# Patient Record
Sex: FEMALE | Race: WHITE | NOT HISPANIC OR LATINO | Employment: UNEMPLOYED | URBAN - NONMETROPOLITAN AREA
[De-identification: names, ages, dates, MRNs, and addresses within clinical notes are randomized per-mention and may not be internally consistent; named-entity substitution may affect disease eponyms.]

---

## 2019-10-17 ENCOUNTER — OFFICE VISIT (OUTPATIENT)
Dept: GASTROENTEROLOGY | Facility: CLINIC | Age: 29
End: 2019-10-17

## 2019-10-17 VITALS
HEIGHT: 64 IN | DIASTOLIC BLOOD PRESSURE: 82 MMHG | HEART RATE: 96 BPM | WEIGHT: 164 LBS | SYSTOLIC BLOOD PRESSURE: 132 MMHG | OXYGEN SATURATION: 99 % | BODY MASS INDEX: 28 KG/M2

## 2019-10-17 DIAGNOSIS — R79.89 ELEVATED LIVER FUNCTION TESTS: ICD-10-CM

## 2019-10-17 DIAGNOSIS — Z71.6 TOBACCO ABUSE COUNSELING: ICD-10-CM

## 2019-10-17 DIAGNOSIS — B18.2 CHRONIC HEPATITIS C WITHOUT HEPATIC COMA (HCC): Primary | ICD-10-CM

## 2019-10-17 DIAGNOSIS — E66.9 OBESITY, UNSPECIFIED OBESITY SEVERITY, UNSPECIFIED OBESITY TYPE: ICD-10-CM

## 2019-10-17 PROCEDURE — 99204 OFFICE O/P NEW MOD 45 MIN: CPT | Performed by: CLINICAL NURSE SPECIALIST

## 2019-10-17 RX ORDER — OMEPRAZOLE 20 MG/1
CAPSULE, DELAYED RELEASE ORAL DAILY
COMMUNITY
Start: 2019-10-15

## 2019-10-17 RX ORDER — GABAPENTIN 300 MG/1
CAPSULE ORAL 3 TIMES DAILY
COMMUNITY
Start: 2019-10-15

## 2019-10-17 RX ORDER — VENLAFAXINE HYDROCHLORIDE 75 MG/1
CAPSULE, EXTENDED RELEASE ORAL DAILY
COMMUNITY
Start: 2019-10-14

## 2019-10-17 NOTE — PROGRESS NOTES
Jaqui Kate  1990    10/17/2019  Chief Complaint   Patient presents with   • GI Problem     New patient ref by Vanesa Guevara NP for Hepatitis C     Subjective   HPI  Jaqui Kate is a 29 y.o. female who presents with a complaint of hepatitis C diagnosed 2 1/2 years ago. She says that since that time she was incarcerated and hadnt been treated. She went to a clinic in [place], [place] and told them she wanted to seek treatment for this.   She has a hx of IV drug use last time 3 years ago which is likely source. Chronicity is unknown. 2019 AST 40, ALT 82 T bili 0.4 ALKP 91. Platelet count 346 H/H 12.7/37.2.   HIV negative  Genotype 1A    Immune to hepatitis A and B   No ETOH  No current IV drug use  No sexual partner at current time  She has never been treated for hepatitis C    Past Medical History:   Diagnosis Date   • Anxiety    • Depressed    • GERD (gastroesophageal reflux disease)    • Hepatitis C      Past Surgical History:   Procedure Laterality Date   •  SECTION      x 2   • TONSILLECTOMY         Outpatient Medications Marked as Taking for the 10/17/19 encounter (Office Visit) with Pallavi Cee APRN   Medication Sig Dispense Refill   • gabapentin (NEURONTIN) 300 MG capsule 3 (Three) Times a Day.     • omeprazole (priLOSEC) 20 MG capsule Daily.     • venlafaxine XR (EFFEXOR-XR) 75 MG 24 hr capsule Daily.       No Known Allergies  Social History     Socioeconomic History   • Marital status: Single     Spouse name: Not on file   • Number of children: Not on file   • Years of education: Not on file   • Highest education level: Not on file   Tobacco Use   • Smoking status: Current Every Day Smoker     Packs/day: 0.25     Types: Cigarettes   • Smokeless tobacco: Never Used   Substance and Sexual Activity   • Alcohol use: No     Frequency: Never     Family History   Problem Relation Age of Onset   • Colon cancer Neg Hx    • Colon polyps Neg Hx      Health Maintenance   Topic Date Due   •  "ANNUAL PHYSICAL  09/12/1992   • TDAP/TD VACCINES (1 - Tdap) 09/12/2008   • INFLUENZA VACCINE  08/01/2019   • PAP SMEAR  10/15/2019     Review of Systems   Constitutional: Negative for activity change, appetite change, chills, diaphoresis, fatigue, fever and unexpected weight change.   HENT: Negative for ear pain, hearing loss, mouth sores, sore throat, trouble swallowing and voice change.    Eyes: Negative.    Respiratory: Negative for cough, choking, shortness of breath and wheezing.    Cardiovascular: Negative for chest pain and palpitations.   Gastrointestinal: Negative for abdominal pain, blood in stool, constipation, diarrhea, nausea and vomiting.   Endocrine: Negative for cold intolerance and heat intolerance.   Genitourinary: Negative for decreased urine volume, dysuria, frequency, hematuria and urgency.   Musculoskeletal: Negative for back pain, gait problem and myalgias.   Skin: Negative for color change, pallor and rash.   Allergic/Immunologic: Negative for food allergies and immunocompromised state.   Neurological: Negative for dizziness, tremors, seizures, syncope, weakness, light-headedness, numbness and headaches.   Hematological: Negative for adenopathy. Does not bruise/bleed easily.   Psychiatric/Behavioral: Negative for agitation and confusion. The patient is not nervous/anxious.    All other systems reviewed and are negative.    Objective   Vitals:    10/17/19 1008   BP: 132/82   Pulse: 96   SpO2: 99%   Weight: 74.4 kg (164 lb)   Height: 162.6 cm (64\")     Body mass index is 28.15 kg/m².  Physical Exam   Constitutional: She is oriented to person, place, and time. She appears well-developed and well-nourished.   HENT:   Head: Normocephalic and atraumatic.   Eyes: Pupils are equal, round, and reactive to light.   Neck: Normal range of motion. Neck supple. No tracheal deviation present.   Cardiovascular: Normal rate, regular rhythm and normal heart sounds. Exam reveals no gallop and no friction rub. "   No murmur heard.  Pulmonary/Chest: Effort normal and breath sounds normal. No respiratory distress. She has no wheezes. She has no rales. She exhibits no tenderness.   Abdominal: Soft. Bowel sounds are normal. She exhibits no distension. There is no hepatosplenomegaly. There is no tenderness. There is no rigidity, no rebound and no guarding.   Musculoskeletal: Normal range of motion. She exhibits no edema, tenderness or deformity.   Neurological: She is alert and oriented to person, place, and time. She has normal reflexes.   Skin: Skin is warm and dry. No rash noted. No pallor.   Psychiatric: She has a normal mood and affect. Her behavior is normal. Judgment and thought content normal.     Assessment/Plan   Jaqui was seen today for gi problem.    Diagnoses and all orders for this visit:    Chronic hepatitis C without hepatic coma (CMS/HCC)  -     US Liver; Future  -     Mitochondrial Antibodies, M2; Future  -     Ferritin  -     Anti-Smooth Muscle Antibody Titer  -     Alpha - 1 - Antitrypsin Phenotype  -     Ceruloplasmin  -     Hepatitis C RNA, Quantitative, PCR (graph)  -     Drug Screen (10), Serum  -     HCV NS5A Drug Resistance Assay  -     US Elastography Paranchyma; Future  -     Anti-microsomal Antibody  -     RICARDO  -     Ethanol  -     Hepatitis Panel, Acute    Elevated liver function tests    Tobacco abuse counseling    Obesity, unspecified obesity severity, unspecified obesity type    Other orders  -     Cancel: Hepatitis C Antibody  -     Cancel: Hepatitis B DNA, Quantitative, PCR    We have discussed the importance of follow up and keep appointments  Will determine treatment based on what her labs reveal and pending approval from her insurance carrier. She is aware that they determine this and I will decide what drug is best for the treatment of her type of hepatitis C. When/if approved she is to complete course as prescribed not missing a dose.   Encouraged no further IV drug use  She currently  has no sexual partner I have advised to safe sexual practices.   She verbalizes understanding and agrees.     EMR Dragon/transcription disclaimer: Much of this encounter note is electronic transcription/translation of spoken language to printed text. The electronic translation of spoken language may be erroneous, or at times, nonsensical words or phrases may be inadvertently transcribed. Although I have reviewed the note for such errors, some may still exist.  Body mass index is 28.15 kg/m².  Return in about 4 weeks (around 11/14/2019).    Patient's Body mass index is 28.15 kg/m². BMI is above normal parameters. Recommendations include: nutrition counseling.      All risks, benefits, alternatives, and indications of colonoscopy and/or Endoscopy procedure have been discussed with the patient. Risks to include perforation of the colon requiring possible surgery or colostomy, risk of bleeding from biopsies or removal of colon tissue, possibility of missing a colon polyp or cancer, or adverse drug reaction.  Benefits to include the diagnosis and management of disease of the colon and rectum. Alternatives to include barium enema, radiographic evaluation, lab testing or no intervention. Pt verbalizes understanding and agrees.     Pallavi Cee, APRN  10/17/2019  3:44 PM      Obesity, Adult  Obesity is the condition of having too much total body fat. Being overweight or obese means that your weight is greater than what is considered healthy for your body size. Obesity is determined by a measurement called BMI. BMI is an estimate of body fat and is calculated from height and weight. For adults, a BMI of 30 or higher is considered obese.  Obesity can eventually lead to other health concerns and major illnesses, including:  · Stroke.  · Coronary artery disease (CAD).  · Type 2 diabetes.  · Some types of cancer, including cancers of the colon, breast, uterus, and gallbladder.  · Osteoarthritis.  · High blood pressure  (hypertension).  · High cholesterol.  · Sleep apnea.  · Gallbladder stones.  · Infertility problems.  What are the causes?  The main cause of obesity is taking in (consuming) more calories than your body uses for energy. Other factors that contribute to this condition may include:  · Being born with genes that make you more likely to become obese.  · Having a medical condition that causes obesity. These conditions include:  ¨ Hypothyroidism.  ¨ Polycystic ovarian syndrome (PCOS).  ¨ Binge-eating disorder.  ¨ Cushing syndrome.  · Taking certain medicines, such as steroids, antidepressants, and seizure medicines.  · Not being physically active (sedentary lifestyle).  · Living where there are limited places to exercise safely or buy healthy foods.  · Not getting enough sleep.  What increases the risk?  The following factors may increase your risk of this condition:  · Having a family history of obesity.  · Being a woman of -American descent.  · Being a man of  descent.  What are the signs or symptoms?  Having excessive body fat is the main symptom of this condition.  How is this diagnosed?  This condition may be diagnosed based on:  · Your symptoms.  · Your medical history.  · A physical exam. Your health care provider may measure:  ¨ Your BMI. If you are an adult with a BMI between 25 and less than 30, you are considered overweight. If you are an adult with a BMI of 30 or higher, you are considered obese.  ¨ The distances around your hips and your waist (circumferences). These may be compared to each other to help diagnose your condition.  ¨ Your skinfold thickness. Your health care provider may gently pinch a fold of your skin and measure it.  How is this treated?  Treatment for this condition often includes changing your lifestyle. Treatment may include some or all of the following:  · Dietary changes. Work with your health care provider and a dietitian to set a weight-loss goal that is healthy and  reasonable for you. Dietary changes may include eating:  ¨ Smaller portions. A portion size is the amount of a particular food that is healthy for you to eat at one time. This varies from person to person.  ¨ Low-calorie or low-fat options.  ¨ More whole grains, fruits, and vegetables.  · Regular physical activity. This may include aerobic activity (cardio) and strength training.  · Medicine to help you lose weight. Your health care provider may prescribe medicine if you are unable to lose 1 pound a week after 6 weeks of eating more healthily and doing more physical activity.  · Surgery. Surgical options may include gastric banding and gastric bypass. Surgery may be done if:  ¨ Other treatments have not helped to improve your condition.  ¨ You have a BMI of 40 or higher.  ¨ You have life-threatening health problems related to obesity.  Follow these instructions at home:     Eating and drinking     · Follow recommendations from your health care provider about what you eat and drink. Your health care provider may advise you to:  ¨ Limit fast foods, sweets, and processed snack foods.  ¨ Choose low-fat options, such as low-fat milk instead of whole milk.  ¨ Eat 5 or more servings of fruits or vegetables every day.  ¨ Eat at home more often. This gives you more control over what you eat.  ¨ Choose healthy foods when you eat out.  ¨ Learn what a healthy portion size is.  ¨ Keep low-fat snacks on hand.  ¨ Avoid sugary drinks, such as soda, fruit juice, iced tea sweetened with sugar, and flavored milk.  ¨ Eat a healthy breakfast.  · Drink enough water to keep your urine clear or pale yellow.  · Do not go without eating for long periods of time (do not fast) or follow a fad diet. Fasting and fad diets can be unhealthy and even dangerous.  Physical Activity   · Exercise regularly, as told by your health care provider. Ask your health care provider what types of exercise are safe for you and how often you should  exercise.  · Warm up and stretch before being active.  · Cool down and stretch after being active.  · Rest between periods of activity.  Lifestyle   · Limit the time that you spend in front of your TV, computer, or video game system.  · Find ways to reward yourself that do not involve food.  · Limit alcohol intake to no more than 1 drink a day for nonpregnant women and 2 drinks a day for men. One drink equals 12 oz of beer, 5 oz of wine, or 1½ oz of hard liquor.  General instructions   · Keep a weight loss journal to keep track of the food you eat and how much you exercise you get.  · Take over-the-counter and prescription medicines only as told by your health care provider.  · Take vitamins and supplements only as told by your health care provider.  · Consider joining a support group. Your health care provider may be able to recommend a support group.  · Keep all follow-up visits as told by your health care provider. This is important.  Contact a health care provider if:  · You are unable to meet your weight loss goal after 6 weeks of dietary and lifestyle changes.  This information is not intended to replace advice given to you by your health care provider. Make sure you discuss any questions you have with your health care provider.  Document Released: 01/25/2006 Document Revised: 05/22/2017 Document Reviewed: 10/05/2016  [employer] Interactive Patient Education © 2017 [employer]      If you smoke or use tobacco, 4 minutes reading provided  Steps to Quit Smoking  Smoking tobacco can be harmful to your health and can affect almost every organ in your body. Smoking puts you, and those around you, at risk for developing many serious chronic diseases. Quitting smoking is difficult, but it is one of the best things that you can do for your health. It is never too late to quit.  What are the benefits of quitting smoking?  When you quit smoking, you lower your risk of developing serious diseases and conditions, such  as:  · Lung cancer or lung disease, such as COPD.  · Heart disease.  · Stroke.  · Heart attack.  · Infertility.  · Osteoporosis and bone fractures.  Additionally, symptoms such as coughing, wheezing, and shortness of breath may get better when you quit. You may also find that you get sick less often because your body is stronger at fighting off colds and infections. If you are pregnant, quitting smoking can help to reduce your chances of having a baby of low birth weight.  How do I get ready to quit?  When you decide to quit smoking, create a plan to make sure that you are successful. Before you quit:  · Pick a date to quit. Set a date within the next two weeks to give you time to prepare.  · Write down the reasons why you are quitting. Keep this list in places where you will see it often, such as on your bathroom mirror or in your car or wallet.  · Identify the people, places, things, and activities that make you want to smoke (triggers) and avoid them. Make sure to take these actions:  ¨ Throw away all cigarettes at home, at work, and in your car.  ¨ Throw away smoking accessories, such as ashtrays and lighters.  ¨ Clean your car and make sure to empty the ashtray.  ¨ Clean your home, including curtains and carpets.  · Tell your family, friends, and coworkers that you are quitting. Support from your loved ones can make quitting easier.  · Talk with your health care provider about your options for quitting smoking.  · Find out what treatment options are covered by your health insurance.  What strategies can I use to quit smoking?  Talk with your healthcare provider about different strategies to quit smoking. Some strategies include:  · Quitting smoking altogether instead of gradually lessening how much you smoke over a period of time. Research shows that quitting “cold turkey” is more successful than gradually quitting.  · Attending in-person counseling to help you build problem-solving skills. You are more likely  to have success in quitting if you attend several counseling sessions. Even short sessions of 10 minutes can be effective.  · Finding resources and support systems that can help you to quit smoking and remain smoke-free after you quit. These resources are most helpful when you use them often. They can include:  ¨ Online chats with a counselor.  ¨ Telephone quitlines.  ¨ Printed self-help materials.  ¨ Support groups or group counseling.  ¨ Text messaging programs.  ¨ Mobile phone applications.  · Taking medicines to help you quit smoking. (If you are pregnant or breastfeeding, talk with your health care provider first.) Some medicines contain nicotine and some do not. Both types of medicines help with cravings, but the medicines that include nicotine help to relieve withdrawal symptoms. Your health care provider may recommend:  ¨ Nicotine patches, gum, or lozenges.  ¨ Nicotine inhalers or sprays.  ¨ Non-nicotine medicine that is taken by mouth.  Talk with your health care provider about combining strategies, such as taking medicines while you are also receiving in-person counseling. Using these two strategies together makes you more likely to succeed in quitting than if you used either strategy on its own.  If you are pregnant or breastfeeding, talk with your health care provider about finding counseling or other support strategies to quit smoking. Do not take medicine to help you quit smoking unless told to do so by your health care provider.  What things can I do to make it easier to quit?  Quitting smoking might feel overwhelming at first, but there is a lot that you can do to make it easier. Take these important actions:  · Reach out to your family and friends and ask that they support and encourage you during this time. Call telephone quitlines, reach out to support groups, or work with a counselor for support.  · Ask people who smoke to avoid smoking around you.  · Avoid places that trigger you to smoke, such  as bars, parties, or smoke-break areas at work.  · Spend time around people who do not smoke.  · Lessen stress in your life, because stress can be a smoking trigger for some people. To lessen stress, try:  ¨ Exercising regularly.  ¨ Deep-breathing exercises.  ¨ Yoga.  ¨ Meditating.  ¨ Performing a body scan. This involves closing your eyes, scanning your body from head to toe, and noticing which parts of your body are particularly tense. Purposefully relax the muscles in those areas.  · Download or purchase mobile phone or tablet apps (applications) that can help you stick to your quit plan by providing reminders, tips, and encouragement. There are many free apps, such as QuitGuide from the CDC (Centers for Disease Control and Prevention). You can find other support for quitting smoking (smoking cessation) through smokefree.gov and other websites.  How will I feel when I quit smoking?  Within the first 24 hours of quitting smoking, you may start to feel some withdrawal symptoms. These symptoms are usually most noticeable 2-3 days after quitting, but they usually do not last beyond 2-3 weeks. Changes or symptoms that you might experience include:  · Mood swings.  · Restlessness, anxiety, or irritation.  · Difficulty concentrating.  · Dizziness.  · Strong cravings for sugary foods in addition to nicotine.  · Mild weight gain.  · Constipation.  · Nausea.  · Coughing or a sore throat.  · Changes in how your medicines work in your body.  · A depressed mood.  · Difficulty sleeping (insomnia).  After the first 2-3 weeks of quitting, you may start to notice more positive results, such as:  · Improved sense of smell and taste.  · Decreased coughing and sore throat.  · Slower heart rate.  · Lower blood pressure.  · Clearer skin.  · The ability to breathe more easily.  · Fewer sick days.  Quitting smoking is very challenging for most people. Do not get discouraged if you are not successful the first time. Some people need to  make many attempts to quit before they achieve long-term success. Do your best to stick to your quit plan, and talk with your health care provider if you have any questions or concerns.  This information is not intended to replace advice given to you by your health care provider. Make sure you discuss any questions you have with your health care provider.  Document Released: 12/12/2002 Document Revised: 08/15/2017 Document Reviewed: 05/03/2016  [employer] Interactive Patient Education © 2017 [employer]

## 2019-10-28 ENCOUNTER — HOSPITAL ENCOUNTER (OUTPATIENT)
Dept: ULTRASOUND IMAGING | Facility: HOSPITAL | Age: 29
Discharge: HOME | End: 2019-10-28
Admitting: CLINICAL NURSE SPECIALIST

## 2019-10-28 ENCOUNTER — HOSPITAL ENCOUNTER (OUTPATIENT)
Dept: ULTRASOUND IMAGING | Facility: HOSPITAL | Age: 29
Discharge: HOME | End: 2019-10-28

## 2019-10-28 DIAGNOSIS — B18.2 CHRONIC HEPATITIS C WITHOUT HEPATIC COMA (HCC): ICD-10-CM

## 2019-10-28 PROCEDURE — 76705 ECHO EXAM OF ABDOMEN: CPT

## 2019-10-28 PROCEDURE — 76981 USE PARENCHYMA: CPT

## 2019-11-22 ENCOUNTER — TELEPHONE (OUTPATIENT)
Dept: GASTROENTEROLOGY | Facility: CLINIC | Age: 29
End: 2019-11-22

## 2019-11-22 NOTE — TELEPHONE ENCOUNTER
I have tried to call patient multiple times and have left multiple messages about her labs that she hasn't gotten drawn that Pallavi ordered back in October for Hepatitis C treatment and I have also mailed her a letter patient hasn't responded we will no longer treat patient for Hepatitis C per her non compliance.

## 2024-01-30 ENCOUNTER — HOSPITAL ENCOUNTER (INPATIENT)
Facility: HOSPITAL | Age: 34
LOS: 5 days | Discharge: LEFT BEFORE DISCHARGE | DRG: 871 | End: 2024-02-04
Attending: FAMILY MEDICINE | Admitting: FAMILY MEDICINE
Payer: COMMERCIAL

## 2024-01-30 PROBLEM — A41.9 SEPSIS: Status: ACTIVE | Noted: 2024-01-30

## 2024-01-30 LAB
ARTERIAL PATENCY WRIST A: POSITIVE
ATMOSPHERIC PRESS: 753 MMHG
BASE EXCESS BLDA CALC-SCNC: -4.1 MMOL/L (ref 0–2)
BDY SITE: ABNORMAL
BODY TEMPERATURE: 37
HCO3 BLDA-SCNC: 17.4 MMOL/L (ref 20–26)
Lab: ABNORMAL
MODALITY: ABNORMAL
PCO2 BLDA: 21 MM HG (ref 35–45)
PCO2 TEMP ADJ BLD: 21 MM HG (ref 35–45)
PH BLDA: 7.53 PH UNITS (ref 7.35–7.45)
PH, TEMP CORRECTED: 7.53 PH UNITS (ref 7.35–7.45)
PO2 BLDA: 82.5 MM HG (ref 83–108)
PO2 TEMP ADJ BLD: 82.5 MM HG (ref 83–108)
SAO2 % BLDCOA: 97.3 % (ref 94–99)
VENTILATOR MODE: ABNORMAL

## 2024-01-30 PROCEDURE — 87147 CULTURE TYPE IMMUNOLOGIC: CPT | Performed by: INTERNAL MEDICINE

## 2024-01-30 PROCEDURE — 85007 BL SMEAR W/DIFF WBC COUNT: CPT | Performed by: INTERNAL MEDICINE

## 2024-01-30 PROCEDURE — 87186 SC STD MICRODIL/AGAR DIL: CPT | Performed by: INTERNAL MEDICINE

## 2024-01-30 PROCEDURE — 87154 CUL TYP ID BLD PTHGN 6+ TRGT: CPT | Performed by: INTERNAL MEDICINE

## 2024-01-30 PROCEDURE — 83605 ASSAY OF LACTIC ACID: CPT | Performed by: INTERNAL MEDICINE

## 2024-01-30 PROCEDURE — 83735 ASSAY OF MAGNESIUM: CPT | Performed by: INTERNAL MEDICINE

## 2024-01-30 PROCEDURE — 87040 BLOOD CULTURE FOR BACTERIA: CPT | Performed by: INTERNAL MEDICINE

## 2024-01-30 PROCEDURE — 82803 BLOOD GASES ANY COMBINATION: CPT

## 2024-01-30 PROCEDURE — 85025 COMPLETE CBC W/AUTO DIFF WBC: CPT | Performed by: INTERNAL MEDICINE

## 2024-01-30 PROCEDURE — 82550 ASSAY OF CK (CPK): CPT | Performed by: INTERNAL MEDICINE

## 2024-01-30 PROCEDURE — 36600 WITHDRAWAL OF ARTERIAL BLOOD: CPT

## 2024-01-30 PROCEDURE — 80053 COMPREHEN METABOLIC PANEL: CPT | Performed by: INTERNAL MEDICINE

## 2024-01-30 PROCEDURE — 85362 FIBRIN DEGRADATION PRODUCTS: CPT | Performed by: INTERNAL MEDICINE

## 2024-01-30 RX ORDER — PANTOPRAZOLE SODIUM 40 MG/1
40 TABLET, DELAYED RELEASE ORAL
Status: DISCONTINUED | OUTPATIENT
Start: 2024-01-31 | End: 2024-01-31

## 2024-01-30 RX ORDER — CHLORHEXIDINE GLUCONATE 500 MG/1
1 CLOTH TOPICAL ONCE
Status: COMPLETED | OUTPATIENT
Start: 2024-01-30 | End: 2024-01-31

## 2024-01-30 RX ORDER — CHLORHEXIDINE GLUCONATE 500 MG/1
1 CLOTH TOPICAL EVERY 24 HOURS
Status: DISCONTINUED | OUTPATIENT
Start: 2024-01-31 | End: 2024-02-04 | Stop reason: HOSPADM

## 2024-01-30 RX ORDER — DIPHENHYDRAMINE HYDROCHLORIDE 50 MG/ML
25 INJECTION INTRAMUSCULAR; INTRAVENOUS EVERY 6 HOURS PRN
Status: DISCONTINUED | OUTPATIENT
Start: 2024-01-30 | End: 2024-02-04 | Stop reason: HOSPADM

## 2024-01-30 RX ORDER — ACETAMINOPHEN 325 MG/1
650 TABLET ORAL EVERY 4 HOURS PRN
Status: DISCONTINUED | OUTPATIENT
Start: 2024-01-30 | End: 2024-02-04 | Stop reason: HOSPADM

## 2024-01-30 RX ORDER — ONDANSETRON 2 MG/ML
4 INJECTION INTRAMUSCULAR; INTRAVENOUS EVERY 6 HOURS PRN
Status: DISCONTINUED | OUTPATIENT
Start: 2024-01-30 | End: 2024-02-04 | Stop reason: HOSPADM

## 2024-01-30 RX ORDER — VENLAFAXINE HYDROCHLORIDE 75 MG/1
75 CAPSULE, EXTENDED RELEASE ORAL DAILY
Status: DISCONTINUED | OUTPATIENT
Start: 2024-01-31 | End: 2024-01-31

## 2024-01-30 RX ORDER — GABAPENTIN 100 MG/1
100 CAPSULE ORAL 3 TIMES DAILY
Status: DISCONTINUED | OUTPATIENT
Start: 2024-01-30 | End: 2024-01-31

## 2024-01-30 RX ORDER — ACETAMINOPHEN 650 MG/1
325 SUPPOSITORY RECTAL EVERY 4 HOURS PRN
Status: DISCONTINUED | OUTPATIENT
Start: 2024-01-30 | End: 2024-02-04 | Stop reason: HOSPADM

## 2024-01-30 RX ORDER — VANCOMYCIN/0.9 % SOD CHLORIDE 1.5G/250ML
1500 PLASTIC BAG, INJECTION (ML) INTRAVENOUS ONCE
Qty: 500 ML | Refills: 0 | Status: COMPLETED | OUTPATIENT
Start: 2024-01-31 | End: 2024-01-31

## 2024-01-30 RX ORDER — SODIUM CHLORIDE 0.9 % (FLUSH) 0.9 %
10 SYRINGE (ML) INJECTION AS NEEDED
Status: DISCONTINUED | OUTPATIENT
Start: 2024-01-30 | End: 2024-02-04 | Stop reason: HOSPADM

## 2024-01-30 RX ORDER — SODIUM CHLORIDE 0.9 % (FLUSH) 0.9 %
10 SYRINGE (ML) INJECTION EVERY 12 HOURS SCHEDULED
Status: DISCONTINUED | OUTPATIENT
Start: 2024-01-30 | End: 2024-02-04 | Stop reason: HOSPADM

## 2024-01-30 RX ORDER — NITROGLYCERIN 0.4 MG/1
0.4 TABLET SUBLINGUAL
Status: DISCONTINUED | OUTPATIENT
Start: 2024-01-30 | End: 2024-02-04 | Stop reason: HOSPADM

## 2024-01-30 RX ORDER — SODIUM CHLORIDE 9 MG/ML
50 INJECTION, SOLUTION INTRAVENOUS CONTINUOUS
Status: DISCONTINUED | OUTPATIENT
Start: 2024-01-30 | End: 2024-02-04 | Stop reason: HOSPADM

## 2024-01-30 RX ORDER — ALBUMIN (HUMAN) 12.5 G/50ML
12.5 SOLUTION INTRAVENOUS ONCE
Status: COMPLETED | OUTPATIENT
Start: 2024-01-30 | End: 2024-01-31

## 2024-01-30 RX ORDER — SODIUM CHLORIDE 9 MG/ML
40 INJECTION, SOLUTION INTRAVENOUS AS NEEDED
Status: DISCONTINUED | OUTPATIENT
Start: 2024-01-30 | End: 2024-02-04 | Stop reason: HOSPADM

## 2024-01-31 ENCOUNTER — TELEPHONE (OUTPATIENT)
Age: 34
End: 2024-01-31
Payer: COMMERCIAL

## 2024-01-31 ENCOUNTER — APPOINTMENT (OUTPATIENT)
Dept: GENERAL RADIOLOGY | Facility: HOSPITAL | Age: 34
DRG: 871 | End: 2024-01-31
Payer: COMMERCIAL

## 2024-01-31 ENCOUNTER — APPOINTMENT (OUTPATIENT)
Dept: CARDIOLOGY | Facility: HOSPITAL | Age: 34
DRG: 871 | End: 2024-01-31
Payer: COMMERCIAL

## 2024-01-31 PROBLEM — I95.9 SEPSIS ASSOCIATED HYPOTENSION: Status: ACTIVE | Noted: 2024-01-31

## 2024-01-31 PROBLEM — F15.10 METHAMPHETAMINE USE: Status: ACTIVE | Noted: 2024-01-31

## 2024-01-31 PROBLEM — A41.9 SEPSIS ASSOCIATED HYPOTENSION: Status: ACTIVE | Noted: 2024-01-31

## 2024-01-31 PROBLEM — I33.0 TRICUSPID VALVE VEGETATION: Status: ACTIVE | Noted: 2024-01-31

## 2024-01-31 PROBLEM — D64.9 ANEMIA: Status: ACTIVE | Noted: 2024-01-31

## 2024-01-31 PROBLEM — N17.9 ACUTE RENAL INJURY DUE TO SEPSIS: Status: ACTIVE | Noted: 2024-01-30

## 2024-01-31 PROBLEM — D69.6 THROMBOCYTOPENIA: Status: ACTIVE | Noted: 2024-01-31

## 2024-01-31 PROBLEM — I33.0 BACTERIAL ENDOCARDITIS: Status: ACTIVE | Noted: 2024-01-31

## 2024-01-31 PROBLEM — N17.9 AKI (ACUTE KIDNEY INJURY): Status: ACTIVE | Noted: 2024-01-31

## 2024-01-31 PROBLEM — R65.20 ACUTE RENAL INJURY DUE TO SEPSIS: Status: ACTIVE | Noted: 2024-01-30

## 2024-01-31 PROBLEM — E87.1 HYPONATREMIA: Status: ACTIVE | Noted: 2024-01-31

## 2024-01-31 LAB
ALBUMIN SERPL-MCNC: 2.1 G/DL (ref 3.5–5.2)
ALBUMIN SERPL-MCNC: 2.2 G/DL (ref 3.5–5.2)
ALBUMIN/GLOB SERPL: 0.7 G/DL
ALBUMIN/GLOB SERPL: 0.8 G/DL
ALP SERPL-CCNC: 143 U/L (ref 39–117)
ALP SERPL-CCNC: 163 U/L (ref 39–117)
ALT SERPL W P-5'-P-CCNC: 27 U/L (ref 1–33)
ALT SERPL W P-5'-P-CCNC: 32 U/L (ref 1–33)
ANION GAP SERPL CALCULATED.3IONS-SCNC: 17 MMOL/L (ref 5–15)
ANION GAP SERPL CALCULATED.3IONS-SCNC: 19 MMOL/L (ref 5–15)
ANISOCYTOSIS BLD QL: ABNORMAL
APTT PPP: 42.9 SECONDS (ref 24.5–36)
AST SERPL-CCNC: 23 U/L (ref 1–32)
AST SERPL-CCNC: 26 U/L (ref 1–32)
B-HCG UR QL: NEGATIVE
BACTERIA BLD CULT: ABNORMAL
BH CV ECHO LEFT VENTRICLE GLOBAL LONGITUDINAL STRAIN: -14.9 %
BH CV ECHO MEAS - AO MAX PG: 15.1 MMHG
BH CV ECHO MEAS - AO MEAN PG: 6.2 MMHG
BH CV ECHO MEAS - AO ROOT DIAM: 2.6 CM
BH CV ECHO MEAS - AO V2 MAX: 194 CM/SEC
BH CV ECHO MEAS - AO V2 VTI: 19 CM
BH CV ECHO MEAS - AVA(I,D): 2.7 CM2
BH CV ECHO MEAS - EDV(CUBED): 85.2 ML
BH CV ECHO MEAS - EDV(MOD-SP4): 82.9 ML
BH CV ECHO MEAS - EF(MOD-SP4): 72.1 %
BH CV ECHO MEAS - ESV(CUBED): 35.9 ML
BH CV ECHO MEAS - ESV(MOD-SP4): 23.1 ML
BH CV ECHO MEAS - FS: 25 %
BH CV ECHO MEAS - IVS/LVPW: 0.69 CM
BH CV ECHO MEAS - IVSD: 0.9 CM
BH CV ECHO MEAS - LA DIMENSION: 3.5 CM
BH CV ECHO MEAS - LAT PEAK E' VEL: 23.1 CM/SEC
BH CV ECHO MEAS - LV DIASTOLIC VOL/BSA (35-75): 45.8 CM2
BH CV ECHO MEAS - LV MASS(C)D: 168.9 GRAMS
BH CV ECHO MEAS - LV MAX PG: 5.5 MMHG
BH CV ECHO MEAS - LV MEAN PG: 3 MMHG
BH CV ECHO MEAS - LV SYSTOLIC VOL/BSA (12-30): 12.8 CM2
BH CV ECHO MEAS - LV V1 MAX: 117.4 CM/SEC
BH CV ECHO MEAS - LV V1 VTI: 13.7 CM
BH CV ECHO MEAS - LVIDD: 4.4 CM
BH CV ECHO MEAS - LVIDS: 3.3 CM
BH CV ECHO MEAS - LVOT AREA: 3.8 CM2
BH CV ECHO MEAS - LVOT DIAM: 2.2 CM
BH CV ECHO MEAS - LVPWD: 1.3 CM
BH CV ECHO MEAS - MED PEAK E' VEL: 16.6 CM/SEC
BH CV ECHO MEAS - MV A MAX VEL: 63.4 CM/SEC
BH CV ECHO MEAS - MV DEC TIME: 0.08 SEC
BH CV ECHO MEAS - MV E MAX VEL: 98.5 CM/SEC
BH CV ECHO MEAS - MV E/A: 1.55
BH CV ECHO MEAS - PA V2 MAX: 118.7 CM/SEC
BH CV ECHO MEAS - RAP SYSTOLE: 8 MMHG
BH CV ECHO MEAS - RVSP: 32.6 MMHG
BH CV ECHO MEAS - SI(MOD-SP4): 33.1 ML/M2
BH CV ECHO MEAS - SV(LVOT): 52.1 ML
BH CV ECHO MEAS - SV(MOD-SP4): 59.8 ML
BH CV ECHO MEAS - TAPSE (>1.6): 2 CM
BH CV ECHO MEAS - TR MAX PG: 24.6 MMHG
BH CV ECHO MEAS - TR MAX VEL: 248 CM/SEC
BH CV ECHO MEASUREMENTS AVERAGE E/E' RATIO: 4.96
BH CV XLRA - RV BASE: 2.7 CM
BH CV XLRA - RV LENGTH: 7.1 CM
BH CV XLRA - RV MID: 2.8 CM
BH CV XLRA - TDI S': 20.5 CM/SEC
BILIRUB SERPL-MCNC: 2.3 MG/DL (ref 0–1.2)
BILIRUB SERPL-MCNC: 2.5 MG/DL (ref 0–1.2)
BOTTLE TYPE: ABNORMAL
BUN SERPL-MCNC: 58 MG/DL (ref 6–20)
BUN SERPL-MCNC: 59 MG/DL (ref 6–20)
BUN/CREAT SERPL: 25.1 (ref 7–25)
BUN/CREAT SERPL: 31.2 (ref 7–25)
CALCIUM SPEC-SCNC: 7.1 MG/DL (ref 8.6–10.5)
CALCIUM SPEC-SCNC: 7.8 MG/DL (ref 8.6–10.5)
CHLORIDE SERPL-SCNC: 92 MMOL/L (ref 98–107)
CHLORIDE SERPL-SCNC: 97 MMOL/L (ref 98–107)
CK SERPL-CCNC: 76 U/L (ref 20–180)
CO2 SERPL-SCNC: 17 MMOL/L (ref 22–29)
CO2 SERPL-SCNC: 18 MMOL/L (ref 22–29)
CREAT SERPL-MCNC: 1.86 MG/DL (ref 0.57–1)
CREAT SERPL-MCNC: 2.35 MG/DL (ref 0.57–1)
D-LACTATE SERPL-SCNC: 2.3 MMOL/L (ref 0.5–2)
D-LACTATE SERPL-SCNC: 2.7 MMOL/L (ref 0.5–2)
D-LACTATE SERPL-SCNC: 2.9 MMOL/L (ref 0.5–2)
D-LACTATE SERPL-SCNC: 3 MMOL/L (ref 0.5–2)
D-LACTATE SERPL-SCNC: 4.2 MMOL/L (ref 0.5–2)
D-LACTATE SERPL-SCNC: 7.1 MMOL/L (ref 0.5–2)
DEPRECATED RDW RBC AUTO: 42.1 FL (ref 37–54)
DEPRECATED RDW RBC AUTO: 42.4 FL (ref 37–54)
EGFRCR SERPLBLD CKD-EPI 2021: 27.2 ML/MIN/1.73
EGFRCR SERPLBLD CKD-EPI 2021: 36.1 ML/MIN/1.73
ERYTHROCYTE [DISTWIDTH] IN BLOOD BY AUTOMATED COUNT: 14.4 % (ref 12.3–15.4)
ERYTHROCYTE [DISTWIDTH] IN BLOOD BY AUTOMATED COUNT: 14.6 % (ref 12.3–15.4)
FSP PPP LA-ACNC: ABNORMAL
GLOBULIN UR ELPH-MCNC: 2.7 GM/DL
GLOBULIN UR ELPH-MCNC: 3.2 GM/DL
GLUCOSE SERPL-MCNC: 73 MG/DL (ref 65–99)
GLUCOSE SERPL-MCNC: 97 MG/DL (ref 65–99)
HCT VFR BLD AUTO: 22.8 % (ref 34–46.6)
HCT VFR BLD AUTO: 27.5 % (ref 34–46.6)
HGB BLD-MCNC: 7.7 G/DL (ref 12–15.9)
HGB BLD-MCNC: 9.1 G/DL (ref 12–15.9)
INR PPP: 1.29 (ref 0.91–1.09)
LEFT ATRIUM VOLUME INDEX: 19.4 ML/M2
LYMPHOCYTES # BLD MANUAL: 0.54 10*3/MM3 (ref 0.7–3.1)
LYMPHOCYTES NFR BLD MANUAL: 2 % (ref 5–12)
MAGNESIUM SERPL-MCNC: 1.7 MG/DL (ref 1.6–2.6)
MCH RBC QN AUTO: 27.2 PG (ref 26.6–33)
MCH RBC QN AUTO: 27.3 PG (ref 26.6–33)
MCHC RBC AUTO-ENTMCNC: 33.1 G/DL (ref 31.5–35.7)
MCHC RBC AUTO-ENTMCNC: 33.8 G/DL (ref 31.5–35.7)
MCV RBC AUTO: 80.9 FL (ref 79–97)
MCV RBC AUTO: 82.3 FL (ref 79–97)
MONOCYTES # BLD: 0.54 10*3/MM3 (ref 0.1–0.9)
MRSA DNA SPEC QL NAA+PROBE: ABNORMAL
NEUTROPHILS # BLD AUTO: 26.01 10*3/MM3 (ref 1.7–7)
NEUTROPHILS NFR BLD MANUAL: 95 % (ref 42.7–76)
NEUTS BAND NFR BLD MANUAL: 1 % (ref 0–5)
NEUTS VAC BLD QL SMEAR: ABNORMAL
PLATELET # BLD AUTO: 41 10*3/MM3 (ref 140–450)
PLATELET # BLD AUTO: 46 10*3/MM3 (ref 140–450)
PMV BLD AUTO: 13.2 FL (ref 6–12)
PMV BLD AUTO: 14 FL (ref 6–12)
POIKILOCYTOSIS BLD QL SMEAR: ABNORMAL
POTASSIUM SERPL-SCNC: 3.3 MMOL/L (ref 3.5–5.2)
POTASSIUM SERPL-SCNC: 3.4 MMOL/L (ref 3.5–5.2)
PROT SERPL-MCNC: 4.9 G/DL (ref 6–8.5)
PROT SERPL-MCNC: 5.3 G/DL (ref 6–8.5)
PROTHROMBIN TIME: 16.2 SECONDS (ref 11.8–14.8)
RBC # BLD AUTO: 2.82 10*6/MM3 (ref 3.77–5.28)
RBC # BLD AUTO: 3.34 10*6/MM3 (ref 3.77–5.28)
SMALL PLATELETS BLD QL SMEAR: ABNORMAL
SODIUM SERPL-SCNC: 128 MMOL/L (ref 136–145)
SODIUM SERPL-SCNC: 132 MMOL/L (ref 136–145)
TOXIC GRANULATION: ABNORMAL
VARIANT LYMPHS NFR BLD MANUAL: 2 % (ref 19.6–45.3)
WBC NRBC COR # BLD AUTO: 21.12 10*3/MM3 (ref 3.4–10.8)
WBC NRBC COR # BLD AUTO: 27.09 10*3/MM3 (ref 3.4–10.8)

## 2024-01-31 PROCEDURE — 93356 MYOCRD STRAIN IMG SPCKL TRCK: CPT | Performed by: INTERNAL MEDICINE

## 2024-01-31 PROCEDURE — 85027 COMPLETE CBC AUTOMATED: CPT | Performed by: INTERNAL MEDICINE

## 2024-01-31 PROCEDURE — 80053 COMPREHEN METABOLIC PANEL: CPT | Performed by: INTERNAL MEDICINE

## 2024-01-31 PROCEDURE — 83605 ASSAY OF LACTIC ACID: CPT | Performed by: INTERNAL MEDICINE

## 2024-01-31 PROCEDURE — 81025 URINE PREGNANCY TEST: CPT | Performed by: FAMILY MEDICINE

## 2024-01-31 PROCEDURE — 25010000002 PIPERACILLIN SOD-TAZOBACTAM PER 1 G: Performed by: INTERNAL MEDICINE

## 2024-01-31 PROCEDURE — 25010000002 VANCOMYCIN 10 G RECONSTITUTED SOLUTION: Performed by: INTERNAL MEDICINE

## 2024-01-31 PROCEDURE — P9047 ALBUMIN (HUMAN), 25%, 50ML: HCPCS | Performed by: INTERNAL MEDICINE

## 2024-01-31 PROCEDURE — 93306 TTE W/DOPPLER COMPLETE: CPT | Performed by: INTERNAL MEDICINE

## 2024-01-31 PROCEDURE — 99222 1ST HOSP IP/OBS MODERATE 55: CPT | Performed by: INTERNAL MEDICINE

## 2024-01-31 PROCEDURE — 74018 RADEX ABDOMEN 1 VIEW: CPT

## 2024-01-31 PROCEDURE — 25810000003 SODIUM CHLORIDE 0.9 % SOLUTION: Performed by: INTERNAL MEDICINE

## 2024-01-31 PROCEDURE — 85610 PROTHROMBIN TIME: CPT | Performed by: INTERNAL MEDICINE

## 2024-01-31 PROCEDURE — 25010000002 VANCOMYCIN 10 G RECONSTITUTED SOLUTION: Performed by: FAMILY MEDICINE

## 2024-01-31 PROCEDURE — 87641 MR-STAPH DNA AMP PROBE: CPT | Performed by: INTERNAL MEDICINE

## 2024-01-31 PROCEDURE — 25510000001 PERFLUTREN 6.52 MG/ML SUSPENSION: Performed by: FAMILY MEDICINE

## 2024-01-31 PROCEDURE — 25810000003 SODIUM CHLORIDE 0.9 % SOLUTION: Performed by: FAMILY MEDICINE

## 2024-01-31 PROCEDURE — 93356 MYOCRD STRAIN IMG SPCKL TRCK: CPT

## 2024-01-31 PROCEDURE — 93306 TTE W/DOPPLER COMPLETE: CPT

## 2024-01-31 PROCEDURE — 99223 1ST HOSP IP/OBS HIGH 75: CPT | Performed by: INTERNAL MEDICINE

## 2024-01-31 PROCEDURE — 25010000002 ALBUMIN HUMAN 25% PER 50 ML: Performed by: INTERNAL MEDICINE

## 2024-01-31 PROCEDURE — 85730 THROMBOPLASTIN TIME PARTIAL: CPT | Performed by: INTERNAL MEDICINE

## 2024-01-31 PROCEDURE — 71045 X-RAY EXAM CHEST 1 VIEW: CPT

## 2024-01-31 RX ORDER — ACETAMINOPHEN 325 MG/1
975 TABLET ORAL ONCE
Status: COMPLETED | OUTPATIENT
Start: 2024-01-31 | End: 2024-01-31

## 2024-01-31 RX ORDER — NOREPINEPHRINE BITARTRATE 0.03 MG/ML
.02-.3 INJECTION, SOLUTION INTRAVENOUS
Status: DISCONTINUED | OUTPATIENT
Start: 2024-01-31 | End: 2024-02-03

## 2024-01-31 RX ADMIN — NOREPINEPHRINE BITARTRATE 0.02 MCG/KG/MIN: 0.03 INJECTION, SOLUTION INTRAVENOUS at 02:46

## 2024-01-31 RX ADMIN — SODIUM CHLORIDE 125 ML/HR: 9 INJECTION, SOLUTION INTRAVENOUS at 01:57

## 2024-01-31 RX ADMIN — ACETAMINOPHEN 975 MG: 325 TABLET, FILM COATED ORAL at 21:53

## 2024-01-31 RX ADMIN — Medication 1 APPLICATION: at 20:52

## 2024-01-31 RX ADMIN — Medication 1 APPLICATION: at 10:13

## 2024-01-31 RX ADMIN — VANCOMYCIN HYDROCHLORIDE 1250 MG: 10 INJECTION, POWDER, LYOPHILIZED, FOR SOLUTION INTRAVENOUS at 17:37

## 2024-01-31 RX ADMIN — PIPERACILLIN SODIUM AND TAZOBACTAM SODIUM 3.38 G: 3; .375 INJECTION, POWDER, LYOPHILIZED, FOR SOLUTION INTRAVENOUS at 01:56

## 2024-01-31 RX ADMIN — SODIUM CHLORIDE 125 ML/HR: 9 INJECTION, SOLUTION INTRAVENOUS at 17:36

## 2024-01-31 RX ADMIN — VANCOMYCIN HYDROCHLORIDE 1500 MG: 10 INJECTION, POWDER, LYOPHILIZED, FOR SOLUTION INTRAVENOUS at 01:52

## 2024-01-31 RX ADMIN — PERFLUTREN 6.52 MG: 6.52 INJECTION, SUSPENSION INTRAVENOUS at 11:54

## 2024-01-31 RX ADMIN — Medication 10 ML: at 20:52

## 2024-01-31 RX ADMIN — Medication 10 ML: at 10:14

## 2024-01-31 RX ADMIN — CHLORHEXIDINE GLUCONATE 1 APPLICATION: 500 CLOTH TOPICAL at 03:08

## 2024-01-31 RX ADMIN — CHLORHEXIDINE GLUCONATE 1 APPLICATION: 500 CLOTH TOPICAL at 00:38

## 2024-01-31 RX ADMIN — ACETAMINOPHEN 650 MG: 325 TABLET, FILM COATED ORAL at 11:24

## 2024-01-31 RX ADMIN — GABAPENTIN 100 MG: 100 CAPSULE ORAL at 01:10

## 2024-01-31 RX ADMIN — Medication 10 ML: at 01:52

## 2024-01-31 RX ADMIN — SODIUM CHLORIDE 1000 ML: 9 INJECTION, SOLUTION INTRAVENOUS at 01:51

## 2024-01-31 RX ADMIN — Medication 1 APPLICATION: at 01:02

## 2024-01-31 RX ADMIN — PANTOPRAZOLE SODIUM 40 MG: 40 TABLET, DELAYED RELEASE ORAL at 05:24

## 2024-01-31 RX ADMIN — ALBUMIN HUMAN 12.5 G: 0.25 SOLUTION INTRAVENOUS at 01:51

## 2024-01-31 RX ADMIN — PIPERACILLIN SODIUM AND TAZOBACTAM SODIUM 3.38 G: 3; .375 INJECTION, POWDER, LYOPHILIZED, FOR SOLUTION INTRAVENOUS at 05:25

## 2024-01-31 RX ADMIN — PIPERACILLIN SODIUM AND TAZOBACTAM SODIUM 3.38 G: 3; .375 INJECTION, POWDER, LYOPHILIZED, FOR SOLUTION INTRAVENOUS at 13:49

## 2024-01-31 RX ADMIN — SODIUM CHLORIDE 125 ML/HR: 9 INJECTION, SOLUTION INTRAVENOUS at 10:02

## 2024-01-31 NOTE — PAYOR COMM NOTE
"ADMIT 1/30/2024    [facility]  MARCO ANTONIO,  [phone]  OR  FAX   [phone]    Jaqui Farrell (33 y.o. Female)       Date of Birth   1990    Social Security Number       Address   [facility] [place] [address]    Home Phone   [phone]    MRN   [id number]       Jewish   Other    Marital Status   Single                            Admission Date   1/30/24    Admission Type   Urgent    Admitting Provider   Kevin Ramirez DO    Attending Provider   Kevin Ramirez DO    Department, Room/Bed   [facility] INTENSIVE CARE, I012/1       Discharge Date       Discharge Disposition       Discharge Destination                                 Attending Provider: Kevin Ramirez DO    Allergies: No Known Allergies    Isolation: None   Infection: None   Code Status: CPR    Ht: 160 cm (63\")   Wt: 78 kg (171 lb 15.3 oz)    Admission Cmt: None   Principal Problem: Sepsis [A41.9]                   Active Insurance as of 1/30/2024       Primary Coverage       Payor Plan Insurance Group Employer/Plan Group    [employer] [place] [employer] [place]ICA[place]        Payor Plan Address Payor Plan Phone Number Payor Plan Fax Number Effective Dates    [address] [address] [phone]  1/30/2024 - None Entered    [place] [address]         Subscriber Name Subscriber Birth Date Member ID       JAQUI FARRELL 1990 [id number]                     Emergency Contacts        (Rel.) Home Phone Work Phone Mobile Phone    dee dee farrell (Mother) [phone] -- --          Chayo Jaquez RN   Registered Nurse  Nursing     Nursing Note      Addendum     Date of Service: 01/30/24 2202  Creation Time: 01/30/24 2202       Spoke with BARAK Cannon at [facility] about patients possible inhalation of methamphetamine laced with wasp spray 4 days ago.   Kassandra states the symptoms the patient is currently exhibiting do not align with ingestion of organophosphates, carbamates, muscagenic or " "nicotinic poisoning that would be found in commercial insecticides and that retail available insecticides would not be toxic to the patient.   Recommended Serum tox screening for APAP, salicylates and alcohol as well as ABG, Chest xray, CMP with Mag and anion gap, CK, CBC. Monitor on Telemetry and perform EKG.  Poison Control will follow.         2230  Poison control called back and said she looked it up and found out that something called “Wasp Dope” started in the [facility] area and is spreading because it is cheap and readily   available. Can be used with or without meth to get high.     Active ingredient would be pyrethrine and can cause:     Headache N/v tremors redness of skin and itching: burning.     She didn’t mention any antidotes or other treatment recommendations.                History & Physical        Mar Valentino DO at 01/30/24 6097              [facility] Medicine Services  HISTORY AND PHYSICAL    Date of Admission: 1/30/2024  Primary Care Physician: Vanesa Guevara APRN    Subjective   Primary Historian: Patient    Chief Complaint: Swelling    History of Present Illness  33-year-old female who presents as a transfer from [facility].  Patient states that she came to the hospital because her hands or feet were swollen and she became unable to walk due to the swelling.  The patient has been scratching at her legs due to itching.  She has known hepatitis C, and was diagnosed a couple years ago.  She notes she has not had any treatment.  She states she has had some diarrhea.  She has had no vomiting.  She used methamphetamine about 4 days ago.  A friend told her that when she felt like this in the past that someone had put wasp spray in with the methamphetamine.  Nursing staff in the unit called poison control.  They did investigation and found that there was a new drug called \"wasp dope.\"  An active ingredient is pyrethrine and can cause " headache, nausea and vomiting, tremors, redness of the skin and itching and burning.  There is no antidote or other treatment recommendations that were given at the time.  The patient notes a fever of 102 at home.  She denies any cough or nasal congestion.    Labs at transferring facility include a sodium of 126 potassium 4.3 chloride 87 CO2 19.7 BUN 55 creatinine 3.3 albumin of 1.6 ALT 47 alk phos of 185 AST of 40 total bilirubin of 2.7 lactic acid of 8.2 she rare bacteria noted in her urine.  Negative for strep INR is 1.3 positive for methamphetamine negative for flu negative for COVID white count was 23.3 hemoglobin 10.2 hematocrit 29.3 and platelets were 64.  I did not find chest x-ray.        Review of Systems   Constitutional:  Positive for fever.   Cardiovascular:  Positive for leg swelling.   Gastrointestinal:  Positive for diarrhea.      Otherwise complete ROS reviewed and negative except as mentioned in the HPI.    Past Medical History:   Past Medical History:   Diagnosis Date    Anxiety     Depressed     GERD (gastroesophageal reflux disease)     Hepatitis C      Past Surgical History:  Past Surgical History:   Procedure Laterality Date     SECTION      x 2    TONSILLECTOMY       Social History:  reports that she has been smoking cigarettes. She has been smoking an average of .25 packs per day. She has never used smokeless tobacco. She reports that she does not drink alcohol.    Family History: None    Allergies:  No Known Allergies    Medications:  Prior to Admission medications    Medication Sig Start Date End Date Taking? Authorizing Provider   gabapentin (NEURONTIN) 300 MG capsule 3 (Three) Times a Day. 10/15/19   Burt Stone MD   omeprazole (priLOSEC) 20 MG capsule Daily. 10/15/19   Burt Stone MD   venlafaxine XR (EFFEXOR-XR) 75 MG 24 hr capsule Daily. 10/14/19   Burt Stone MD     I have utilized all available immediate resources to obtain, update, or review  the patient's current medications (including all prescriptions, over-the-counter products, herbals, cannabis/cannabidiol products, and vitamin/mineral/dietary (nutritional) supplements).    Objective     Vital Signs: BP 94/49   Pulse (!) 155   Temp (!) 101.3 °F (38.5 °C) (Oral)   Resp (!) 39   SpO2 97%   Physical Exam  Vitals reviewed.   Constitutional:       Appearance: She is ill-appearing.   HENT:      Head: Normocephalic and atraumatic.      Right Ear: External ear normal.      Left Ear: External ear normal.      Nose: Nose normal.      Mouth/Throat:      Mouth: Mucous membranes are dry.   Eyes:      General: No scleral icterus.     Conjunctiva/sclera: Conjunctivae normal.   Cardiovascular:      Rate and Rhythm: Regular rhythm. Tachycardia present.      Heart sounds: Normal heart sounds.   Pulmonary:      Effort: Pulmonary effort is normal.      Breath sounds: Normal breath sounds.   Abdominal:      General: There is no distension.      Palpations: Abdomen is soft.      Tenderness: There is no abdominal tenderness.   Musculoskeletal:         General: Swelling present. No tenderness.      Cervical back: Normal range of motion and neck supple.      Right lower leg: Edema present.      Left lower leg: Edema present.   Skin:     General: Skin is warm and dry.      Findings: Erythema present.      Comments: Bilateral lower extremity excoriation.  The bilateral feet are red.  Nonpitting edema in the lower extremities.  Nonpitting edema in the upper extremities.  Mild petechiae in the upper and lower extremities.   Neurological:      Mental Status: She is alert and oriented to person, place, and time.      Cranial Nerves: No cranial nerve deficit.   Psychiatric:         Mood and Affect: Mood normal.         Behavior: Behavior normal.          Results Reviewed:  Lab Results (last 24 hours)       ** No results found for the last 24 hours. **          Imaging Results (Last 24 Hours)       ** No results found for the  last 24 hours. **          I have personally reviewed and interpreted the radiology studies and ECG obtained at time of admission.     Assessment / Plan   Assessment:   Active Hospital Problems    Diagnosis     **Sepsis      Impression:  SIRS  Methamphetamine use  Skin erythema  Hyponatremia  Leukocytosis  Transaminitis with hepatitis C  Hypoalbuminemia  Acute kidney injury  Lactic acidosis  Anemia  Thrombocytopenia    Treatment Plan  Admit to the ICU  Vancomycin and Zosyn  IV Benadryl  Normal saline IV fluids  Albumin infusion x 1  Follow-up labs in the morning  Stat lactic acid  KUB  Poison control's been contacted  Chest x-ray  CK    The patient will be admitted to my service here at [facility].  Primary team to take over the morning    Medical Decision Making  Number and Complexity of problems: Greater than 4, complex  Differential Diagnosis: Pneumonia    Conditions and Status        Condition is unchanged.     MDM Data  External documents reviewed: Reviewed  Cardiac tracing (EKG, telemetry) interpretation: None  Radiology interpretation: None  Labs reviewed: Reviewed  Any tests that were considered but not ordered: None     Decision rules/scores evaluated (example [id number], Wells, etc): None     Discussed with: Patient     Care Planning  Shared decision making: Patient and nursing staff  Code status and discussions: Full    Disposition  Social Determinants of Health that impact treatment or disposition: Methamphetamine use  Estimated length of stay is 2 to 3 days.     I confirmed that the patient's advanced care plan is present, code status is documented, and a surrogate decision maker is listed in the patient's medical record.     The patient's surrogate decision maker is family.     The patient was seen and examined by me on 1/30/2024 at 1030.    Electronically signed by Mar Valentino DO, 01/30/24, 22:55 CST.                Electronically signed by Mar Valentino DO at 01/30/24  2306       Vital Signs (last 2 days)       Date/Time Temp Temp src Pulse Resp BP Patient Position SpO2    01/31/24 0815 -- -- 137 -- 100/55 -- 97    01/31/24 0742 99.2 (37.3) Oral -- 36 -- -- --    01/31/24 0730 -- -- 124 -- 107/58 -- 95    01/31/24 0715 -- -- 134 -- 100/56 -- 95    01/31/24 0700 -- -- 134 -- 96/63 -- 96    01/31/24 0600 -- -- 131 32 97/56 -- 97    01/31/24 0545 -- -- 132 -- 96/58 -- 97    01/31/24 0530 -- -- 130 -- 97/52 -- 97    01/31/24 0515 -- -- 128 -- 94/60 -- 98    01/31/24 0500 -- -- 128 24 91/59 -- 97    01/31/24 0445 -- -- 133 -- 102/64 -- 97    01/31/24 0430 -- -- 128 -- 95/54 -- 97    01/31/24 0415 -- -- 128 -- 101/57 -- 97    01/31/24 0400 98.3 (36.8) Oral 126 40 99/55 Lying 96    01/31/24 0345 -- -- 125 -- 99/63 -- 96    01/31/24 0330 -- -- 125 -- 96/55 -- 96    01/31/24 0315 -- -- 128 -- 97/51 -- 98    01/31/24 0300 -- -- 128 25 98/54 -- 98    01/31/24 0246 -- -- -- -- 94/47 -- --    01/31/24 0245 -- -- 126 -- 94/47 -- 99    01/31/24 0230 -- -- 127 -- 95/49 -- 99    01/31/24 0215 -- -- 130 -- 87/49 -- 99    01/31/24 0200 -- -- 132 30 93/47 -- 99    01/31/24 0145 -- -- 134 -- 93/44 -- 99    01/31/24 0130 -- -- 139 -- 88/49 -- 99    01/31/24 0115 -- -- 141 -- 72/37 -- 99    01/31/24 0100 -- -- 144 32 81/53 -- 98    01/31/24 0045 -- -- 144 -- 76/41 -- 97    01/31/24 0030 -- -- 148 -- 85/52 -- 97    01/31/24 0015 -- -- 148 -- 83/55 -- 97    01/31/24 0000 98.6 (37) Oral 153 35 89/61 Lying 96    01/30/24 2345 -- -- 148 -- -- -- 96    01/30/24 2330 -- -- 150 -- 88/52 -- 96    01/30/24 2315 -- -- 153 -- 86/51 -- 99    01/30/24 2300 -- -- 154 38 91/56 -- 98    01/30/24 2245 -- -- 155 -- 89/54 -- 98    01/30/24 2230 -- -- 159 -- 79/40 -- 99    01/30/24 2215 -- -- 158 40 93/56 -- 97    01/30/24 2200 -- -- 155 39 94/49 -- 97    01/30/24 2148 101.3 (38.5) Oral 157 36 92/50 Lying 97          Oxygen Therapy (last 2 days)       Date/Time SpO2 Device (Oxygen Therapy) Flow (L/min) Oxygen Concentration  (%) ETCO2 (mmHg)    01/31/24 0815 97 -- -- -- --    01/31/24 0800 -- room air -- -- --    01/31/24 0730 95 -- -- -- --    01/31/24 0715 95 -- -- -- --    01/31/24 0700 96 -- -- -- --    01/31/24 0600 97 -- -- -- --    01/31/24 0545 97 -- -- -- --    01/31/24 0530 97 -- -- -- --    01/31/24 0515 98 -- -- -- --    01/31/24 0500 97 -- -- -- --    01/31/24 0445 97 -- -- -- --    01/31/24 0430 97 -- -- -- --    01/31/24 0415 97 -- -- -- --    01/31/24 0400 96 room air -- -- --    01/31/24 0345 96 -- -- -- --    01/31/24 0330 96 -- -- -- --    01/31/24 0315 98 -- -- -- --    01/31/24 0300 98 -- -- -- --    01/31/24 0245 99 -- -- -- --    01/31/24 0230 99 -- -- -- --    01/31/24 0215 99 -- -- -- --    01/31/24 0200 99 -- -- -- --    01/31/24 0145 99 -- -- -- --    01/31/24 0130 99 -- -- -- --    01/31/24 0115 99 -- -- -- --    01/31/24 0100 98 -- -- -- --    01/31/24 0045 97 -- -- -- --    01/31/24 0030 97 -- -- -- --    01/31/24 0015 97 -- -- -- --    01/31/24 0000 96 room air -- -- --    01/30/24 2345 96 -- -- -- --    01/30/24 2330 96 -- -- -- --    01/30/24 2315 99 -- -- -- --    01/30/24 2300 98 -- -- -- --    01/30/24 2245 98 -- -- -- --    01/30/24 2230 99 -- -- -- --    01/30/24 2215 97 -- -- -- --    01/30/24 2200 97 -- -- -- --    01/30/24 2148 97 room air -- -- --          Intake & Output (last 2 days)         01/29 0701 01/30 0700 01/30 0701 01/31 0700 01/31 0701 02/01 0700    I.V. (mL/kg)   424.6 (5.4)    IV Piggyback   54    Total Intake(mL/kg)   478.6 (6.1)    Urine (mL/kg/hr)  800 660 (3)    Total Output  800 660    Net  -800 -181.4           Urine Unmeasured Occurrence  1 x           Facility-Administered Medications as of 1/31/2024   Medication Dose Route Frequency Provider Last Rate Last Admin    acetaminophen (TYLENOL) tablet 650 mg  650 mg Oral Q4H PRN Mar Valentino DO        Or    acetaminophen (TYLENOL) suppository 325 mg  325 mg Rectal Q4H PRN Mar Valentino DO        [COMPLETED]  albumin human 25 % IV SOLN 12.5 g  12.5 g Intravenous Once Mar Valentino DO   12.5 g at 01/31/24 0151    [COMPLETED] Chlorhexidine Gluconate Cloth 2 % pads 1 Application  1 Application Topical Once Mar Valentino DO   1 Application at 01/31/24 0038    Chlorhexidine Gluconate Cloth 2 % pads 1 Application  1 Application Topical Q24H Mar Valentino DO   1 Application at 01/31/24 0308    diphenhydrAMINE (BENADRYL) injection 25 mg  25 mg Intravenous Q6H PRN Mar Valentino DO        gabapentin (NEURONTIN) capsule 100 mg  100 mg Oral TID Mar Valentino DO   100 mg at 01/31/24 0110    mupirocin (BACTROBAN) 2 % nasal ointment 1 Application  1 Application Each Nare BID Mar Valentino DO   1 Application at 01/31/24 0102    nitroglycerin (NITROSTAT) SL tablet 0.4 mg  0.4 mg Sublingual Q5 Min PRN Mar Valentino DO        norepinephrine (LEVOPHED) 8 mg in 250 mL NS infusion (premix)  0.02-0.3 mcg/kg/min Intravenous Titrated Mar Valentino DO   Stopped at 01/31/24 0745    ondansetron (ZOFRAN) injection 4 mg  4 mg Intravenous Q6H PRN Mar Valentino DO        pantoprazole (PROTONIX) EC tablet 40 mg  40 mg Oral Q AM Mar Valentino DO   40 mg at 01/31/24 0524    [COMPLETED] piperacillin-tazobactam (ZOSYN) IVPB 3.375 g in 100 mL NS (CD)  3.375 g Intravenous Once Mar Valentino DO   3.375 g at 01/31/24 0156    piperacillin-tazobactam (ZOSYN) IVPB 3.375 g in 100 mL NS (CD)  3.375 g Intravenous Q8H Mar Valentino DO   3.375 g at 01/31/24 0525    [COMPLETED] sodium chloride 0.9 % bolus 1,000 mL  1,000 mL Intravenous Once Mar Valentino DO 2,000 mL/hr at 01/31/24 0151 1,000 mL at 01/31/24 0151    sodium chloride 0.9 % flush 10 mL  10 mL Intravenous Q12H Mar Valentino,    10 mL at 01/31/24 0152    sodium chloride 0.9 % flush 10 mL  10 mL Intravenous PRN Mar Valentino DO        sodium chloride 0.9 % infusion 40 mL  40 mL Intravenous PRN Mar Valentino,          sodium chloride 0.9 % infusion  125 mL/hr Intravenous Continuous Mar Valentino  mL/hr at 01/31/24 0157 125 mL/hr at 01/31/24 0157    [COMPLETED] vancomycin IVPB 1500 mg in 0.9% NaCl (Premix) 500 mL  1,500 mg Intravenous Once Mar Valentino .3 mL/hr at 01/31/24 0152 1,500 mg at 01/31/24 0152    Followed by    [START ON 2/1/2024] vancomycin 750 mg/250 mL 0.9% NS IVPB ([facility])  750 mg Intravenous Q24H Mar Valentino DO        venlafaxine XR (EFFEXOR-XR) 24 hr capsule 75 mg  75 mg Oral Daily Mar Valentino DO         Orders (last 48 hrs)        Start     Ordered    02/01/24 0000  vancomycin 750 mg/250 mL 0.9% NS IVPB ([facility])  Every 24 Hours        [facility] for [facility] Orders Report.    01/30/24 2330    01/31/24 0900  venlafaxine XR (EFFEXOR-XR) 24 hr capsule 75 mg  Daily         01/30/24 2255    01/31/24 0707  STAT Lactic Acid, Reflex  PROCEDURE ONCE         01/31/24 0448    01/31/24 0700  Vascular Access Consult  Once,   Status:  Canceled        Provider:  (Not yet assigned)    01/31/24 0241 01/31/24 0600  pantoprazole (PROTONIX) EC tablet 40 mg  Every Early Morning         01/30/24 2255 01/31/24 0600  piperacillin-tazobactam (ZOSYN) IVPB 3.375 g in 100 mL NS (CD)  Every 8 Hours         01/30/24 2255 01/31/24 0600  CBC Auto Differential  Morning Draw         01/30/24 2255 01/31/24 0600  Comprehensive Metabolic Panel  Morning Draw         01/30/24 2255 01/31/24 0600  Magnesium  Morning Draw         01/30/24 2255 01/31/24 0600  CK  Morning Draw         01/30/24 2255 01/31/24 0600  Protime-INR  Morning Draw         01/31/24 0241 01/31/24 0600  aPTT  Morning Draw         01/31/24 0241    01/31/24 0409  CBC (No Diff)  Once         01/31/24 0408    01/31/24 0409  Comprehensive Metabolic Panel  Once         01/31/24 0408    01/31/24 0400  Chlorhexidine Gluconate Cloth 2 % pads 1 Application  Every 24 Hours         01/30/24 2255    01/31/24 0330  norepinephrine  (LEVOPHED) 8 mg in 250 mL NS infusion (premix)  Titrated         01/31/24 0241    01/31/24 0250  STAT Lactic Acid, Reflex  PROCEDURE ONCE         01/31/24 0021    01/31/24 0034  Manual Differential  Once         01/31/24 0033    01/31/24 0000  piperacillin-tazobactam (ZOSYN) IVPB 3.375 g in 100 mL NS (CD)  Once         01/30/24 2255    01/31/24 0000  vancomycin IVPB 1500 mg in 0.9% NaCl (Premix) 500 mL  Once        See [facility] for full Linked Orders Report.    01/30/24 2330    01/30/24 2346  Blood Gas, Arterial -  PROCEDURE ONCE         01/30/24 2346    01/30/24 2345  gabapentin (NEURONTIN) capsule 100 mg  3 Times Daily         01/30/24 2255    01/30/24 2345  sodium chloride 0.9 % flush 10 mL  Every 12 Hours Scheduled         01/30/24 2255    01/30/24 2345  mupirocin (BACTROBAN) 2 % nasal ointment 1 Application  2 Times Daily         01/30/24 2255    01/30/24 2345  Chlorhexidine Gluconate Cloth 2 % pads 1 Application  Once         01/30/24 2255    01/30/24 2345  sodium chloride 0.9 % bolus 1,000 mL  Once         01/30/24 2255    01/30/24 2345  sodium chloride 0.9 % infusion  Continuous         01/30/24 2255    01/30/24 2345  albumin human 25 % IV SOLN 12.5 g  Once         01/30/24 2255    01/30/24 2305  Fibrin Split Products  Once         01/30/24 2304    01/30/24 2300  Vital Signs Every Hour and Per Hospital Policy Based on Patient Condition  Every Hour       01/30/24 2255    01/30/24 2300  Intake & Output  Every Hour       01/30/24 2255    01/30/24 2256  Daily Weights  Daily       01/30/24 2255 01/30/24 2256  Blood Culture - Blood, Arm, Left  Once        See [facility] for full Linked Orders Report.    01/30/24 2255    01/30/24 2256  Blood Culture - Blood, Arm, Left  Once        See [facility] for full Linked Orders Report.    01/30/24 2255    01/30/24 2255  XR Abdomen KUB  1 Time Imaging         01/30/24 2255    01/30/24 2255  Blood Gas, Arterial -  STAT         01/30/24 2255 01/30/24 2255  XR Chest 1  View  1 Time Imaging         01/30/24 2255 01/30/24 2254  ondansetron (ZOFRAN) injection 4 mg  Every 6 Hours PRN         01/30/24 2255 01/30/24 2254  acetaminophen (TYLENOL) tablet 650 mg  Every 4 Hours PRN        See [facility] for full Linked Orders Report.    01/30/24 2255 01/30/24 2254  acetaminophen (TYLENOL) suppository 325 mg  Every 4 Hours PRN        See [facility] for full Linked Orders Report.    01/30/24 2255 01/30/24 2254  Inpatient Admission  Once         01/30/24 2255 01/30/24 2254  Code Status and Medical Interventions:  Continuous         01/30/24 2255    01/30/24 2254  Place Sequential Compression Device  Once         01/30/24 2255 01/30/24 2254  Maintain Sequential Compression Device  Continuous         01/30/24 2255 01/30/24 2254  Diet: Regular/House Diet; Texture: Regular Texture (IDDSI 7); Fluid Consistency: Thin (IDDSI 0)  Diet Effective Now         01/30/24 2255 01/30/24 2254  Lactic Acid, Plasma  STAT         01/30/24 2255 01/30/24 2253  diphenhydrAMINE (BENADRYL) injection 25 mg  Every 6 Hours PRN         01/30/24 2255 01/30/24 2253  Continuous Cardiac Monitoring  Continuous        Comments: Follow Standing Orders As Outlined in Process Instructions (Open Order Report to View Full Instructions)    01/30/24 2255 01/30/24 2253  Telemetry - Maintain IV Access  Continuous,   Status:  Canceled         01/30/24 2255    01/30/24 2253  Telemetry - Place Orders & Notify Provider of Results When Patient Experiences Acute Chest Pain, Dysrhythmia or Respiratory Distress  Until Discontinued         01/30/24 2255 01/30/24 2253  Continuous Pulse Oximetry  Continuous         01/30/24 2255 01/30/24 2253  Height & Weight  Once         01/30/24 2255 01/30/24 2253  Oral Care - Patient Not on NPPV & Not Intubated  Every Shift       01/30/24 2255 01/30/24 2253  Target Arousal Level RASS 0 to -1  Continuous         01/30/24 2255 01/30/24 2253  Use Mobility Guidelines  for Advancement of Activity  Continuous         01/30/24 2255 01/30/24 2253  Insert Peripheral IV  Once         01/30/24 2255 01/30/24 2253  Saline Lock & Maintain IV Access  Continuous         01/30/24 2255 01/30/24 2252  Pharmacy to dose vancomycin  Continuous PRN,   Status:  Discontinued         01/30/24 2255 01/30/24 2252  nitroglycerin (NITROSTAT) SL tablet 0.4 mg  Every 5 Minutes PRN         01/30/24 2255 01/30/24 2252  sodium chloride 0.9 % flush 10 mL  As Needed         01/30/24 2255 01/30/24 2252  sodium chloride 0.9 % infusion 40 mL  As Needed         01/30/24 2255

## 2024-01-31 NOTE — PROGRESS NOTES
"Pharmacy Dosing Service  Pharmacokinetics  Vancomycin Follow-up Evaluation    Assessment/Action/Plan:  Current Order: Vancomycin 750 mg IVPB every 24 hours  Current end date: Final dose 2/3 @ 16:30  Levels: Trough ordered before dose on 2/2  Additional antimicrobial agent(s): None    Pharmacy adjusted vancomycin to 1250 mg every 24 hours to start 1/31 at 16:30.  Dose is appropriate based on indication, predicted pharmacokinetics (see below), and patient factors (renal function).  Pharmacy will continue to follow daily and adjust dose accordingly.     Subjective:  Jaqui Kate is a 33 y.o. female currently on Vancomycin 1250 mg IV every 24 hours for the treatment of sepsis, day 1 of 3 of treatment.    AUC Model Data:  Regimen: 1250 mg IV every 24 hours.  Start time: 16:30 on 01/31/2024  Exposure target: AUC24 (range)400-600 mg/L.hr   AUC24,ss: 524 mg/L.hr  PAUC*: 78 %  Ctrough,ss: 16 mg/L  Pconc*: 30 %  Tox.: 11 %    Objective:  Ht: 160 cm (63\"); Wt: 78 kg (171 lb 15.3 oz)  Estimated Creatinine Clearance: 42.1 mL/min (A) (by C-G formula based on SCr of 1.86 mg/dL (H)).   Creatinine   Date Value Ref Range Status   01/31/2024 1.86 (H) 0.57 - 1.00 mg/dL Final   01/30/2024 2.35 (H) 0.57 - 1.00 mg/dL Final      Lab Results   Component Value Date    WBC 21.12 (H) 01/31/2024    WBC 27.09 (H) 01/30/2024       No results found for: \"VANCOPEAK\", \"VANCOTROUGH\", \"VANCORANDOM\"    Culture Results:  Microbiology Results (last 10 days)       Procedure Component Value - Date/Time    Blood Culture - Blood, Arm, Left [[id number]]  (Abnormal) Collected: 01/30/24 2350    Lab Status: Preliminary result Specimen: Blood from Arm, Left Updated: 01/31/24 1241     Blood Culture Abnormal Stain     Gram Stain Pediatric Bottle Gram positive cocci in clusters    Blood Culture ID, PCR - Blood, Arm, Left [[id number]]  (Abnormal) Collected: 01/30/24 2350    Lab Status: Final result Specimen: Blood from Arm, Left Updated: 01/31/24 1359     BCID, PCR " Staph aureus. mecA/C and MREJ (methicillin resistance gene) detected. Identification by BCID2 PCR.     BOTTLE TYPE Pediatric Bottle    Narrative:      Infectious disease consultation is highly recommended to rule out distant foci of infection.            Claritza James PharmRENO   01/31/24 16:04 CST

## 2024-01-31 NOTE — TELEPHONE ENCOUNTER
CONSULT Odalys @ [phone] Jaqui Kate : 1990 pt in ICU12 pre: James re: sepsis of unknown origin, possible endocarditis.  (Sent to [facility])

## 2024-01-31 NOTE — PLAN OF CARE
Goal Outcome Evaluation:  Plan of Care Reviewed With: patient, family, caregiver        Progress: no change  Outcome Evaluation: Ntn assessment completed. Pt with dry oral mucosa and dry,cracked lips. Advised of alternate menu selections available. Obtained food preferences. Recommend avoiding citrus fruit and juices. Mighty Shakes TID. Yogurt BID. Cont to follow for plan of care.

## 2024-01-31 NOTE — CASE MANAGEMENT/SOCIAL WORK
Discharge Planning Assessment   [place]     Patient Name: Jaqui Kate  MRN: [id number]  Today's Date: 1/31/2024    Admit Date: 1/30/2024        Discharge Needs Assessment       Row Name 01/31/24 1017       Living Environment    People in Home significant other;child(bharti), dependent;parent(s)    Name(s) of People in Home Mother and 13 yo child    Current Living Arrangements home    Potentially Unsafe Housing Conditions none    In the past 12 months has the electric, gas, oil, or water company threatened to shut off services in your home? No    Primary Care Provided by self    Provides Primary Care For no one    Family Caregiver if Needed significant other    Able to Return to Prior Arrangements yes       Resource/Environmental Concerns    Resource/Environmental Concerns none       Transportation Needs    In the past 12 months, has lack of transportation kept you from medical appointments or from getting medications? no    In the past 12 months, has lack of transportation kept you from meetings, work, or from getting things needed for daily living? No       Food Insecurity    Within the past 12 months, you worried that your food would run out before you got the money to buy more. Never true    Within the past 12 months, the food you bought just didn't last and you didn't have money to get more. Never true       Transition Planning    Patient/Family Anticipates Transition to home with family    Transportation Anticipated family or friend will provide       Discharge Needs Assessment    Readmission Within the Last 30 Days no previous admission in last 30 days    Equipment Currently Used at Home none    Concerns to be Addressed no discharge needs identified    Equipment Needed After Discharge none    Discharge Coordination/Progress spoke to significant other who patient lives with and is indpendent at home prior to illness; has RX coverage and PCP; will follow for DC needs                   Discharge Plan    No  documentation.                 Continued Care and Services - Admitted Since 1/30/2024    Coordination has not been started for this encounter.          Demographic Summary    No documentation.                  Functional Status    No documentation.                  Psychosocial    No documentation.                  Abuse/Neglect    No documentation.                  Legal    No documentation.                  Substance Abuse    No documentation.                  Patient Forms    No documentation.                     Deirdre Wong RN

## 2024-01-31 NOTE — PLAN OF CARE
Problem: Skin Injury Risk Increased  Goal: Skin Health and Integrity  Intervention: Optimize Skin Protection  Description: Perform a full pressure injury risk assessment, as indicated by screening, upon admission to care unit.  Reassess skin (injury risk, full inspection) frequently (e.g., scheduled interval, with change in condition) to provide optimal early detection and prevention.  Maintain adequate tissue perfusion (e.g., encourage fluid balance; avoid crossing legs, constrictive clothing or devices) to promote tissue oxygenation.  Maintain head of bed at lowest degree of elevation tolerated, considering medical condition and other restrictions.  Avoid positioning onto an area that remains reddened.  Minimize incontinence and moisture (e.g., toileting schedule; moisture-wicking pad, diaper or incontinence collection device; skin moisture barrier).  Cleanse skin promptly and gently when soiled utilizing a pH-balanced cleanser.  Relieve and redistribute pressure (e.g., scheduled position changes, weight shifts, use of support surface, medical device repositioning, protective dressing application, use of positioning device, microclimate control, use of pressure-injury-monitor  Encourage increased activity, such as sitting in a chair at the bedside or early mobilization, when able to tolerate.  Recent Flowsheet Documentation  Taken 1/31/2024 0600 by Demetra Jose RN  Head of Bed ([facility]) Positioning: HOB elevated  Taken 1/31/2024 0400 by Demetra Jose RN  Head of Bed ([facility]) Positioning: HOB elevated  Taken 1/31/2024 0200 by Demetra Jose RN  Head of Bed ([facility]) Positioning: HOB elevated  Taken 1/31/2024 0000 by Deemtra Jose RN  Pressure Reduction Techniques: weight shift assistance provided  Head of Bed ([facility]) Positioning: HOB elevated  Pressure Reduction Devices: specialty bed utilized  Skin Protection: adhesive use limited  Taken 1/30/2024 2200 by Demetra Jose RN  Head of Bed ([facility])  Positioning: HOB elevated  Taken 1/30/2024 2148 by Demetra Jose, RN  Pressure Reduction Techniques: weight shift assistance provided  Head of Bed (HOB) Positioning: [facility] elevated  Pressure Reduction Devices: specialty bed utilized  Skin Protection: adhesive use limited   Goal Outcome Evaluation:

## 2024-01-31 NOTE — CONSULTS
LOS: 1 day   Patient Care Team:  Vanesa Guevara APRN as PCP - General (Family Medicine)    Chief Complaint: Overall not feeling well.     Subjective    Jaqui Kate is a 33 y.o. female who is being seen in consultation.  Patient admitted  Overall feels weak and tired  Has swelling of her mouth  Noted to have sepsis of unknown etiology  Echocardiogram was ordered  Has preserved biventricular function  Has findings consistent with tricuspid valve endocarditis  Due to abnormality in the echo cardiology consult was obtained  Patient overall somnolent but able to discuss   by bedside  Denies any high-grade fever or chills  Lactic acid level was elevated  Patient has significant leukocytosis with normochromic anemia and marked thrombocytopenia  Is in renal failure  Creatinine of 1.9 with GFR of 36  Latest test results including imaging and graphics reviewed    Review of Systems   Constitutional: No chills   Has fatigue   No fever.   HENT: Negative.    Eyes: Negative.    Respiratory: Negative for cough,   No chest wall soreness,   Shortness of breath,   no wheezing, no stridor.    Cardiovascular: As above  Gastrointestinal: Negative for abdominal distention,  No abdominal pain,   No blood in stool,   No constipation,   No diarrhea,   No nausea   No vomiting.   Endocrine: Negative.    Genitourinary: Negative for difficulty urinating, dysuria, flank pain and hematuria.   Musculoskeletal: Negative.    Skin: Negative for rash and wound.   Allergic/Immunologic: Negative.    Neurological: Negative for dizziness, syncope, weakness,   No light-headedness  No  headaches.   Hematological: Does not bruise/bleed easily.   Psychiatric/Behavioral: Negative for agitation or behavioral problems,   No confusion,   the patient is  nervous/anxious.       History:   Past Medical History:   Diagnosis Date    Anxiety     Depressed     GERD (gastroesophageal reflux disease)     Hepatitis C      Past Surgical History:   Procedure  Laterality Date     SECTION      x 2    TONSILLECTOMY       Social History     Socioeconomic History    Marital status: Single   Tobacco Use    Smoking status: Every Day     Packs/day: .25     Types: Cigarettes    Smokeless tobacco: Never   Substance and Sexual Activity    Alcohol use: No     Family History   Problem Relation Age of Onset    Colon cancer Neg Hx     Colon polyps Neg Hx        Labs:  WBC WBC   Date Value Ref Range Status   2024 21.12 (H) 3.40 - 10.80 10*3/mm3 Final   2024 27.09 (H) 3.40 - 10.80 10*3/mm3 Final      HGB Hemoglobin   Date Value Ref Range Status   2024 7.7 (L) 12.0 - 15.9 g/dL Final   2024 9.1 (L) 12.0 - 15.9 g/dL Final      HCT Hematocrit   Date Value Ref Range Status   2024 22.8 (L) 34.0 - 46.6 % Final   2024 27.5 (L) 34.0 - 46.6 % Final      Platelets Platelets   Date Value Ref Range Status   2024 41 (C) 140 - 450 10*3/mm3 Final   2024 46 (C) 140 - 450 10*3/mm3 Final      MCV MCV   Date Value Ref Range Status   2024 80.9 79.0 - 97.0 fL Final   2024 82.3 79.0 - 97.0 fL Final        Results from last 7 days   Lab Units 24  0409 24  2350   SODIUM mmol/L 132* 128*   POTASSIUM mmol/L 3.3* 3.4*   CHLORIDE mmol/L 97* 92*   CO2 mmol/L 18.0* 17.0*   BUN mg/dL 58* 59*   CREATININE mg/dL 1.86* 2.35*   CALCIUM mg/dL 7.1* 7.8*   BILIRUBIN mg/dL 2.5* 2.3*   ALK PHOS U/L 143* 163*   ALT (SGPT) U/L 27 32   AST (SGOT) U/L 23 26   GLUCOSE mg/dL 97 73     Lab Results   Component Value Date    CKTOTAL 76 2024     PT/INR:    Protime   Date Value Ref Range Status   2024 16.2 (H) 11.8 - 14.8 Seconds Final   /  INR   Date Value Ref Range Status   2024 1.29 (H) 0.91 - 1.09 Final       Imaging Results (Last 72 Hours)       Procedure Component Value Units Date/Time    XR Abdomen KUB [[id number]] Collected: 24     Updated: 24    Narrative:      XR ABDOMEN KUB- 2024 2:17 AM     HISTORY:  Diarrhea     COMPARISON: None     FINDINGS:     There is a nonobstructive bowel gas pattern. No gross intraperitoneal  free air is present. No urolithiasis identified. The osseous structures  demonstrate no acute process.       Impression:         1.  No radiographic evidence of acute abdominopelvic process.           This report was signed and finalized on 1/31/2024 7:05 AM by Dr Apolinar Coronado.       XR Chest 1 View [[id number]] Collected: 01/31/24 0656     Updated: 01/31/24 0701    Narrative:      XR CHEST 1 VW- 1/31/2024 2:14 AM     HISTORY: Sepsis       COMPARISON: None     FINDINGS:  Upright frontal radiograph of the chest was obtained     Bilateral interstitial coarsening. Lungs are well expanded. No  consolidation or pleural effusion. No pneumothorax. Heart size is  normal. No acute bony abnormality seen.       Impression:      1.  Bilateral interstitial coarsening which may reflect a mild  interstitial edema. There is no consolidation or pleural effusion. Lungs  are well expanded.     This report was signed and finalized on 1/31/2024 6:58 AM by Dr Apolinar Coronado.               Objective     No Known Allergies    Medication Review: Performed  Current Facility-Administered Medications   Medication Dose Route Frequency Provider Last Rate Last Admin    acetaminophen (TYLENOL) tablet 650 mg  650 mg Oral Q4H PRN Mar Valentino DO   650 mg at 01/31/24 1124    Or    acetaminophen (TYLENOL) suppository 325 mg  325 mg Rectal Q4H PRN Mar Valentino DO        Chlorhexidine Gluconate Cloth 2 % pads 1 Application  1 Application Topical Q24H Mar Valentino DO   1 Application at 01/31/24 0308    diphenhydrAMINE (BENADRYL) injection 25 mg  25 mg Intravenous Q6H PRN Mar Valentino DO        gabapentin (NEURONTIN) capsule 100 mg  100 mg Oral TID Mar Valentino DO   100 mg at 01/31/24 0110    mupirocin (BACTROBAN) 2 % nasal ointment 1 Application  1 Application Each Nare BID Mar Valentino DO   1  "Application at 01/31/24 1013    nitroglycerin (NITROSTAT) SL tablet 0.4 mg  0.4 mg Sublingual Q5 Min PRN Mar Valentino DO        norepinephrine (LEVOPHED) 8 mg in 250 mL NS infusion (premix)  0.02-0.3 mcg/kg/min Intravenous Titrated Mar Valentino DO 2.93 mL/hr at 01/31/24 1037 0.02 mcg/kg/min at 01/31/24 1037    ondansetron (ZOFRAN) injection 4 mg  4 mg Intravenous Q6H PRN Mar Valentino DO        pantoprazole (PROTONIX) EC tablet 40 mg  40 mg Oral Q AM Mar Valentino DO   40 mg at 01/31/24 0524    piperacillin-tazobactam (ZOSYN) IVPB 3.375 g in 100 mL NS (CD)  3.375 g Intravenous Q8H Mar Valentino DO   3.375 g at 01/31/24 0525    sodium chloride 0.9 % flush 10 mL  10 mL Intravenous Q12H Mar Valentino DO   10 mL at 01/31/24 1014    sodium chloride 0.9 % flush 10 mL  10 mL Intravenous PRN Mar Valentino DO        sodium chloride 0.9 % infusion 40 mL  40 mL Intravenous PRN Mar Valentino DO        sodium chloride 0.9 % infusion  125 mL/hr Intravenous Continuous Mar Valentino  mL/hr at 01/31/24 1002 125 mL/hr at 01/31/24 1002    [START ON 2/1/2024] vancomycin 750 mg/250 mL 0.9% NS IVPB ([facility])  750 mg Intravenous Q24H Mar Valentino DO        venlafaxine XR (EFFEXOR-XR) 24 hr capsule 75 mg  75 mg Oral Daily Mar Valentino DO           Vital Sign Min/Max for last 24 hours  Temp  Min: 98.3 °F (36.8 °C)  Max: 102 °F (38.9 °C)   BP  Min: 72/37  Max: 111/62   Pulse  Min: 124  Max: 159   Resp  Min: 24  Max: 40   SpO2  Min: 94 %  Max: 99 %   No data recorded   Weight  Min: 78 kg (171 lb 15.3 oz)  Max: 78 kg (171 lb 15.3 oz)     Flowsheet Rows      Flowsheet Row First Filed Value   Admission Height 160 cm (63\") Documented at 01/29/2024 2150   Admission Weight 78 kg (171 lb 15.3 oz) Documented at 01/29/2024 2150            Results for orders placed during the hospital encounter of 01/30/24    Adult Transthoracic Echo Complete W/ Cont if Necessary Per " Protocol    Interpretation Summary    Left ventricular systolic function is normal. Left ventricular ejection fraction appears to be 61 - 65%.    Left ventricular diastolic function was normal.    Estimated right ventricular systolic pressure from tricuspid regurgitation is normal (<35 mmHg).    There is a (20 mm) mass on the tricuspid valve that is consistent with a vegetation.      Physical Exam:    General Appearance: Patient is somnolent but able to communicate  Eyes: Pupils equal and reactive  Mild swelling of her lips noted  Some swelling of her tongue noted  Ears: Appear intact with no abnormalities noted  Nose: Nares normal, no drainage  Neck: supple, trachea midline, no carotid bruit and no JVD  Back: no kyphosis present,    Lungs: respirations regular, respirations even and respirations unlabored  Heart: normal S1, S2, 2/6 systolic murmur left sternal border  No gallops or rubs  no rub and no click  Abdomen: normal bowel sounds, no tenderness   Skin: no bleeding, bruising or rash  Extremities: no cyanosis  Psychiatric/Behavioral: Negative for agitation, behavioral problems, confusion, the patient does  appear to be nervous/anxious.       Results Review:   I reviewed the patient's new clinical results.  I reviewed the patient's new imaging results and agree with the interpretation.  I reviewed the patient's other test results and agree with the interpretation  I personally viewed and interpreted the patient's EKG/Telemetry data    Discussed with patient  Updated patient regarding any new or relevant abnormalities on review of records or any new findings on physical exam.   Mentioned to patient about purpose of visit and desirable health short and long term goals and objectives.     Reviewed available prior notes, consults, prior visits, laboratory findings, radiology and cardiology relevant reports.   Updated chart as applicable.   I have reviewed the patient's medical history in detail and updated the  computerized patient record as relevant.          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 Ramirez, Dr. Becca Beltran and nursing  Appropriate antibiotic therapy  CT surgery has been consulted  Currently patient has swelling in her mouth  Therefore any plans for transesophageal echocardiogram if anticipated oral swelling improves  Treat and monitor hypokalemia  Treat thrombocytopenia as well as treat anemia  Continue on cardiac monitor  Will get EKG  Due to multiple comorbidities including significant abnormalities with multiple organ involvement including anemia, thrombocytopenia, leukocytosis, renal failure as well as elevated liver enzymes overall prognosis is guarded    Ham Beal MD  01/31/24  13:05 CST    EMR Dragon/Transcription was used to dictate part of this note

## 2024-01-31 NOTE — PROGRESS NOTES
[facility] Medicine Services  INPATIENT PROGRESS NOTE    Patient Name: Jaqui Kate  Date of Admission: 1/30/2024  Today's Date: 01/31/24  Length of Stay: 1  Primary Care Physician: Vanesa Guevara APRN    Subjective   Chief Complaint: Hand and foot swelling, lip swelling  HPI     The patient remains ill and is quite somnolent at the time of my evaluation.  White blood cell count is down slightly to 21,100.  Hemoglobin 7.7.  Platelet count 41,000 likely secondary to consumption.  Potassium low at 3.3 with no supplementation ordered currently because of acute kidney injury.  Creatinine has improved to 1.86.  Will continue to monitor.  Bilirubin elevated at 2.5 due to a history of untreated hepatitis C.  No origin for sepsis was determined initially.  Given the patient's history of methamphetamine abuse I ordered an echocardiogram this morning.  Dr. Beal contacted me and inform me that there is a large vegetation attached to the tricuspid valve.  Blood culture is positive for gram-positive cocci in clusters which likely represents Staphylococcus.  Sensitivities pending.  Infectious diseases was consulted and we have discussed the case.  The patient remains on Zosyn and vancomycin until sensitivities return.  CT surgery will be consulted for an opinion regarding the tricuspid vegetation.  She received a sepsis bolus at the [facility] hospital.  Serial labs will be ordered.  Chest x-ray shows bilateral interstitial coarsening.  KUB is unremarkable.    Review of Systems   All pertinent negatives and positives are as above. All other systems have been reviewed and are negative unless otherwise stated.     Objective    Temp:  [98.3 °F (36.8 °C)-102 °F (38.9 °C)] 101.4 °F (38.6 °C)  Heart Rate:  [124-159] 142  Resp:  [24-40] 38  BP: ()/(37-64) 105/53  Physical Exam  Constitutional:       General: She is sleeping.      Appearance: She is normal weight. She is ill-appearing.    HENT:      Head: Normocephalic and atraumatic.      Right Ear: External ear normal.      Left Ear: External ear normal.      Nose: Nose normal.      Mouth/Throat:      Mouth: Mucous membranes are moist.      Pharynx: Oropharynx is clear.   Eyes:      General: No scleral icterus.     Extraocular Movements: Extraocular movements intact.      Conjunctiva/sclera: Conjunctivae normal.      Pupils: Pupils are equal, round, and reactive to light.   Cardiovascular:      Rate and Rhythm: Regular rhythm. Tachycardia present.      Pulses: Normal pulses.      Heart sounds: Murmur heard.      Systolic murmur is present with a grade of 2/6.   Pulmonary:      Effort: Pulmonary effort is normal. No respiratory distress.      Breath sounds: Normal breath sounds.   Abdominal:      General: Abdomen is flat. Bowel sounds are normal.      Palpations: Abdomen is soft. There is no mass.      Tenderness: There is no abdominal tenderness.   Musculoskeletal:         General: Normal range of motion.      Right lower leg: No edema.      Left lower leg: No edema.   Skin:     General: Skin is warm and dry.      Coloration: Skin is not pale.      Findings: Erythema (feet) present.   Neurological:      General: No focal deficit present.      Mental Status: She is oriented to person, place, and time and easily aroused. Mental status is at baseline.      Cranial Nerves: No cranial nerve deficit.   Psychiatric:         Mood and Affect: Mood normal.         Judgment: Judgment normal.       Results Review:  I have reviewed the labs, radiology results, and diagnostic studies.    Laboratory Data:   Results from last 7 days   Lab Units 01/31/24  0409 01/30/24  2350   WBC 10*3/mm3 21.12* 27.09*   HEMOGLOBIN g/dL 7.7* 9.1*   HEMATOCRIT % 22.8* 27.5*   PLATELETS 10*3/mm3 41* 46*        Results from last 7 days   Lab Units 01/31/24  0409 01/30/24  2350   SODIUM mmol/L 132* 128*   POTASSIUM mmol/L 3.3* 3.4*   CHLORIDE mmol/L 97* 92*   CO2 mmol/L 18.0* 17.0*    BUN mg/dL 58* 59*   CREATININE mg/dL 1.86* 2.35*   CALCIUM mg/dL 7.1* 7.8*   BILIRUBIN mg/dL 2.5* 2.3*   ALK PHOS U/L 143* 163*   ALT (SGPT) U/L 27 32   AST (SGOT) U/L 23 26   GLUCOSE mg/dL 97 73       Culture Data:   Blood Culture   Date Value Ref Range Status   01/30/2024 Abnormal Stain (C)  Preliminary       Radiology Data:   Imaging Results (Last 24 Hours)       Procedure Component Value Units Date/Time    XR Abdomen KUB [[id number]] Collected: 01/31/24 0703     Updated: 01/31/24 0708    Narrative:      XR ABDOMEN KUB- 1/31/2024 2:17 AM     HISTORY: Diarrhea     COMPARISON: None     FINDINGS:     There is a nonobstructive bowel gas pattern. No gross intraperitoneal  free air is present. No urolithiasis identified. The osseous structures  demonstrate no acute process.       Impression:         1.  No radiographic evidence of acute abdominopelvic process.           This report was signed and finalized on 1/31/2024 7:05 AM by Dr Apolinar Coronado.       XR Chest 1 View [[id number]] Collected: 01/31/24 0656     Updated: 01/31/24 0701    Narrative:      XR CHEST 1 VW- 1/31/2024 2:14 AM     HISTORY: Sepsis       COMPARISON: None     FINDINGS:  Upright frontal radiograph of the chest was obtained     Bilateral interstitial coarsening. Lungs are well expanded. No  consolidation or pleural effusion. No pneumothorax. Heart size is  normal. No acute bony abnormality seen.       Impression:      1.  Bilateral interstitial coarsening which may reflect a mild  interstitial edema. There is no consolidation or pleural effusion. Lungs  are well expanded.     This report was signed and finalized on 1/31/2024 6:58 AM by Dr Apolinar Coronado.               I have reviewed the patient's current medications.     Assessment/Plan   Assessment  Active Hospital Problems    Diagnosis     **Sepsis associated hypotension     Methamphetamine use     Hyponatremia     Anemia     Thrombocytopenia     Tricuspid valve vegetation     Bacterial  endocarditis     DARRYN (acute kidney injury)     Chronic hepatitis C without hepatic coma        Treatment Plan  Continue Zosyn and vancomycin  Infectious diseases consultation, done  Cardiothoracic surgery consultation  Daily CBC and CMP  Echocardiogram, done    Medical Decision Making  Number and Complexity of problems:   Sepsis with hypotension, acute, high complexity  Bacterial endocarditis with tricuspid vegetation and bacteremia, acute, high complexity, life-threatening  Hepatitis C, chronic, high complexity  Acute kidney injury, acute, moderate complexity  Thrombocytopenia, acute, moderate complexity  Hyponatremia, acute, moderate complexity  Anemia, chronic, moderate complexity  Intravenous methamphetamine abuse, chronic, high complexity    Differential Diagnosis:   None others considered at present    Conditions and Status        Condition is unchanged.     [facility] Data  External documents reviewed: Care Everywhere documentation  Cardiac tracing (EKG, telemetry) interpretation: See HPI  Radiology interpretation: See HPI  Labs reviewed: See HPI  Any tests that were considered but not ordered: None     Decision rules/scores evaluated (example [id number], Wells, etc): None     Discussed with: The patient, cardiology and infectious diseases     Care Planning  Shared decision making: All services consulted  Code status and discussions: Full code    Disposition  Social Determinants of Health that impact treatment or disposition: IV drug abuse  I expect the patient to be discharged to ? in ? days.     Electronically signed by Kevin Ramirez DO, 01/31/24, 13:18 CST.    I spent 60 minutes providing critical care management to this patient. This excludes time spent in performing separately billed procedures.

## 2024-01-31 NOTE — H&P
"    [facility] Medicine Services  HISTORY AND PHYSICAL    Date of Admission: 1/30/2024  Primary Care Physician: Vanesa Guevara APRN    Subjective   Primary Historian: Patient    Chief Complaint: Swelling    History of Present Illness  33-year-old female who presents as a transfer from [facility].  Patient states that she came to the hospital because her hands or feet were swollen and she became unable to walk due to the swelling.  The patient has been scratching at her legs due to itching.  She has known hepatitis C, and was diagnosed a couple years ago.  She notes she has not had any treatment.  She states she has had some diarrhea.  She has had no vomiting.  She used methamphetamine about 4 days ago.  A friend told her that when she felt like this in the past that someone had put wasp spray in with the methamphetamine.  Nursing staff in the unit called poison control.  They did investigation and found that there was a new drug called \"wasp dope.\"  An active ingredient is pyrethrine and can cause headache, nausea and vomiting, tremors, redness of the skin and itching and burning.  There is no antidote or other treatment recommendations that were given at the time.  The patient notes a fever of 102 at home.  She denies any cough or nasal congestion.    Labs at transferring facility include a sodium of 126 potassium 4.3 chloride 87 CO2 19.7 BUN 55 creatinine 3.3 albumin of 1.6 ALT 47 alk phos of 185 AST of 40 total bilirubin of 2.7 lactic acid of 8.2 she rare bacteria noted in her urine.  Negative for strep INR is 1.3 positive for methamphetamine negative for flu negative for COVID white count was 23.3 hemoglobin 10.2 hematocrit 29.3 and platelets were 64.  I did not find chest x-ray.        Review of Systems   Constitutional:  Positive for fever.   Cardiovascular:  Positive for leg swelling.   Gastrointestinal:  Positive for diarrhea.      Otherwise complete ROS " reviewed and negative except as mentioned in the HPI.    Past Medical History:   Past Medical History:   Diagnosis Date   • Anxiety    • Depressed    • GERD (gastroesophageal reflux disease)    • Hepatitis C      Past Surgical History:  Past Surgical History:   Procedure Laterality Date   •  SECTION      x 2   • TONSILLECTOMY       Social History:  reports that she has been smoking cigarettes. She has been smoking an average of .25 packs per day. She has never used smokeless tobacco. She reports that she does not drink alcohol.    Family History: None    Allergies:  No Known Allergies    Medications:  Prior to Admission medications    Medication Sig Start Date End Date Taking? Authorizing Provider   gabapentin (NEURONTIN) 300 MG capsule 3 (Three) Times a Day. 10/15/19   Burt Stone MD   omeprazole (priLOSEC) 20 MG capsule Daily. 10/15/19   Burt Stone MD   venlafaxine XR (EFFEXOR-XR) 75 MG 24 hr capsule Daily. 10/14/19   Burt Stone MD     I have utilized all available immediate resources to obtain, update, or review the patient's current medications (including all prescriptions, over-the-counter products, herbals, cannabis/cannabidiol products, and vitamin/mineral/dietary (nutritional) supplements).    Objective     Vital Signs: BP 94/49   Pulse (!) 155   Temp (!) 101.3 °F (38.5 °C) (Oral)   Resp (!) 39   SpO2 97%   Physical Exam  Vitals reviewed.   Constitutional:       Appearance: She is ill-appearing.   HENT:      Head: Normocephalic and atraumatic.      Right Ear: External ear normal.      Left Ear: External ear normal.      Nose: Nose normal.      Mouth/Throat:      Mouth: Mucous membranes are dry.   Eyes:      General: No scleral icterus.     Conjunctiva/sclera: Conjunctivae normal.   Cardiovascular:      Rate and Rhythm: Regular rhythm. Tachycardia present.      Heart sounds: Normal heart sounds.   Pulmonary:      Effort: Pulmonary effort is normal.      Breath  sounds: Normal breath sounds.   Abdominal:      General: There is no distension.      Palpations: Abdomen is soft.      Tenderness: There is no abdominal tenderness.   Musculoskeletal:         General: Swelling present. No tenderness.      Cervical back: Normal range of motion and neck supple.      Right lower leg: Edema present.      Left lower leg: Edema present.   Skin:     General: Skin is warm and dry.      Findings: Erythema present.      Comments: Bilateral lower extremity excoriation.  The bilateral feet are red.  Nonpitting edema in the lower extremities.  Nonpitting edema in the upper extremities.  Mild petechiae in the upper and lower extremities.   Neurological:      Mental Status: She is alert and oriented to person, place, and time.      Cranial Nerves: No cranial nerve deficit.   Psychiatric:         Mood and Affect: Mood normal.         Behavior: Behavior normal.          Results Reviewed:  Lab Results (last 24 hours)       ** No results found for the last 24 hours. **          Imaging Results (Last 24 Hours)       ** No results found for the last 24 hours. **          I have personally reviewed and interpreted the radiology studies and ECG obtained at time of admission.     Assessment / Plan   Assessment:   Active Hospital Problems    Diagnosis    • **Sepsis      Impression:  SIRS  Methamphetamine use  Skin erythema  Hyponatremia  Leukocytosis  Transaminitis with hepatitis C  Hypoalbuminemia  Acute kidney injury  Lactic acidosis  Anemia  Thrombocytopenia    Treatment Plan  Admit to the ICU  Vancomycin and Zosyn  IV Benadryl  Normal saline IV fluids  Albumin infusion x 1  Follow-up labs in the morning  Stat lactic acid  KUB  Poison control's been contacted  Chest x-ray  CK    The patient will be admitted to my service here at [facility].  Primary team to take over the morning    Medical Decision Making  Number and Complexity of problems: Greater than 4, complex  Differential Diagnosis:  Pneumonia    Conditions and Status        Condition is unchanged.     [facility] Data  External documents reviewed: Reviewed  Cardiac tracing (EKG, telemetry) interpretation: None  Radiology interpretation: None  Labs reviewed: Reviewed  Any tests that were considered but not ordered: None     Decision rules/scores evaluated (example [id number], Wells, etc): None     Discussed with: Patient     Care Planning  Shared decision making: Patient and nursing staff  Code status and discussions: Full    Disposition  Social Determinants of Health that impact treatment or disposition: Methamphetamine use  Estimated length of stay is 2 to 3 days.     I confirmed that the patient's advanced care plan is present, code status is documented, and a surrogate decision maker is listed in the patient's medical record.     The patient's surrogate decision maker is family.     The patient was seen and examined by me on 1/30/2024 at 1030.    Electronically signed by Mar Valentino DO, 01/30/24, 22:55 CST.

## 2024-01-31 NOTE — CONSULTS
"INFECTIOUS DISEASES CONSULT NOTE    Patient:  Jaqui Kate 33 y.o. female  ROOM # I012/1  YOB: 1990  MRN: [id number]  CSN:  [id number]  Admit date: 2024   Admitting Physician: Kevin Ramirez DO  Primary Care Physician: Vanesa Guevara APRN  REFERRING PROVIDER: Alfonso Prado,*    Inpatient Infectious Diseases Consult  Consult performed by: Debby Gastelum MD  Consult ordered by: Kevin Ramirez DO          REASON FOR CONSULTATION : Sepsis of unknown etiology      HISTORY OF PRESENT ILLNESS: Patient is a 33-year-old female who was admitted after transfer from [facility].  Patient very sleepy but does give some information.  Her  did provide some information as well.    Patient has been feeling poorly and had edema for the last 4 to 5 days.  She indicated that she was seen in \"Hop-down\" at [facility] but \"they did not do anything\".  I contacted [facility] and they do not even have her in the system.  I went back and asked the patient if she was actually seen and she said, \"I do not know\".  Her  at bedside stated that, \"they were short staffed and she and her mom left\"      Blood cultures drawn at [facility] prior to transfer   Patient received Unasyn at [facility] prior to transfer  She has received 3 L of IV fluids and has been started on vancomycin and Zosyn here.    She is undergone 2D echo.    Past Medical History:   Diagnosis Date    Anxiety     Depressed     GERD (gastroesophageal reflux disease)     Hepatitis C    Methamphetamine use-injection and snorting      Past Surgical History:   Procedure Laterality Date     SECTION      x 2    TONSILLECTOMY       Family History   Problem Relation Age of Onset    Colon cancer Neg Hx     Colon polyps Neg Hx      Social History     Socioeconomic History    Marital status: Single   Tobacco Use    Smoking status: Every Day     Packs/day: .25     Types: Cigarettes    " "Smokeless tobacco: Never   Substance and Sexual Activity    Alcohol use: No       Current Scheduled Medications:   Chlorhexidine Gluconate Cloth, 1 Application, Topical, Q24H  gabapentin, 100 mg, Oral, TID  mupirocin, 1 Application, Each Nare, BID  pantoprazole, 40 mg, Oral, Q AM  piperacillin-tazobactam, 3.375 g, Intravenous, Q8H  sodium chloride, 10 mL, Intravenous, Q12H  [START ON 2024] vancomycin, 750 mg, Intravenous, Q24H  venlafaxine XR, 75 mg, Oral, Daily              Current PRN Medications:    acetaminophen **OR** acetaminophen    diphenhydrAMINE    nitroglycerin    ondansetron    sodium chloride    sodium chloride      norepinephrine, 0.02-0.3 mcg/kg/min, Last Rate: 0.02 mcg/kg/min (24 1037)  sodium chloride, 125 mL/hr, Last Rate: 125 mL/hr (24 1002)           No Known Allergies        Vital Signs:  /49   Pulse (!) 139   Temp (!) 102 °F (38.9 °C) (Axillary)   Resp (!) 38   Ht 160 cm (63\")   Wt 78 kg (171 lb 15.3 oz)   SpO2 95%   BMI 30.46 kg/m²   Temp (24hrs), Av.9 °F (37.7 °C), Min:98.3 °F (36.8 °C), Max:102 °F (38.9 °C)      Physical Exam    General: Patient is critically ill-appearing very sleepy appearing lying in bed in no respiratory distress  HEENT: No conjunctival petechiae appreciated.  Poor dentition.  Erythematous tongue.  No   Heart: S1 is 2 rate was regular.  Did not appreciate a definite murmur  Lungs: Diminished breath sounds throughout  Extremities: Patient has bilateral upper and lower extremity edema.  She also has some splotchy erythema but I do not appreciate any specific areas of fluctuance or induration.  No focal tenderness.  I was able to move wrist elbow and shoulder passively without any focal area of pain.  Patient did have some petechial appearing changes in the distal lower extremities bilaterally and evidence of linear excoriations    Line/IV site: Peripheral IVs in place 1 on left upper extremity 1 right upper extremity    Results Review:    I " reviewed the patient's new clinical results.    Lab Results:    CBC:   Lab Results   Lab 01/30/24 2350 01/31/24 0409   WBC 27.09* 21.12*   HEMOGLOBIN 9.1* 7.7*   HEMATOCRIT 27.5* 22.8*   PLATELETS 46* 41*        AutoDiff:   Lab Results   Lab 01/30/24 2350   NEUTROS ABS 26.01*        Manual Diff:    Lab Results   Lab 01/30/24 2350   NEUTROPHIL % 95.0*   LYMPHOCYTE % 2.0*   MONOCYTES % 2.0*   BANDS % 1.0   NEUTROS ABS 26.01*   LYMPHS ABS 0.54*   ANISOCYTOSIS Slight/1+   POIKILOCYTES Slight/1+        CMP:   Lab Results   Lab 01/30/24 2350 01/31/24 0409   SODIUM 128* 132*   POTASSIUM 3.4* 3.3*   CHLORIDE 92* 97*   CO2 17.0* 18.0*   BUN 59* 58*   CREATININE 2.35* 1.86*   CALCIUM 7.8* 7.1*   BILIRUBIN 2.3* 2.5*   ALK PHOS 163* 143*   ALT (SGPT) 32 27   AST (SGOT) 26 23   GLUCOSE 73 97       Lab Results (last 72 hours)       Procedure Component Value Units Date/Time    STAT Lactic Acid, Reflex [[id number]]  (Abnormal) Collected: 01/31/24 1057    Specimen: Blood Updated: 01/31/24 1141     Lactate 2.9 mmol/L     STAT Lactic Acid, Reflex [[id number]]  (Abnormal) Collected: 01/31/24 0803    Specimen: Blood Updated: 01/31/24 1017     Lactate 3.0 mmol/L     CBC (No Diff) [[id number]]  (Abnormal) Collected: 01/31/24 0409    Specimen: Blood Updated: 01/31/24 0453     WBC 21.12 10*3/mm3      RBC 2.82 10*6/mm3      Hemoglobin 7.7 g/dL      Hematocrit 22.8 %      MCV 80.9 fL      MCH 27.3 pg      MCHC 33.8 g/dL      RDW 14.4 %      RDW-SD 42.1 fl      MPV 13.2 fL      Platelets 41 10*3/mm3     Comprehensive Metabolic Panel [[id number]]  (Abnormal) Collected: 01/31/24 0409    Specimen: Blood Updated: 01/31/24 0449     Glucose 97 mg/dL      BUN 58 mg/dL      Creatinine 1.86 mg/dL      Sodium 132 mmol/L      Potassium 3.3 mmol/L      Chloride 97 mmol/L      CO2 18.0 mmol/L      Calcium 7.1 mg/dL      Total Protein 4.9 g/dL      Albumin 2.2 g/dL      ALT (SGPT) 27 U/L      AST (SGOT) 23 U/L      Alkaline Phosphatase 143 U/L      Total  Bilirubin 2.5 mg/dL      Globulin 2.7 gm/dL      A/G Ratio 0.8 g/dL      BUN/Creatinine Ratio 31.2     Anion Gap 17.0 mmol/L      eGFR 36.1 mL/min/1.73     Narrative:      GFR Normal >60  Chronic Kidney Disease <60  Kidney Failure <15      STAT Lactic Acid, Reflex [[id number]]  (Abnormal) Collected: 01/31/24 0407    Specimen: Blood Updated: 01/31/24 0448     Lactate 4.2 mmol/L     Protime-INR [[id number]]  (Abnormal) Collected: 01/31/24 0407    Specimen: Blood Updated: 01/31/24 0438     Protime 16.2 Seconds      INR 1.29    aPTT [[id number]]  (Abnormal) Collected: 01/31/24 0407    Specimen: Blood Updated: 01/31/24 0438     PTT 42.9 seconds     CBC Auto Differential [[id number]]  (Abnormal) Collected: 01/30/24 2350    Specimen: Blood Updated: 01/31/24 0101     WBC 27.09 10*3/mm3      RBC 3.34 10*6/mm3      Hemoglobin 9.1 g/dL      Hematocrit 27.5 %      MCV 82.3 fL      MCH 27.2 pg      MCHC 33.1 g/dL      RDW 14.6 %      RDW-SD 42.4 fl      MPV 14.0 fL      Platelets 46 10*3/mm3     Manual Differential [[id number]]  (Abnormal) Collected: 01/30/24 2350    Specimen: Blood Updated: 01/31/24 0101     Neutrophil % 95.0 %      Lymphocyte % 2.0 %      Monocyte % 2.0 %      Bands %  1.0 %      Neutrophils Absolute 26.01 10*3/mm3      Lymphocytes Absolute 0.54 10*3/mm3      Monocytes Absolute 0.54 10*3/mm3      Anisocytosis Slight/1+     Poikilocytes Slight/1+     Toxic Granulation Slight/1+     Vacuolated Neutrophils Slight/1+     Platelet Estimate Decreased    Fibrin Split Products [[id number]]  (Abnormal) Collected: 01/30/24 2350    Specimen: Blood Updated: 01/31/24 0029     Fibrin Split Products Greater than or equal to 5, but less than 20 mcg/mL    Lactic Acid, Plasma [[id number]]  (Abnormal) Collected: 01/30/24 2350    Specimen: Blood Updated: 01/31/24 0021     Lactate 7.1 mmol/L     Comprehensive Metabolic Panel [[id number]]  (Abnormal) Collected: 01/30/24 5476    Specimen: Blood Updated: 01/31/24 0015     Glucose 73 mg/dL       BUN 59 mg/dL      Creatinine 2.35 mg/dL      Sodium 128 mmol/L      Potassium 3.4 mmol/L      Chloride 92 mmol/L      CO2 17.0 mmol/L      Calcium 7.8 mg/dL      Total Protein 5.3 g/dL      Albumin 2.1 g/dL      ALT (SGPT) 32 U/L      AST (SGOT) 26 U/L      Alkaline Phosphatase 163 U/L      Total Bilirubin 2.3 mg/dL      Globulin 3.2 gm/dL      A/G Ratio 0.7 g/dL      BUN/Creatinine Ratio 25.1     Anion Gap 19.0 mmol/L      eGFR 27.2 mL/min/1.73     Narrative:      GFR Normal >60  Chronic Kidney Disease <60  Kidney Failure <15      Magnesium [[id number]]  (Normal) Collected: 01/30/24 2350    Specimen: Blood Updated: 01/31/24 0015     Magnesium 1.7 mg/dL     CK [[id number]]  (Normal) Collected: 01/30/24 2350    Specimen: Blood Updated: 01/31/24 0015     Creatine Kinase 76 U/L     Blood Culture - Blood, Arm, Left [[id number]] Collected: 01/30/24 2356    Specimen: Blood from Arm, Left Updated: 01/30/24 2356    Blood Culture - Blood, Arm, Left [[id number]] Collected: 01/30/24 2350    Specimen: Blood from Arm, Left Updated: 01/30/24 2356    Blood Gas, Arterial - [[id number]]  (Abnormal) Collected: 01/30/24 2346    Specimen: Arterial Blood Updated: 01/30/24 2346     Site Right Radial     Sang's Test Positive     pH, Arterial 7.526 pH units      Comment: 83 Value above reference range        pCO2, Arterial 21.0 mm Hg      Comment: 84 Value below reference range        pO2, Arterial 82.5 mm Hg      Comment: 84 Value below reference range        HCO3, Arterial 17.4 mmol/L      Comment: 84 Value below reference range        Base Excess, Arterial -4.1 mmol/L      Comment: 84 Value below reference range        O2 Saturation, Arterial 97.3 %      Temperature 37.0     Barometric Pressure for Blood Gas 753 mmHg      Modality Room Air     Ventilator Mode NA     Collected by [id number]     Comment: Meter: [id number]     :  [id number]        pCO2, Temperature Corrected 21.0 mm Hg      pH, Temp Corrected 7.526 pH Units       pO2, Temperature Corrected 82.5 mm Hg             Estimated Creatinine Clearance: 42.1 mL/min (A) (by C-G formula based on SCr of 1.86 mg/dL (H)).    Culture Results:    Microbiology Results (last 10 days)       ** No results found for the last 240 hours. **           >>>>>>>> reviewed records from [facility] influenza A and influenza B negative  COVID-19 negative, strep a negative UA with 10-20 WBCs and a few squamous epithelial cells  Urine drug screen positive for methamphetamine and amphetamine, lactic acid was 8.2     Radiology:         Chest x-ray reviewed.  No obvious consolidation.  Concerned about 1 area left upper lobe with possible cavitary appearance.  Some bilateral interstitial changes noted as well.    Imaging Results (Last 72 Hours)       Procedure Component Value Units Date/Time    XR Abdomen KUB [[id number]] Collected: 01/31/24 0703     Updated: 01/31/24 0708    Narrative:      XR ABDOMEN KUB- 1/31/2024 2:17 AM     HISTORY: Diarrhea     COMPARISON: None     FINDINGS:     There is a nonobstructive bowel gas pattern. No gross intraperitoneal  free air is present. No urolithiasis identified. The osseous structures  demonstrate no acute process.       Impression:         1.  No radiographic evidence of acute abdominopelvic process.           This report was signed and finalized on 1/31/2024 7:05 AM by Dr Apolinar Coronado.       XR Chest 1 View [[id number]] Collected: 01/31/24 0656     Updated: 01/31/24 0701    Narrative:      XR CHEST 1 VW- 1/31/2024 2:14 AM     HISTORY: Sepsis       COMPARISON: None     FINDINGS:  Upright frontal radiograph of the chest was obtained     Bilateral interstitial coarsening. Lungs are well expanded. No  consolidation or pleural effusion. No pneumothorax. Heart size is  normal. No acute bony abnormality seen.       Impression:      1.  Bilateral interstitial coarsening which may reflect a mild  interstitial edema. There is no consolidation or pleural effusion.  Lungs  are well expanded.     This report was signed and finalized on 1/31/2024 6:58 AM by Dr Apolinar Coronado.                 HOSPITAL PROBLEM LIST:      Acute renal injury due to sepsis of unknown origin    Chronic hepatitis C without hepatic coma    Methamphetamine use    Hyponatremia    Anemia    Thrombocytopenia      IMPRESSION:  Sepsis with shock requiring norepinephrine.  A threat to life or bodily function.  Primary concern in this injection drug user would be bacteremia (most likely Staphylococcus) possible endocarditis and septic emboli.  Methamphetamine use-injection and snorting  Thrombocytopenia-suspect secondary to sepsis  Acute kidney injury-creatinine improved today      RECOMMENDATION:   Continue vancomycin  Continue Zosyn  Follow-up blood cultures obtained here  Follow blood cultures from [facility]-they were collected at 1730 on January 30 and have to be sent out.  Follow-up on 2D echo  Continue to monitor labs  Would hold off on PICC unless absolutely necessary-suspect patient will be bacteremic  Add HIV antigen antibody to blood in lab  Order MRSA nasal screen         Debby Gastelum MD  01/31/24  12:24 CST

## 2024-01-31 NOTE — CONSULTS
"Pharmacy Dosing Service  Pharmacokinetics  Vancomycin Initial Evaluation  Assessment/Action/Plan:  Loading dose: 1,500 mg  Current Order: Vancomycin 750 mg IVPB every 24 hours  Current end date/final dose: 2/3/24 at 0000  Levels: None ordered at this time  Additional antimicrobial agent(s): Zosyn  MRSA Nasal PCR ordered: No    Vancomycin dosage initiated based on population pharmacokinetic parameters. Pharmacy will continue to follow daily and adjust dose accordingly.     Subjective:  Jaqui Kate is a 33 y.o. female with a Vancomycin \"Pharmacy to Dose\" consult for the treatment of Sepsis , day 1 of treatment.    AUC Model Data:  Loading dose: 1500 mg at 00:00 01/31/2024.  Regimen: 750 mg IV every 24 hours.  Start time: 00:00 on 02/01/2024  Exposure target: AUC24 (range)400-600 mg/L.hr   AUC24,ss: 502 mg/L.hr  PAUC*: 75 %  Ctrough,ss: 16.4 mg/L  Pconc*: 54 %  Tox.: 20 %    Objective:  Ht: 160 cm (63\"); Wt: 78 kg (171 lb 15.3 oz)     Estimated CrCl = 23.7 ml/min (based on SCr = 3.3 mg/dl at [facility] prior to transfer)    Baseline culture results:  Microbiology Results (last 10 days)       ** No results found for the last 240 hours. **            Atilio Harry, PharmD  01/30/24 23:33 CST    "

## 2024-01-31 NOTE — NURSING NOTE
Spoke with BARAK Cannon at [facility] about patients possible inhalation of methamphetamine laced with wasp spray 4 days ago.   Kassandra states the symptoms the patient is currently exhibiting do not align with ingestion of organophosphates, carbamates, muscagenic or nicotinic poisoning that would be found in commercial insecticides and that retail available insecticides would not be toxic to the patient.   Recommended Serum tox screening for APAP, salicylates and alcohol as well as ABG, Chest xray, CMP with Mag and anion gap, CK, CBC. Monitor on Telemetry and perform EKG.  Poison Control will follow.       2230  Poison control called back and said she looked it up and found out that something called “Wasp Dope” started in the [facility] area and is spreading because it is cheap and readily   available. Can be used with or without meth to get high.    Active ingredient would be pyrethrine and can cause:    Headache N/v tremors redness of skin and itching: burning.    She didn’t mention any antidotes or other treatment recommendations.

## 2024-02-01 PROBLEM — B95.62 MRSA BACTEREMIA: Status: ACTIVE | Noted: 2024-02-01

## 2024-02-01 PROBLEM — R78.81 MRSA BACTEREMIA: Status: ACTIVE | Noted: 2024-02-01

## 2024-02-01 LAB
ALBUMIN SERPL-MCNC: 2.1 G/DL (ref 3.5–5.2)
ALBUMIN/GLOB SERPL: 0.7 G/DL
ALP SERPL-CCNC: 201 U/L (ref 39–117)
ALT SERPL W P-5'-P-CCNC: 20 U/L (ref 1–33)
ANION GAP SERPL CALCULATED.3IONS-SCNC: 13 MMOL/L (ref 5–15)
ANISOCYTOSIS BLD QL: ABNORMAL
AST SERPL-CCNC: 26 U/L (ref 1–32)
BILIRUB SERPL-MCNC: 2.3 MG/DL (ref 0–1.2)
BUN SERPL-MCNC: 55 MG/DL (ref 6–20)
BUN/CREAT SERPL: 32.9 (ref 7–25)
CALCIUM SPEC-SCNC: 7.3 MG/DL (ref 8.6–10.5)
CHLORIDE SERPL-SCNC: 103 MMOL/L (ref 98–107)
CO2 SERPL-SCNC: 18 MMOL/L (ref 22–29)
CREAT SERPL-MCNC: 1.67 MG/DL (ref 0.57–1)
DEPRECATED RDW RBC AUTO: 42.5 FL (ref 37–54)
DOHLE BOD BLD QL SMEAR: PRESENT
EGFRCR SERPLBLD CKD-EPI 2021: 41.1 ML/MIN/1.73
ERYTHROCYTE [DISTWIDTH] IN BLOOD BY AUTOMATED COUNT: 14.6 % (ref 12.3–15.4)
GLOBULIN UR ELPH-MCNC: 3.2 GM/DL
GLUCOSE BLDC GLUCOMTR-MCNC: 112 MG/DL (ref 70–130)
GLUCOSE BLDC GLUCOMTR-MCNC: 118 MG/DL (ref 70–130)
GLUCOSE SERPL-MCNC: 118 MG/DL (ref 65–99)
HCT VFR BLD AUTO: 23.4 % (ref 34–46.6)
HGB BLD-MCNC: 7.8 G/DL (ref 12–15.9)
HIV 1+2 AB+HIV1 P24 AG SERPL QL IA: NORMAL
LYMPHOCYTES # BLD MANUAL: 0.72 10*3/MM3 (ref 0.7–3.1)
LYMPHOCYTES NFR BLD MANUAL: 3 % (ref 5–12)
MAGNESIUM SERPL-MCNC: 2.1 MG/DL (ref 1.6–2.6)
MCH RBC QN AUTO: 27.2 PG (ref 26.6–33)
MCHC RBC AUTO-ENTMCNC: 33.3 G/DL (ref 31.5–35.7)
MCV RBC AUTO: 81.5 FL (ref 79–97)
MONOCYTES # BLD: 0.54 10*3/MM3 (ref 0.1–0.9)
NEUTROPHILS # BLD AUTO: 16.63 10*3/MM3 (ref 1.7–7)
NEUTROPHILS NFR BLD MANUAL: 93 % (ref 42.7–76)
NEUTS VAC BLD QL SMEAR: ABNORMAL
PLATELET # BLD AUTO: 36 10*3/MM3 (ref 140–450)
PMV BLD AUTO: 13.1 FL (ref 6–12)
POIKILOCYTOSIS BLD QL SMEAR: ABNORMAL
POTASSIUM SERPL-SCNC: 2.8 MMOL/L (ref 3.5–5.2)
PREALB SERPL-MCNC: <3 MG/DL (ref 20–40)
PROT SERPL-MCNC: 5.3 G/DL (ref 6–8.5)
RBC # BLD AUTO: 2.87 10*6/MM3 (ref 3.77–5.28)
SMALL PLATELETS BLD QL SMEAR: ABNORMAL
SODIUM SERPL-SCNC: 134 MMOL/L (ref 136–145)
TOXIC GRANULATION: ABNORMAL
VANCOMYCIN TROUGH SERPL-MCNC: 12.4 MCG/ML (ref 5–20)
VARIANT LYMPHS NFR BLD MANUAL: 2 % (ref 0–5)
VARIANT LYMPHS NFR BLD MANUAL: 2 % (ref 19.6–45.3)
WBC NRBC COR # BLD AUTO: 17.88 10*3/MM3 (ref 3.4–10.8)

## 2024-02-01 PROCEDURE — 25010000002 VANCOMYCIN 10 G RECONSTITUTED SOLUTION: Performed by: FAMILY MEDICINE

## 2024-02-01 PROCEDURE — 25810000003 SODIUM CHLORIDE 0.9 % SOLUTION: Performed by: FAMILY MEDICINE

## 2024-02-01 PROCEDURE — 84134 ASSAY OF PREALBUMIN: CPT | Performed by: NURSE PRACTITIONER

## 2024-02-01 PROCEDURE — 85007 BL SMEAR W/DIFF WBC COUNT: CPT | Performed by: FAMILY MEDICINE

## 2024-02-01 PROCEDURE — G0432 EIA HIV-1/HIV-2 SCREEN: HCPCS | Performed by: INTERNAL MEDICINE

## 2024-02-01 PROCEDURE — 25810000003 SODIUM CHLORIDE 0.9 % SOLUTION: Performed by: INTERNAL MEDICINE

## 2024-02-01 PROCEDURE — 99233 SBSQ HOSP IP/OBS HIGH 50: CPT | Performed by: INTERNAL MEDICINE

## 2024-02-01 PROCEDURE — 87040 BLOOD CULTURE FOR BACTERIA: CPT | Performed by: INTERNAL MEDICINE

## 2024-02-01 PROCEDURE — 25010000002 ONDANSETRON PER 1 MG: Performed by: INTERNAL MEDICINE

## 2024-02-01 PROCEDURE — 83735 ASSAY OF MAGNESIUM: CPT | Performed by: INTERNAL MEDICINE

## 2024-02-01 PROCEDURE — 99223 1ST HOSP IP/OBS HIGH 75: CPT | Performed by: SURGERY

## 2024-02-01 PROCEDURE — 85025 COMPLETE CBC W/AUTO DIFF WBC: CPT | Performed by: FAMILY MEDICINE

## 2024-02-01 PROCEDURE — 82948 REAGENT STRIP/BLOOD GLUCOSE: CPT

## 2024-02-01 PROCEDURE — 87147 CULTURE TYPE IMMUNOLOGIC: CPT | Performed by: INTERNAL MEDICINE

## 2024-02-01 PROCEDURE — 80053 COMPREHEN METABOLIC PANEL: CPT | Performed by: FAMILY MEDICINE

## 2024-02-01 PROCEDURE — 80202 ASSAY OF VANCOMYCIN: CPT | Performed by: FAMILY MEDICINE

## 2024-02-01 PROCEDURE — 99232 SBSQ HOSP IP/OBS MODERATE 35: CPT | Performed by: INTERNAL MEDICINE

## 2024-02-01 RX ORDER — TRAMADOL HYDROCHLORIDE 50 MG/1
50 TABLET ORAL EVERY 6 HOURS PRN
Status: DISCONTINUED | OUTPATIENT
Start: 2024-02-01 | End: 2024-02-04 | Stop reason: HOSPADM

## 2024-02-01 RX ORDER — POTASSIUM CHLORIDE 750 MG/1
20 CAPSULE, EXTENDED RELEASE ORAL ONCE
Status: COMPLETED | OUTPATIENT
Start: 2024-02-01 | End: 2024-02-01

## 2024-02-01 RX ORDER — SACCHAROMYCES BOULARDII 250 MG
500 CAPSULE ORAL 2 TIMES DAILY
Status: DISCONTINUED | OUTPATIENT
Start: 2024-02-01 | End: 2024-02-04 | Stop reason: HOSPADM

## 2024-02-01 RX ORDER — ACETAMINOPHEN 325 MG/1
975 TABLET ORAL EVERY 6 HOURS PRN
Status: DISCONTINUED | OUTPATIENT
Start: 2024-02-01 | End: 2024-02-04 | Stop reason: HOSPADM

## 2024-02-01 RX ADMIN — Medication 500 MG: at 11:52

## 2024-02-01 RX ADMIN — ACETAMINOPHEN 975 MG: 325 TABLET, FILM COATED ORAL at 21:10

## 2024-02-01 RX ADMIN — POTASSIUM CHLORIDE 20 MEQ: 10 CAPSULE, COATED, EXTENDED RELEASE ORAL at 05:54

## 2024-02-01 RX ADMIN — Medication 10 ML: at 20:27

## 2024-02-01 RX ADMIN — SODIUM CHLORIDE 125 ML/HR: 9 INJECTION, SOLUTION INTRAVENOUS at 10:09

## 2024-02-01 RX ADMIN — Medication 1 APPLICATION: at 20:27

## 2024-02-01 RX ADMIN — SODIUM CHLORIDE 125 ML/HR: 9 INJECTION, SOLUTION INTRAVENOUS at 17:25

## 2024-02-01 RX ADMIN — NOREPINEPHRINE BITARTRATE 0.04 MCG/KG/MIN: 0.03 INJECTION, SOLUTION INTRAVENOUS at 21:09

## 2024-02-01 RX ADMIN — Medication 500 MG: at 20:27

## 2024-02-01 RX ADMIN — Medication 10 ML: at 08:45

## 2024-02-01 RX ADMIN — TRAMADOL HYDROCHLORIDE 50 MG: 50 TABLET, COATED ORAL at 11:46

## 2024-02-01 RX ADMIN — CHLORHEXIDINE GLUCONATE 1 APPLICATION: 500 CLOTH TOPICAL at 04:28

## 2024-02-01 RX ADMIN — SODIUM CHLORIDE 125 ML/HR: 9 INJECTION, SOLUTION INTRAVENOUS at 02:32

## 2024-02-01 RX ADMIN — ONDANSETRON 4 MG: 2 INJECTION INTRAMUSCULAR; INTRAVENOUS at 09:35

## 2024-02-01 RX ADMIN — Medication 1 APPLICATION: at 08:44

## 2024-02-01 RX ADMIN — VANCOMYCIN HYDROCHLORIDE 1250 MG: 10 INJECTION, POWDER, LYOPHILIZED, FOR SOLUTION INTRAVENOUS at 16:33

## 2024-02-01 RX ADMIN — ACETAMINOPHEN 975 MG: 325 TABLET, FILM COATED ORAL at 08:43

## 2024-02-01 NOTE — NURSING NOTE
Tepid bath given to reduce fever per Dr. Gastelum. Patient refused to let me bathe her chest or stomach.

## 2024-02-01 NOTE — PLAN OF CARE
Goal Outcome Evaluation:    Pt A&Ox4. Max temp 102.8 axillary. Most current temp was 99.3. Discussed with ID, and 975 mg of tylenol administered. BM x3. Adequate UOP. Levo currently infusing at 0.04. Most recent lactate was 2.3. Platelets 36, discussed with MD, no new orders. Potassium this morning was 2.8. Replaced with 20 mEq. Safety maintained.

## 2024-02-01 NOTE — PLAN OF CARE
Goal Outcome Evaluation:  Plan of Care Reviewed With: patient        Progress: improving       Problem: Skin Injury Risk Increased  Goal: Skin Health and Integrity  1/31/2024 1825 by Delma Patrick RN  Outcome: Ongoing, Progressing  1/31/2024 1825 by Delma Patrick RN  Outcome: Ongoing, Progressing  1/31/2024 1824 by Delma Patrick RN  Outcome: Ongoing, Progressing  Intervention: Optimize Skin Protection  Recent Flowsheet Documentation  Taken 1/31/2024 1600 by Delma Patrick RN  Pressure Reduction Techniques:   weight shift assistance provided   heels elevated off bed  Pressure Reduction Devices: pressure-redistributing mattress utilized  Taken 1/31/2024 1200 by Delma Patrick RN  Pressure Reduction Techniques:   heels elevated off bed   weight shift assistance provided  Head of Bed (HOB) Positioning: HOB at 30 degrees  Pressure Reduction Devices: pressure-redistributing mattress utilized  Skin Protection:   incontinence pads utilized   tubing/devices free from skin contact  Taken 1/31/2024 1000 by Delma Patrick RN  Head of Bed (HOB) Positioning: HOB at 30 degrees  Taken 1/31/2024 0800 by Delma Patrick RN  Pressure Reduction Techniques:   heels elevated off bed   weight shift assistance provided  Pressure Reduction Devices: pressure-redistributing mattress utilized  Skin Protection:   incontinence pads utilized   tubing/devices free from skin contact     Problem: Adult Inpatient Plan of Care  Goal: Plan of Care Review  1/31/2024 1825 by Delma Patrick RN  Outcome: Ongoing, Progressing  1/31/2024 1825 by Delma Patrick RN  Outcome: Ongoing, Progressing  Flowsheets (Taken 1/31/2024 1825)  Progress: improving  Plan of Care Reviewed With: patient  1/31/2024 1824 by Delma Patrick RN  Outcome: Ongoing, Progressing  Flowsheets (Taken 1/31/2024 1824)  Progress: no change  Plan of Care Reviewed With: patient  Goal: Patient-Specific Goal (Individualized)  1/31/2024 1825 by Delma Patrick RN  Outcome: Ongoing,  Progressing  1/31/2024 1825 by Delma Patrick RN  Outcome: Ongoing, Progressing  1/31/2024 1824 by Delma Patrick RN  Outcome: Ongoing, Progressing  Goal: Absence of Hospital-Acquired Illness or Injury  1/31/2024 1825 by Delma Patrick RN  Outcome: Ongoing, Progressing  1/31/2024 1825 by Delma Patrick RN  Outcome: Ongoing, Progressing  1/31/2024 1824 by Delma Patrick RN  Outcome: Ongoing, Progressing  Intervention: Identify and Manage Fall Risk  Recent Flowsheet Documentation  Taken 1/31/2024 1800 by Delma Patrick RN  Safety Promotion/Fall Prevention: safety round/check completed  Taken 1/31/2024 1700 by Delma Patrick RN  Safety Promotion/Fall Prevention: safety round/check completed  Taken 1/31/2024 1600 by Delma Patrick RN  Safety Promotion/Fall Prevention: safety round/check completed  Taken 1/31/2024 1500 by Delma Patrick RN  Safety Promotion/Fall Prevention: safety round/check completed  Taken 1/31/2024 1300 by Delma Patrick RN  Safety Promotion/Fall Prevention: safety round/check completed  Taken 1/31/2024 1200 by Delma Patrick RN  Safety Promotion/Fall Prevention: safety round/check completed  Taken 1/31/2024 1115 by Delma Patrick RN  Safety Promotion/Fall Prevention: safety round/check completed  Taken 1/31/2024 1000 by Delma Patrick RN  Safety Promotion/Fall Prevention: safety round/check completed  Taken 1/31/2024 0900 by Delma Patrick RN  Safety Promotion/Fall Prevention: safety round/check completed  Taken 1/31/2024 0800 by Delma Patrick RN  Safety Promotion/Fall Prevention: safety round/check completed  Taken 1/31/2024 0700 by Delma Patrick RN  Safety Promotion/Fall Prevention: safety round/check completed  Intervention: Prevent Skin Injury  Recent Flowsheet Documentation  Taken 1/31/2024 1600 by Delma Patrick RN  Body Position: position changed independently  Taken 1/31/2024 1200 by Delma Patrick RN  Body Position:   turned   left   lower extremity elevated   upper  extremity elevated  Skin Protection:   incontinence pads utilized   tubing/devices free from skin contact  Taken 1/31/2024 1000 by Delma Patrick RN  Body Position:   turned   supine   lower extremity elevated   upper extremity elevated  Taken 1/31/2024 0800 by Delma Patrick RN  Body Position: position changed independently  Skin Protection:   incontinence pads utilized   tubing/devices free from skin contact  Intervention: Prevent and Manage VTE (Venous Thromboembolism) Risk  Recent Flowsheet Documentation  Taken 1/31/2024 1600 by Delma Patrick RN  VTE Prevention/Management:   bilateral   sequential compression devices off  Taken 1/31/2024 1200 by Delma Patrick RN  VTE Prevention/Management:   bilateral   sequential compression devices on  Taken 1/31/2024 0800 by Delma Patrick RN  Activity Management: bedrest  VTE Prevention/Management:   bilateral   sequential compression devices on  Goal: Optimal Comfort and Wellbeing  1/31/2024 1825 by Delma Patrick RN  Outcome: Ongoing, Progressing  1/31/2024 1825 by Delma Patrick RN  Outcome: Ongoing, Progressing  1/31/2024 1824 by Delma Patrick RN  Outcome: Ongoing, Progressing  Goal: Readiness for Transition of Care  1/31/2024 1825 by Delma Patrick RN  Outcome: Ongoing, Progressing  1/31/2024 1825 by Delma Patrick RN  Outcome: Ongoing, Progressing  1/31/2024 1824 by Delma Patrick RN  Outcome: Ongoing, Progressing

## 2024-02-01 NOTE — PLAN OF CARE
Problem: Skin Injury Risk Increased  Goal: Skin Health and Integrity  Outcome: Ongoing, Progressing  Intervention: Optimize Skin Protection  Recent Flowsheet Documentation  Taken 2/1/2024 1600 by Diana Leiva RN  Pressure Reduction Techniques: weight shift assistance provided  Pressure Reduction Devices: pressure-redistributing mattress utilized  Taken 2/1/2024 1200 by Diana Leiva RN  Pressure Reduction Techniques: weight shift assistance provided  Pressure Reduction Devices: pressure-redistributing mattress utilized     Problem: Adult Inpatient Plan of Care  Goal: Plan of Care Review  Outcome: Ongoing, Progressing  Goal: Patient-Specific Goal (Individualized)  Outcome: Ongoing, Progressing  Goal: Absence of Hospital-Acquired Illness or Injury  Outcome: Ongoing, Progressing  Intervention: Identify and Manage Fall Risk  Recent Flowsheet Documentation  Taken 2/1/2024 1600 by Diana Leiva RN  Safety Promotion/Fall Prevention: safety round/check completed  Taken 2/1/2024 1500 by Diana Leiva RN  Safety Promotion/Fall Prevention: safety round/check completed  Taken 2/1/2024 1400 by Diana Leiva RN  Safety Promotion/Fall Prevention: safety round/check completed  Taken 2/1/2024 1300 by Diana Leiva RN  Safety Promotion/Fall Prevention: safety round/check completed  Taken 2/1/2024 1200 by Diana Leiva RN  Safety Promotion/Fall Prevention: safety round/check completed  Taken 2/1/2024 1100 by Diana Leiva RN  Safety Promotion/Fall Prevention: safety round/check completed  Taken 2/1/2024 1000 by Diana Leiva RN  Safety Promotion/Fall Prevention: safety round/check completed  Taken 2/1/2024 0900 by Diana Leiva RN  Safety Promotion/Fall Prevention: safety round/check completed  Taken 2/1/2024 0800 by Diana Leiva RN  Safety Promotion/Fall Prevention: safety round/check completed  Taken 2/1/2024 0700 by Diana Leiva RN  Safety Promotion/Fall Prevention: safety round/check completed  Intervention:  Prevent Skin Injury  Recent Flowsheet Documentation  Taken 2/1/2024 1600 by Diana Leiva RN  Body Position: position changed independently  Taken 2/1/2024 1300 by Diana Leiva RN  Body Position: position changed independently  Taken 2/1/2024 1200 by Diana Leiva RN  Body Position: position changed independently  Taken 2/1/2024 1000 by Diana Leiva RN  Body Position: position changed independently  Taken 2/1/2024 0800 by Diana Leiva RN  Body Position: position changed independently  Intervention: Prevent and Manage VTE (Venous Thromboembolism) Risk  Recent Flowsheet Documentation  Taken 2/1/2024 1600 by Diana Leiva RN  Activity Management: bedrest  VTE Prevention/Management: patient refused intervention  Range of Motion: active ROM (range of motion) encouraged  Taken 2/1/2024 1200 by Diana Leiva RN  Activity Management: bedrest  VTE Prevention/Management: patient refused intervention  Range of Motion: active ROM (range of motion) encouraged  Taken 2/1/2024 1100 by Diana Leiva RN  Activity Management: bedrest  Taken 2/1/2024 0800 by Diana Leiva RN  Activity Management: back to bed  VTE Prevention/Management: patient refused intervention  Taken 2/1/2024 0700 by Diana Leiva RN  Activity Management: back to bed  Goal: Optimal Comfort and Wellbeing  Outcome: Ongoing, Progressing  Goal: Readiness for Transition of Care  Outcome: Ongoing, Progressing     Problem: Adult Inpatient Plan of Care  Goal: Absence of Hospital-Acquired Illness or Injury  Intervention: Prevent Skin Injury  Recent Flowsheet Documentation  Taken 2/1/2024 1600 by Diana Leiva RN  Body Position: position changed independently  Taken 2/1/2024 1300 by Diana Leiva RN  Body Position: position changed independently  Taken 2/1/2024 1200 by Diana Leiva RN  Body Position: position changed independently  Taken 2/1/2024 1000 by Diana Leiva RN  Body Position: position changed independently  Taken 2/1/2024 0800 by Diana Leiva  RN  Body Position: position changed independently     Problem: Adult Inpatient Plan of Care  Goal: Absence of Hospital-Acquired Illness or Injury  Intervention: Prevent and Manage VTE (Venous Thromboembolism) Risk  Recent Flowsheet Documentation  Taken 2/1/2024 1600 by Diana Leiva RN  Activity Management: bedrest  VTE Prevention/Management: patient refused intervention  Range of Motion: active ROM (range of motion) encouraged  Taken 2/1/2024 1200 by Diana Leiva RN  Activity Management: bedrest  VTE Prevention/Management: patient refused intervention  Range of Motion: active ROM (range of motion) encouraged  Taken 2/1/2024 1100 by Diana Leiva RN  Activity Management: bedrest  Taken 2/1/2024 0800 by Diana Leiva RN  Activity Management: back to bed  VTE Prevention/Management: patient refused intervention  Taken 2/1/2024 0700 by Diana Leiva RN  Activity Management: back to bed     Problem: Adult Inpatient Plan of Care  Goal: Optimal Comfort and Wellbeing  Outcome: Ongoing, Progressing     Problem: Adjustment to Illness (Sepsis/Septic Shock)  Goal: Optimal Coping  Outcome: Ongoing, Progressing     Problem: Bleeding (Sepsis/Septic Shock)  Goal: Absence of Bleeding  Outcome: Ongoing, Progressing     Problem: Glycemic Control Impaired (Sepsis/Septic Shock)  Goal: Blood Glucose Level Within Desired Range  Outcome: Ongoing, Progressing  Intervention: Optimize Glycemic Control  Recent Flowsheet Documentation  Taken 2/1/2024 0800 by Diana Leiva RN  Glycemic Management: blood glucose monitored     Problem: Infection Progression (Sepsis/Septic Shock)  Goal: Absence of Infection Signs and Symptoms  Outcome: Ongoing, Progressing  Intervention: Promote Recovery  Recent Flowsheet Documentation  Taken 2/1/2024 1600 by Diana Leiva RN  Activity Management: bedrest  Taken 2/1/2024 1200 by Diana Leiva RN  Activity Management: bedrest  Taken 2/1/2024 1100 by Diana Leiva RN  Activity Management: bedrest  Taken  2/1/2024 0800 by Diana Leiva, RN  Activity Management: back to bed  Taken 2/1/2024 0700 by Diana Leiva, RN  Activity Management: back to bed     Problem: Nutrition Impaired (Sepsis/Septic Shock)  Goal: Optimal Nutrition Intake  Outcome: Ongoing, Progressing   Goal Outcome Evaluation:   Ultram given once. Probiotic initiated for diarrhea. Levo at 0.02. BM x 5 loose and watery. Highest temp was 103.2 ax. Frantz-Beltran aware, 975 mg of Tylenol was given, and a tepid bath. Temp was 98.3 ax last check.

## 2024-02-01 NOTE — PROGRESS NOTES
[facility]  INPATIENT PROGRESS NOTE    Patient Name: Jaqui Kate  Date of Admission: 1/30/2024  Today's Date: 02/01/24  Length of Stay: 2  Primary Care Physician: Vanesa Guevara APRN    Subjective   Chief Complaint: Mild to moderate chest discomfort  HPI     The patient remains febrile.  Current temperature is 99.9.  The patient has experienced temperatures above 103 over the past 24 hours.  CT surgery has seen and evaluated the patient.  Dr. Thakkar states that the patient will need a SHANNON prior to surgical intervention and if cultures do not clear, valvectomy can be considered.  She remains on IV vancomycin with blood culture positive MRSA noted.  Sensitivities are pending.  Patient remains on Levophed and complains of some midsternal chest discomfort.  I am reluctant to provide potent narcotic therapy because of hypotension requiring pressors.  She will be given tramadol for discomfort.  Renal function significantly improved today with IV fluids with creatinine 1.67, sodium 134.  Potassium is low at 2.8 and will be replaced per protocol.  Albumin low at 2.1.  HIV 1/2 and reactive.  CBC shows an improved white count to 17,900 with hemoglobin 7.8.  Platelet count remains low at 36,000.  There is no evidence of active bleeding.  Repeat blood culture with JEAN has been ordered by Dr. Gastelum.    Review of Systems   All pertinent negatives and positives are as above. All other systems have been reviewed and are negative unless otherwise stated.     Objective    Temp:  [99.1 °F (37.3 °C)-103.2 °F (39.6 °C)] 102.1 °F (38.9 °C)  Heart Rate:  [] 130  Resp:  [36-40] 36  BP: ()/(39-81) 112/81  Physical Exam  Constitutional:       General: She is sleeping.      Appearance: She is normal weight. She is ill-appearing.   HENT:      Head: Normocephalic and atraumatic.      Right Ear: External ear normal.      Left Ear: External ear normal.      Nose: Nose  normal.      Mouth: Mucous membranes are moist.      Pharynx: Oropharynx is clear.   Eyes:      General: No scleral icterus.     Conjunctiva/sclera: Conjunctivae normal.   Cardiovascular:      Rate and Rhythm: Regular rhythm. Tachycardia present.      Pulses: Normal pulses.      Heart sounds: Murmur heard.      Systolic murmur is present with a grade of 2/6.   Pulmonary:      Effort: Pulmonary effort is normal. No respiratory distress.      Breath sounds: Normal breath sounds.   Abdominal:      General: Abdomen is flat. Bowel sounds are normal.      Palpations: Abdomen is soft. There is no mass.      Tenderness: There is no abdominal tenderness.   Musculoskeletal:         General: Normal range of motion.      Right lower leg: No edema.      Left lower leg: No edema.   Skin:     General: Skin is warm and dry.      Coloration: Skin is not pale.      Findings: Erythema (feet) present.   Neurological:      General: No focal deficit present.      Mental Status: She is oriented to person, place, and time and easily aroused. Mental status is at baseline.   Psychiatric:         Mood and Affect: Mood normal.         Judgment: Judgment normal.     Results Review:  I have reviewed the labs, radiology results, and diagnostic studies.    Laboratory Data:   Results from last 7 days   Lab Units 02/01/24  0432 01/31/24  0409 01/30/24  2350   WBC 10*3/mm3 17.88* 21.12* 27.09*   HEMOGLOBIN g/dL 7.8* 7.7* 9.1*   HEMATOCRIT % 23.4* 22.8* 27.5*   PLATELETS 10*3/mm3 36* 41* 46*        Results from last 7 days   Lab Units 02/01/24  0432 01/31/24  0409 01/30/24  2350   SODIUM mmol/L 134* 132* 128*   POTASSIUM mmol/L 2.8* 3.3* 3.4*   CHLORIDE mmol/L 103 97* 92*   CO2 mmol/L 18.0* 18.0* 17.0*   BUN mg/dL 55* 58* 59*   CREATININE mg/dL 1.67* 1.86* 2.35*   CALCIUM mg/dL 7.3* 7.1* 7.8*   BILIRUBIN mg/dL 2.3* 2.5* 2.3*   ALK PHOS U/L 201* 143* 163*   ALT (SGPT) U/L 20 27 32   AST (SGOT) U/L 26 23 26   GLUCOSE mg/dL 118* 97 73       Culture Data:    Blood Culture   Date Value Ref Range Status   01/30/2024 Staphylococcus aureus, MRSA (C)  Preliminary     Comment:       Infectious disease consultation is highly recommended to rule out distant foci of infection.  Methicillin resistant Staphylococcus aureus, Patient may be an isolation risk.   01/30/2024 Staphylococcus aureus, MRSA (C)  Preliminary     Comment:       Infectious disease consultation is highly recommended to rule out distant foci of infection.  Methicillin resistant Staphylococcus aureus, Patient may be an isolation risk.       Radiology Data:   Imaging Results (Last 24 Hours)       ** No results found for the last 24 hours. **            I have reviewed the patient's current medications.     Assessment/Plan   Assessment  Active Hospital Problems    Diagnosis     **Sepsis associated hypotension     MRSA bacteremia     Methamphetamine use     Hyponatremia     Anemia     Thrombocytopenia     Tricuspid valve vegetation     Bacterial endocarditis     DARRYN (acute kidney injury)     Chronic hepatitis C without hepatic coma        Treatment Plan  Continue daily CBC and CMP  Continue IV vancomycin per infectious diseases  Tramadol 50 mg p.o. every 6 hours as needed pain  Continue IV fluids  Continue pressors as needed    Medical Decision Making  Number and Complexity of problems:   Sepsis with hypotension, acute, high complexity  Bacterial endocarditis with tricuspid vegetation and bacteremia, acute, high complexity, life-threatening  Hepatitis C, chronic, high complexity  Acute kidney injury, acute, moderate complexity  Thrombocytopenia, acute, moderate complexity  Hyponatremia, acute, moderate complexity  Anemia, chronic, moderate complexity  Intravenous methamphetamine abuse, chronic, high complexity     Differential Diagnosis:   None others considered at present     Conditions and Status        Condition is unchanged.     [facility] Data  External documents reviewed: Care Everywhere documentation  Cardiac  tracing (EKG, telemetry) interpretation: See HPI  Radiology interpretation: See HPI  Labs reviewed: See HPI  Any tests that were considered but not ordered: None     Decision rules/scores evaluated (example [id number], Wells, etc): None     Discussed with: The patient, cardiology and infectious diseases     Care Planning  Shared decision making: All services consulted  Code status and discussions: Full code     Disposition  Social Determinants of Health that impact treatment or disposition: IV drug abuse  I expect the patient to be discharged to ? in ? days.    Electronically signed by Kevin Ramirez DO, 02/01/24, 11:37 CST.    I spent 45 minutes providing critical care management to this patient. This excludes time spent in performing separately billed procedures.

## 2024-02-01 NOTE — PROGRESS NOTES
LOS: 2 days   Patient Care Team:  Vanesa Guevara APRN as PCP - General (Family Medicine)    Chief Complaint: Cardiology consulted for tricuspid valve endocarditis     Rosalee Kate is a 33 y.o. female who is being seen in follow-up  Overall patient continues to feel weak and tired  Has some shortness of breath  Has mild bipedal edema  No chest pain  No palpitation  No presyncope  No syncope  Continues to be on cardiac monitor with sinus tachycardia noted  Hemodynamically stable  No family member in the room  Multiple nurses in the room providing nursing care  Patient does not seem to have any other new questions or complaints  Latest test results as below with leukocytosis, anemia, thrombocytopenia with elevated bilirubin and creatinine  Leukocytosis has improved  Thrombocytopenia has worsened    Telemetry: no malignant arrhythmia. No significant pauses.  Sinus tachycardia noted    Review of Systems   Constitutional: No chills   Has fatigue   No fever.   HENT: Negative.    Eyes: Negative.    Respiratory: Negative for cough,   No chest wall soreness,   Shortness of breath,   no wheezing, no stridor.    Cardiovascular: As above  Gastrointestinal: Negative for abdominal distention,  No abdominal pain,   No blood in stool,   No constipation,   No diarrhea,   No nausea   No vomiting.   Endocrine: Negative.    Genitourinary: Negative for difficulty urinating, dysuria, flank pain and hematuria.   Musculoskeletal: Negative.    Skin: Negative for rash and wound.   Allergic/Immunologic: Negative.    Neurological: Negative for dizziness, syncope, weakness,   No light-headedness  No  headaches.   Hematological: Does not bruise/bleed easily.   Psychiatric/Behavioral: Negative for agitation or behavioral problems,   No confusion,   the patient is  nervous/anxious.       History:   Past Medical History:   Diagnosis Date    Anxiety     Depressed     GERD (gastroesophageal reflux disease)     Hepatitis C       Past Surgical History:   Procedure Laterality Date     SECTION      x 2    TONSILLECTOMY       Social History     Socioeconomic History    Marital status: Single   Tobacco Use    Smoking status: Every Day     Packs/day: .25     Types: Cigarettes    Smokeless tobacco: Never   Vaping Use    Vaping Use: Some days    Substances: Nicotine, Flavoring    Devices: Disposable, Pre-filled pod   Substance and Sexual Activity    Alcohol use: No    Drug use: Yes     Types: Methamphetamines    Sexual activity: Yes     Family History   Problem Relation Age of Onset    Colon cancer Neg Hx     Colon polyps Neg Hx        Labs:  WBC WBC   Date Value Ref Range Status   2024 17.88 (H) 3.40 - 10.80 10*3/mm3 Final   2024 21.12 (H) 3.40 - 10.80 10*3/mm3 Final   2024 27.09 (H) 3.40 - 10.80 10*3/mm3 Final      HGB Hemoglobin   Date Value Ref Range Status   2024 7.8 (L) 12.0 - 15.9 g/dL Final   2024 7.7 (L) 12.0 - 15.9 g/dL Final   2024 9.1 (L) 12.0 - 15.9 g/dL Final      HCT Hematocrit   Date Value Ref Range Status   2024 23.4 (L) 34.0 - 46.6 % Final   2024 22.8 (L) 34.0 - 46.6 % Final   2024 27.5 (L) 34.0 - 46.6 % Final      Platelets Platelets   Date Value Ref Range Status   2024 36 (C) 140 - 450 10*3/mm3 Final   2024 41 (C) 140 - 450 10*3/mm3 Final   2024 46 (C) 140 - 450 10*3/mm3 Final      MCV MCV   Date Value Ref Range Status   2024 81.5 79.0 - 97.0 fL Final   2024 80.9 79.0 - 97.0 fL Final   2024 82.3 79.0 - 97.0 fL Final        Results from last 7 days   Lab Units 24  0432 24  0409 24  2350   SODIUM mmol/L 134* 132* 128*   POTASSIUM mmol/L 2.8* 3.3* 3.4*   CHLORIDE mmol/L 103 97* 92*   CO2 mmol/L 18.0* 18.0* 17.0*   BUN mg/dL 55* 58* 59*   CREATININE mg/dL 1.67* 1.86* 2.35*   CALCIUM mg/dL 7.3* 7.1* 7.8*   BILIRUBIN mg/dL 2.3* 2.5* 2.3*   ALK PHOS U/L 201* 143* 163*   ALT (SGPT) U/L 20 27 32   AST (SGOT) U/L 26 23  26   GLUCOSE mg/dL 118* 97 73     Lab Results   Component Value Date    CKTOTAL 76 01/30/2024     PT/INR:    Protime   Date Value Ref Range Status   01/31/2024 16.2 (H) 11.8 - 14.8 Seconds Final   /  INR   Date Value Ref Range Status   01/31/2024 1.29 (H) 0.91 - 1.09 Final       Imaging Results (Last 72 Hours)       Procedure Component Value Units Date/Time    XR Abdomen KUB [[id number]] Collected: 01/31/24 0703     Updated: 01/31/24 0708    Narrative:      XR ABDOMEN KUB- 1/31/2024 2:17 AM     HISTORY: Diarrhea     COMPARISON: None     FINDINGS:     There is a nonobstructive bowel gas pattern. No gross intraperitoneal  free air is present. No urolithiasis identified. The osseous structures  demonstrate no acute process.       Impression:         1.  No radiographic evidence of acute abdominopelvic process.           This report was signed and finalized on 1/31/2024 7:05 AM by Dr Apolinar Coronado.       XR Chest 1 View [[id number]] Collected: 01/31/24 0656     Updated: 01/31/24 0701    Narrative:      XR CHEST 1 VW- 1/31/2024 2:14 AM     HISTORY: Sepsis       COMPARISON: None     FINDINGS:  Upright frontal radiograph of the chest was obtained     Bilateral interstitial coarsening. Lungs are well expanded. No  consolidation or pleural effusion. No pneumothorax. Heart size is  normal. No acute bony abnormality seen.       Impression:      1.  Bilateral interstitial coarsening which may reflect a mild  interstitial edema. There is no consolidation or pleural effusion. Lungs  are well expanded.     This report was signed and finalized on 1/31/2024 6:58 AM by Dr Apolinar Coronado.               Objective     No Known Allergies    Medication Review: Performed  Current Facility-Administered Medications   Medication Dose Route Frequency Provider Last Rate Last Admin    acetaminophen (TYLENOL) tablet 650 mg  650 mg Oral Q4H PRN Mar Valentino,    650 mg at 01/31/24 1124    Or    acetaminophen (TYLENOL) suppository 325 mg  325  mg Rectal Q4H PRN Mar Valentino DO        acetaminophen (TYLENOL) tablet 975 mg  975 mg Oral Q6H PRN Debby Gastelum MD   975 mg at 02/01/24 0843    Calcium Replacement - Follow Nurse / BPA Driven Protocol   Does not apply PRN Kevin Ramirez DO        Chlorhexidine Gluconate Cloth 2 % pads 1 Application  1 Application Topical Q24H Mar Valentino DO   1 Application at 02/01/24 0428    diphenhydrAMINE (BENADRYL) injection 25 mg  25 mg Intravenous Q6H PRN Mar Valentino DO        Magnesium Standard Dose Replacement - Follow Nurse / BPA Driven Protocol   Does not apply PRN Kevin Ramirez DO        mupirocin (BACTROBAN) 2 % nasal ointment 1 Application  1 Application Each Nare BID Mar Valentino DO   1 Application at 02/01/24 0844    nitroglycerin (NITROSTAT) SL tablet 0.4 mg  0.4 mg Sublingual Q5 Min PRN Mar Valentino DO        norepinephrine (LEVOPHED) 8 mg in 250 mL NS infusion (premix)  0.02-0.3 mcg/kg/min Intravenous Titrated Mar Valentino DO 5.85 mL/hr at 01/31/24 1534 0.04 mcg/kg/min at 01/31/24 1534    ondansetron (ZOFRAN) injection 4 mg  4 mg Intravenous Q6H PRN Mar Valentino DO        Phosphorus Replacement - Follow Nurse / BPA Driven Protocol   Does not apply PRN Kevin Ramirez DO        Potassium Replacement - Follow Nurse / BPA Driven Protocol   Does not apply PRN Kevin Ramirez DO        sodium chloride 0.9 % flush 10 mL  10 mL Intravenous Q12H Mar Valentino DO   10 mL at 02/01/24 0845    sodium chloride 0.9 % flush 10 mL  10 mL Intravenous PRN Mar Valentino DO        sodium chloride 0.9 % infusion 40 mL  40 mL Intravenous PRN Mar Valentino DO        sodium chloride 0.9 % infusion  125 mL/hr Intravenous Continuous Mar Valentino  mL/hr at 02/01/24 0232 125 mL/hr at 02/01/24 0232    vancomycin 1250 mg/250 mL 0.9% NS IVPB ([facility])  1,250 mg Intravenous Q24H Kevin Ramirez DO   1,250 mg at 01/31/24 1734  "      Vital Sign Min/Max for last 24 hours  Temp  Min: 99.1 °F (37.3 °C)  Max: 102.8 °F (39.3 °C)   BP  Min: 76/39  Max: 124/57   Pulse  Min: 82  Max: 152   Resp  Min: 36  Max: 40   SpO2  Min: 91 %  Max: 100 %   No data recorded   Weight  Min: 77.6 kg (171 lb)  Max: 77.6 kg (171 lb)     Flowsheet Rows      Flowsheet Row First Filed Value   Admission Height 160 cm (63\") Documented at 01/29/2024 2150   Admission Weight 78 kg (171 lb 15.3 oz) Documented at 01/29/2024 2150            Results for orders placed during the hospital encounter of 01/30/24    Adult Transthoracic Echo Complete W/ Cont if Necessary Per Protocol    Interpretation Summary    Left ventricular systolic function is normal. Left ventricular ejection fraction appears to be 61 - 65%.    Left ventricular diastolic function was normal.    Estimated right ventricular systolic pressure from tricuspid regurgitation is normal (<35 mmHg).    There is a (20 mm) mass on the tricuspid valve that is consistent with a vegetation.      Physical Exam:    General Appearance: Awake, alert, in no acute distress  Eyes: Pupils equal and reactive    Ears: Appear intact with no abnormalities noted  Nose: Nares normal, no drainage  Neck: supple, trachea midline, no carotid bruit and no JVD  Back: no kyphosis present,    Lungs: respirations regular, respirations even and respirations unlabored  Heart: normal S1, S2, no significant murmurs   No gallops or rubs  no rub and no click  Abdomen: normal bowel sounds, no tenderness   Skin: no bleeding, bruising or rash  Extremities: no cyanosis  Psychiatric/Behavioral: Negative for agitation, behavioral problems, confusion, the patient does  appear to be nervous/anxious.       Results Review:   I reviewed the patient's new clinical results.  I reviewed the patient's new imaging results and agree with the interpretation.  I reviewed the patient's other test results and agree with the interpretation  I personally viewed and " interpreted the patient's EKG/Telemetry data    Discussed with patient  Updated patient regarding any new or relevant abnormalities on review of records or any new findings on physical exam.   Mentioned to patient about purpose of visit and desirable health short and long term goals and objectives.     Reviewed available prior notes, consults, prior visits, laboratory findings, radiology and cardiology relevant reports.   Updated chart as applicable.   I have reviewed the patient's medical history in detail and updated the computerized patient record as relevant.          Assessment & Plan       Sepsis associated hypotension    Chronic hepatitis C without hepatic coma    Methamphetamine use    Hyponatremia    Anemia    Thrombocytopenia    Tricuspid valve vegetation    Bacterial endocarditis    DARRYN (acute kidney injury)      Plan    Care plan discussed with patient  Also discussed with Dr. Becca Beltran  Initial plans for antibiotic therapy  May require transesophageal echocardiogram at some point  Given thrombocytopenia this has to be done with utmost caution and only if absolutely necessary  Currently has some swelling and erythema of the oral cavity that is being addressed by Dr. Becca Beltran  CT surgery will see the patient    Telemetry  Deep vein thrombosis prophylaxis/precautions  Appropriate diet, fluid, sodium, caffeine, stimulants intake   Questions were encouraged, asked and answered to the patient's  understanding and satisfaction.  Compliance to diet and medications       Ham Beal MD  02/01/24  09:18 CST    EMR Dragon/Transcription was used to dictate part of this note

## 2024-02-01 NOTE — PROGRESS NOTES
"INFECTIOUS DISEASES PROGRESS NOTE    Patient:  Jaqui Kate  YOB: 1990  MRN: [id number]   Admit date: 2024   Admitting Physician: Kevin Ramirez DO  Primary Care Physician: Vanesa Guevara APRN    Chief Complaint: Breathing        Interval History: Patient did had temps over 103 overnight.  Patient was given Tylenol.  Temp up to 102 currently.  Blood cultures yesterday identified as MRSA  2D echo with tricuspid valve vegetation.    Allergies: No Known Allergies    Current Scheduled Medications:   Chlorhexidine Gluconate Cloth, 1 Application, Topical, Q24H  mupirocin, 1 Application, Each Nare, BID  sodium chloride, 10 mL, Intravenous, Q12H  vancomycin, 1,250 mg, Intravenous, Q24H      Current PRN Medications:    acetaminophen **OR** acetaminophen    Calcium Replacement - Follow Nurse / BPA Driven Protocol    diphenhydrAMINE    Magnesium Standard Dose Replacement - Follow Nurse / BPA Driven Protocol    nitroglycerin    ondansetron    Phosphorus Replacement - Follow Nurse / BPA Driven Protocol    Potassium Replacement - Follow Nurse / BPA Driven Protocol    sodium chloride    sodium chloride    norepinephrine, 0.02-0.3 mcg/kg/min, Last Rate: 0.04 mcg/kg/min (24 1534)  sodium chloride, 125 mL/hr, Last Rate: 125 mL/hr (24 0232)           Objective     Vital Signs:  Temp (24hrs), Av.3 °F (38.5 °C), Min:99.1 °F (37.3 °C), Max:102.8 °F (39.3 °C)      /70   Pulse 111   Temp 99.1 °F (37.3 °C) (Axillary)   Resp (!) 36   Ht 160 cm (63\")   Wt 77.6 kg (171 lb)   SpO2 97%   BMI 30.29 kg/m²         Physical Exam:  General: The patient is sitting in bed after just getting on the bedside toilet.  She actually appears somewhat better today but is also more alert  Respiratory: Effort even and unlabored.  She does have some shallow respirations  Heart: S1 is 2 no murmur appreciated  Lower extremity edema improved.  Upper extremity edema persist.        Results Review:    I " reviewed the patient's new clinical results.    Lab Results:    CBC:   Lab Results   Lab 01/30/24 2350 01/31/24 0409 02/01/24  0432   WBC 27.09* 21.12* 17.88*   HEMOGLOBIN 9.1* 7.7* 7.8*   HEMATOCRIT 27.5* 22.8* 23.4*   PLATELETS 46* 41* 36*        AutoDiff:   Lab Results   Lab 01/30/24 2350 02/01/24  0432   NEUTROS ABS 26.01* 16.63*        Manual Diff:    Lab Results   Lab 01/30/24 2350 02/01/24 0432   NEUTROPHIL % 95.0* 93.0*   LYMPHOCYTE % 2.0* 2.0*   MONOCYTES % 2.0* 3.0*   BANDS % 1.0  --    NEUTROS ABS 26.01* 16.63*   LYMPHS ABS 0.54* 0.72   ANISOCYTOSIS Slight/1+ Slight/1+   POIKILOCYTES Slight/1+ Slight/1+           CMP:   Lab Results   Lab 01/30/24 2350 01/31/24 0409 02/01/24 0432   SODIUM 128* 132* 134*   POTASSIUM 3.4* 3.3* 2.8*   CHLORIDE 92* 97* 103   CO2 17.0* 18.0* 18.0*   BUN 59* 58* 55*   CREATININE 2.35* 1.86* 1.67*   CALCIUM 7.8* 7.1* 7.3*   BILIRUBIN 2.3* 2.5* 2.3*   ALK PHOS 163* 143* 201*   ALT (SGPT) 32 27 20   AST (SGOT) 26 23 26   GLUCOSE 73 97 118*       Estimated Creatinine Clearance: 46.8 mL/min (A) (by C-G formula based on SCr of 1.67 mg/dL (H)).    Culture Results:    Microbiology Results (last 10 days)       Procedure Component Value - Date/Time    MRSA Screen, PCR (Inpatient) - Swab, Nares [[id number]]  (Abnormal) Collected: 01/31/24 1526    Lab Status: Final result Specimen: Swab from Nares Updated: 01/31/24 0539     MRSA PCR MRSA Detected    Narrative:      The negative predictive value of this diagnostic test is high and should only be used to consider de-escalating anti-MRSA therapy. A positive result may indicate colonization with MRSA and must be correlated clinically.    Blood Culture - Blood, Arm, Left [[id number]]  (Abnormal) Collected: 01/30/24 0147    Lab Status: Preliminary result Specimen: Blood from Arm, Left Updated: 02/01/24 0527     Blood Culture Staphylococcus aureus, MRSA     Comment:   Infectious disease consultation is highly recommended to rule out distant  foci of infection.  Methicillin resistant Staphylococcus aureus, Patient may be an isolation risk.        Isolated from Pediatric Bottle     Gram Stain Pediatric Bottle Gram positive cocci in clusters    Blood Culture - Blood, Arm, Left [[id number]]  (Abnormal) Collected: 01/30/24 2350    Lab Status: Preliminary result Specimen: Blood from Arm, Left Updated: 02/01/24 0527     Blood Culture Staphylococcus aureus, MRSA     Comment:   Infectious disease consultation is highly recommended to rule out distant foci of infection.  Methicillin resistant Staphylococcus aureus, Patient may be an isolation risk.        Isolated from Aerobic Bottle     Gram Stain Pediatric Bottle Gram positive cocci in clusters    Blood Culture ID, PCR - Blood, Arm, Left [[id number]]  (Abnormal) Collected: 01/30/24 2350    Lab Status: Final result Specimen: Blood from Arm, Left Updated: 01/31/24 1359     BCID, PCR Staph aureus. mecA/C and MREJ (methicillin resistance gene) detected. Identification by BCID2 PCR.     BOTTLE TYPE Pediatric Bottle    Narrative:      Infectious disease consultation is highly recommended to rule out distant foci of infection.                 Radiology:   Imaging Results (Last 72 Hours)       Procedure Component Value Units Date/Time    XR Abdomen KUB [[id number]] Collected: 01/31/24 0703     Updated: 01/31/24 0708    Narrative:      XR ABDOMEN KUB- 1/31/2024 2:17 AM     HISTORY: Diarrhea     COMPARISON: None     FINDINGS:     There is a nonobstructive bowel gas pattern. No gross intraperitoneal  free air is present. No urolithiasis identified. The osseous structures  demonstrate no acute process.       Impression:         1.  No radiographic evidence of acute abdominopelvic process.           This report was signed and finalized on 1/31/2024 7:05 AM by Dr Apolinar Coronado.       XR Chest 1 View [[id number]] Collected: 01/31/24 0656     Updated: 01/31/24 0701    Narrative:      XR CHEST 1 VW- 1/31/2024 2:14 AM     HISTORY:  Sepsis       COMPARISON: None     FINDINGS:  Upright frontal radiograph of the chest was obtained     Bilateral interstitial coarsening. Lungs are well expanded. No  consolidation or pleural effusion. No pneumothorax. Heart size is  normal. No acute bony abnormality seen.       Impression:      1.  Bilateral interstitial coarsening which may reflect a mild  interstitial edema. There is no consolidation or pleural effusion. Lungs  are well expanded.     This report was signed and finalized on 1/31/2024 6:58 AM by Dr Apolinar Coronado.                   Active Hospital Problems    Diagnosis     **Sepsis associated hypotension     Methamphetamine use     Hyponatremia     Anemia     Thrombocytopenia     Tricuspid valve vegetation     Bacterial endocarditis     DARRYN (acute kidney injury)     Chronic hepatitis C without hepatic coma        IMPRESSION:  Septic shock still requiring norepinephrine.  Fortunately has not required any increasing dose.  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  At this point trying to avoid anti-inflammatories given acute kidney injury.  Injection drug use-methamphetamine  Acute kidney injury-improving  Thrombocytopenia-trending down.  No active bleeding  Leukocytosis-trending down  Hypokalemia      RECOMMENDATION:   Continue vancomycin  Continue surveillance cultures  Await further recommendations per cardiothoracic surgery  Continue supportive care  Continue to recommend holding off on PICC unless absolutely necessary given patient's ongoing bacteremia and intravascular infection  Continue intranasal Bactroban  Potassium supplementation per attending  Continue with acetaminophen, tepid baths, fan for fevers.  Trying to avoid nonsteroidals given acute kidney injury.  If patient becomes hemodynamically unstable due to tachycardia from fever, could consider low-dose nonsteroidal with ongoing hydration.    Discussed with   Lian  Discussed with BARAK Ortiz    55 minutes spent including review of chart, bedside evaluation, review of orders, review of other providers notes, charting, discussion with Dr. Beal and BARAK Ortiz MD  02/01/24  08:07 CST

## 2024-02-01 NOTE — CONSULTS
Referring Provider: Dr. Kevin Ramirez  Reason for Consultation: Tricuspid valve endocarditis    Patient Care Team:  Vanesa Guevara APRN as PCP - General (Family Medicine)    Chief complaint: Fatigue, lower extremity edema    Subjective .     History of present illness: Ms. Kate is a 33-year-old female with known anxiety, depression, GERD, and hepatitis C.  She was transferred from [facility] where she presented there due to lower extremity edema and itching.  She admitted to methamphetamine use 4 days prior and was concerned that this had been laced with wasp spray.  She also reported fever of 102 at home.  She was found to be severely hyponatremic with sodium 126, CO2 19, elevated ALT and AST at 47 and 185, lactate 8.2.  Drug screen was positive for methamphetamine.  WBC 23, hemoglobin 10.2, and platelets 64.  She was admitted to the hospitalist service for further evaluation.  Infectious disease was consulted and blood cultures are positive for staphylococcus auerus MRSA.  She underwent transthoracic echo yesterday revealing a 20 mm mass on the tricuspid valve consistent with vegetation.  Cardiothoracic surgery has been consulted for the aforementioned.  She is currently being treated with vancomycin.  She is tolerating room air.  Creatinine today is 1.67, hemoglobin 7.8, platelets 36, potassium 2.8, WBC improved at 17.  Albumin is 2.1.  She remains on Levophed.  Blood cultures are being repeated today    History  Code Status and Medical Interventions:   Ordered at: 24 7048     Level Of Support Discussed With:    Patient     Code Status (Patient has no pulse and is not breathing):    CPR (Attempt to Resuscitate)     Medical Interventions (Patient has pulse or is breathing):    Full Support         Past Medical History:   Diagnosis Date    Anxiety     Depressed     GERD (gastroesophageal reflux disease)     Hepatitis C    ,   Past Surgical History:   Procedure Laterality Date      "SECTION      x 2    TONSILLECTOMY     ,   Family History   Problem Relation Age of Onset    Colon cancer Neg Hx     Colon polyps Neg Hx    ,   Social History     Tobacco Use    Smoking status: Every Day     Packs/day: .25     Types: Cigarettes    Smokeless tobacco: Never   Substance Use Topics    Alcohol use: No    Drug use: Yes     Types: Methamphetamines   ,   No medications prior to admission.   , Allergies: Patient has no known allergies.    Review of Systems  Review of Systems   Constitutional:  Positive for activity change, chills, diaphoresis, fatigue and fever.   HENT:  Negative for trouble swallowing and voice change.    Eyes:  Negative for visual disturbance.   Respiratory:  Negative for chest tightness and shortness of breath.    Cardiovascular:  Positive for leg swelling. Negative for chest pain and palpitations.   Gastrointestinal:  Negative for abdominal pain, diarrhea, nausea and vomiting.   Genitourinary:  Negative for difficulty urinating, dysuria and hematuria.   Musculoskeletal:  Negative for arthralgias and myalgias.   Skin:  Negative for color change, pallor, rash and wound.   Allergic/Immunologic: Negative for immunocompromised state.   Neurological:  Negative for dizziness, syncope and light-headedness.   Psychiatric/Behavioral:  Negative for agitation, confusion and sleep disturbance.         Objective     Vital Signs   Visit Vitals  /70   Pulse 111   Temp 99.1 °F (37.3 °C) (Axillary)   Resp (!) 36   Ht 160 cm (63\")   Wt 77.6 kg (171 lb)   SpO2 97%   BMI 30.29 kg/m²       Physical Exam  Vitals reviewed.   Constitutional:       Appearance: She is ill-appearing.   HENT:      Head: Normocephalic.      Mouth/Throat:      Comments: Poor dentition  Eyes:      Pupils: Pupils are equal, round, and reactive to light.   Cardiovascular:      Rate and Rhythm: Regular rhythm. Tachycardia present.      Heart sounds: Murmur heard.   Pulmonary:      Effort: Pulmonary effort is normal.      Breath " sounds: No wheezing or rales.   Abdominal:      General: There is no distension.      Palpations: Abdomen is soft.      Tenderness: There is no abdominal tenderness.   Musculoskeletal:         General: Swelling present.      Right lower leg: Edema present.      Left lower leg: Edema present.   Skin:     General: Skin is warm.      Comments: diaphoretic   Neurological:      General: No focal deficit present.      Mental Status: She is alert and oriented to person, place, and time.   Psychiatric:         Mood and Affect: Mood normal.         Behavior: Behavior normal.         Thought Content: Thought content normal.         Judgment: Judgment normal.           LAB:   CBC:  Results from last 7 days   Lab Units 02/01/24  0432 01/31/24  0409 01/30/24  2350   WBC 10*3/mm3 17.88* 21.12* 27.09*   HEMATOCRIT % 23.4* 22.8* 27.5*   PLATELETS 10*3/mm3 36* 41* 46*          BMP:)  Results from last 7 days   Lab Units 02/01/24  0432 01/31/24  0409 01/30/24  2350   SODIUM mmol/L 134* 132* 128*   POTASSIUM mmol/L 2.8* 3.3* 3.4*   CHLORIDE mmol/L 103 97* 92*   CO2 mmol/L 18.0* 18.0* 17.0*   GLUCOSE mg/dL 118* 97 73   BUN mg/dL 55* 58* 59*   CREATININE mg/dL 1.67* 1.86* 2.35*           COAG:  Results from last 7 days   Lab Units 01/31/24  0407   INR  1.29*   APTT seconds 42.9*           IMAGES:       Imaging Results (Last 24 Hours)       ** No results found for the last 24 hours. **                         Assessment & Plan          Sepsis associated hypotension    Chronic hepatitis C without hepatic coma    Methamphetamine use    Hyponatremia    Anemia    Thrombocytopenia    Tricuspid valve vegetation    Bacterial endocarditis    DARRYN (acute kidney injury)    Check prealbumin  Agree with antibiotics and will follow-up blood cultures  No plans for surgical intervention at this time from CT surgery standpoint.  Recommend ongoing antibiotics and monitoring for now.      Infectious disease following for sepsis and antibiotic  management  Hospitalist managing DARRYN, thrombocytopenia, anemia, hyponatremia    We will continue to follow along.  Please do not hesitate to call with questions or concerns.    KELLY Sandra  02/01/24  08:20 CST

## 2024-02-02 LAB
ALBUMIN SERPL-MCNC: 2 G/DL (ref 3.5–5.2)
ALBUMIN SERPL-MCNC: 2.2 G/DL (ref 3.5–5.2)
ALBUMIN/GLOB SERPL: 0.7 G/DL
ALP SERPL-CCNC: 199 U/L (ref 39–117)
ALT SERPL W P-5'-P-CCNC: 19 U/L (ref 1–33)
ANION GAP SERPL CALCULATED.3IONS-SCNC: 12 MMOL/L (ref 5–15)
ANION GAP SERPL CALCULATED.3IONS-SCNC: 15 MMOL/L (ref 5–15)
AST SERPL-CCNC: 23 U/L (ref 1–32)
BACTERIA SPEC AEROBE CULT: ABNORMAL
BACTERIA SPEC AEROBE CULT: ABNORMAL
BILIRUB SERPL-MCNC: 1.3 MG/DL (ref 0–1.2)
BUN SERPL-MCNC: 62 MG/DL (ref 6–20)
BUN SERPL-MCNC: 64 MG/DL (ref 6–20)
BUN/CREAT SERPL: 28.4 (ref 7–25)
BUN/CREAT SERPL: 28.6 (ref 7–25)
CALCIUM SPEC-SCNC: 7.2 MG/DL (ref 8.6–10.5)
CALCIUM SPEC-SCNC: 7.3 MG/DL (ref 8.6–10.5)
CHLORIDE SERPL-SCNC: 106 MMOL/L (ref 98–107)
CHLORIDE SERPL-SCNC: 108 MMOL/L (ref 98–107)
CO2 SERPL-SCNC: 13 MMOL/L (ref 22–29)
CO2 SERPL-SCNC: 18 MMOL/L (ref 22–29)
CREAT SERPL-MCNC: 2.17 MG/DL (ref 0.57–1)
CREAT SERPL-MCNC: 2.25 MG/DL (ref 0.57–1)
DEPRECATED RDW RBC AUTO: 45.2 FL (ref 37–54)
DOHLE BOD BLD QL SMEAR: PRESENT
EGFRCR SERPLBLD CKD-EPI 2021: 28.7 ML/MIN/1.73
EGFRCR SERPLBLD CKD-EPI 2021: 30 ML/MIN/1.73
EOSINOPHIL # BLD MANUAL: 0.12 10*3/MM3 (ref 0–0.4)
EOSINOPHIL NFR BLD MANUAL: 1 % (ref 0.3–6.2)
ERYTHROCYTE [DISTWIDTH] IN BLOOD BY AUTOMATED COUNT: 15 % (ref 12.3–15.4)
GLOBULIN UR ELPH-MCNC: 3.3 GM/DL
GLUCOSE SERPL-MCNC: 106 MG/DL (ref 65–99)
GLUCOSE SERPL-MCNC: 91 MG/DL (ref 65–99)
GRAM STN SPEC: ABNORMAL
GRAM STN SPEC: ABNORMAL
HCT VFR BLD AUTO: 23.4 % (ref 34–46.6)
HGB BLD-MCNC: 7.7 G/DL (ref 12–15.9)
HYPOCHROMIA BLD QL: ABNORMAL
ISOLATED FROM: ABNORMAL
ISOLATED FROM: ABNORMAL
LYMPHOCYTES # BLD MANUAL: 0.48 10*3/MM3 (ref 0.7–3.1)
LYMPHOCYTES NFR BLD MANUAL: 2 % (ref 5–12)
MCH RBC QN AUTO: 27.3 PG (ref 26.6–33)
MCHC RBC AUTO-ENTMCNC: 32.9 G/DL (ref 31.5–35.7)
MCV RBC AUTO: 83 FL (ref 79–97)
MONOCYTES # BLD: 0.24 10*3/MM3 (ref 0.1–0.9)
NEUTROPHILS # BLD AUTO: 11.22 10*3/MM3 (ref 1.7–7)
NEUTROPHILS NFR BLD MANUAL: 93 % (ref 42.7–76)
PHOSPHATE SERPL-MCNC: 3 MG/DL (ref 2.5–4.5)
PLATELET # BLD AUTO: 33 10*3/MM3 (ref 140–450)
PMV BLD AUTO: 14 FL (ref 6–12)
POIKILOCYTOSIS BLD QL SMEAR: ABNORMAL
POLYCHROMASIA BLD QL SMEAR: ABNORMAL
POTASSIUM SERPL-SCNC: 2.9 MMOL/L (ref 3.5–5.2)
POTASSIUM SERPL-SCNC: 5.3 MMOL/L (ref 3.5–5.2)
POTASSIUM SERPL-SCNC: 5.3 MMOL/L (ref 3.5–5.2)
PROT SERPL-MCNC: 5.5 G/DL (ref 6–8.5)
RBC # BLD AUTO: 2.82 10*6/MM3 (ref 3.77–5.28)
SMALL PLATELETS BLD QL SMEAR: ABNORMAL
SODIUM SERPL-SCNC: 136 MMOL/L (ref 136–145)
SODIUM SERPL-SCNC: 136 MMOL/L (ref 136–145)
TOXIC GRANULATION: ABNORMAL
VARIANT LYMPHS NFR BLD MANUAL: 4 % (ref 19.6–45.3)
WBC NRBC COR # BLD AUTO: 12.06 10*3/MM3 (ref 3.4–10.8)

## 2024-02-02 PROCEDURE — 84132 ASSAY OF SERUM POTASSIUM: CPT | Performed by: FAMILY MEDICINE

## 2024-02-02 PROCEDURE — 25010000002 VANCOMYCIN 1 G RECONSTITUTED SOLUTION 1 EACH VIAL: Performed by: INTERNAL MEDICINE

## 2024-02-02 PROCEDURE — 87040 BLOOD CULTURE FOR BACTERIA: CPT | Performed by: INTERNAL MEDICINE

## 2024-02-02 PROCEDURE — 25810000003 SODIUM CHLORIDE 0.9 % SOLUTION 250 ML FLEX CONT: Performed by: INTERNAL MEDICINE

## 2024-02-02 PROCEDURE — 85025 COMPLETE CBC W/AUTO DIFF WBC: CPT | Performed by: FAMILY MEDICINE

## 2024-02-02 PROCEDURE — 87147 CULTURE TYPE IMMUNOLOGIC: CPT | Performed by: INTERNAL MEDICINE

## 2024-02-02 PROCEDURE — 99232 SBSQ HOSP IP/OBS MODERATE 35: CPT | Performed by: INTERNAL MEDICINE

## 2024-02-02 PROCEDURE — 80053 COMPREHEN METABOLIC PANEL: CPT | Performed by: FAMILY MEDICINE

## 2024-02-02 PROCEDURE — 94640 AIRWAY INHALATION TREATMENT: CPT

## 2024-02-02 PROCEDURE — 85007 BL SMEAR W/DIFF WBC COUNT: CPT | Performed by: FAMILY MEDICINE

## 2024-02-02 PROCEDURE — 84100 ASSAY OF PHOSPHORUS: CPT | Performed by: FAMILY MEDICINE

## 2024-02-02 PROCEDURE — 99232 SBSQ HOSP IP/OBS MODERATE 35: CPT | Performed by: SURGERY

## 2024-02-02 PROCEDURE — 94799 UNLISTED PULMONARY SVC/PX: CPT

## 2024-02-02 PROCEDURE — 25810000003 SODIUM CHLORIDE 0.9 % SOLUTION: Performed by: INTERNAL MEDICINE

## 2024-02-02 RX ORDER — POTASSIUM CHLORIDE 750 MG/1
40 CAPSULE, EXTENDED RELEASE ORAL ONCE
Status: COMPLETED | OUTPATIENT
Start: 2024-02-02 | End: 2024-02-02

## 2024-02-02 RX ORDER — IPRATROPIUM BROMIDE AND ALBUTEROL SULFATE 2.5; .5 MG/3ML; MG/3ML
3 SOLUTION RESPIRATORY (INHALATION) EVERY 4 HOURS PRN
Status: DISCONTINUED | OUTPATIENT
Start: 2024-02-02 | End: 2024-02-04 | Stop reason: HOSPADM

## 2024-02-02 RX ADMIN — POTASSIUM CHLORIDE 40 MEQ: 10 CAPSULE, COATED, EXTENDED RELEASE ORAL at 04:26

## 2024-02-02 RX ADMIN — Medication 500 MG: at 08:03

## 2024-02-02 RX ADMIN — TRAMADOL HYDROCHLORIDE 50 MG: 50 TABLET, COATED ORAL at 23:56

## 2024-02-02 RX ADMIN — SODIUM CHLORIDE 125 ML/HR: 9 INJECTION, SOLUTION INTRAVENOUS at 09:24

## 2024-02-02 RX ADMIN — TRAMADOL HYDROCHLORIDE 50 MG: 50 TABLET, COATED ORAL at 12:56

## 2024-02-02 RX ADMIN — VANCOMYCIN HYDROCHLORIDE 1000 MG: 1 INJECTION, POWDER, LYOPHILIZED, FOR SOLUTION INTRAVENOUS at 16:06

## 2024-02-02 RX ADMIN — ACETAMINOPHEN 975 MG: 325 TABLET, FILM COATED ORAL at 14:51

## 2024-02-02 RX ADMIN — CHLORHEXIDINE GLUCONATE 1 APPLICATION: 500 CLOTH TOPICAL at 04:26

## 2024-02-02 RX ADMIN — SODIUM ZIRCONIUM CYCLOSILICATE 10 G: 10 POWDER, FOR SUSPENSION ORAL at 12:18

## 2024-02-02 RX ADMIN — ACETAMINOPHEN 975 MG: 325 TABLET, FILM COATED ORAL at 08:21

## 2024-02-02 RX ADMIN — IPRATROPIUM BROMIDE 0.5 MG: 0.5 SOLUTION RESPIRATORY (INHALATION) at 13:08

## 2024-02-02 RX ADMIN — Medication 10 ML: at 21:18

## 2024-02-02 RX ADMIN — Medication 10 ML: at 08:03

## 2024-02-02 RX ADMIN — Medication 1 APPLICATION: at 21:18

## 2024-02-02 RX ADMIN — SODIUM CHLORIDE 125 ML/HR: 9 INJECTION, SOLUTION INTRAVENOUS at 17:08

## 2024-02-02 RX ADMIN — Medication 1 APPLICATION: at 08:02

## 2024-02-02 RX ADMIN — Medication 500 MG: at 21:18

## 2024-02-02 NOTE — PROGRESS NOTES
"Pharmacy Dosing Service  Pharmacokinetics  Vancomycin Follow-up Evaluation    Assessment/Action/Plan:  Current Order: Vancomycin 1250 mg IVPB every 24 hours  Current end date:3/12/2024  Levels: Vancomycin trough ordered before dose due on 2/3/2024 at 1630  02/01/2024 1445 Vancomycin trough = 12.4 (21 hours post 1250 mg dose)   Additional systemic antimicrobial agent(s): none  Scr = 2.17 (up from 1.67)  Vancomycin adjusted to 1000 mg iv q24h. Pharmacy will continue to follow daily and adjust dose accordingly.     Subjective:  Jaqui Kate is a 33 y.o. female currently on Vancomycin for the treatment of MRSA bacteremia; Endocarditis tricuspid valve vegetation; sepsis, day 3 of 42 of treatment.    AUC Model Data:  Regimen: 1000 mg IV every 24 hours.  Exposure target: AUC24 (range)400-600 mg/L.hr   AUC24,ss: 445 mg/L.hr  PAUC*: 72 %  Ctrough,ss: 14 mg/L  Pconc*: 10 %  Tox.: 9 %    Objective:  Ht: 160 cm (63\"); Wt: 84.1 kg (185 lb 6.5 oz)  Estimated Creatinine Clearance: 37.5 mL/min (A) (by C-G formula based on SCr of 2.17 mg/dL (H)).   Creatinine   Date Value Ref Range Status   02/02/2024 2.17 (H) 0.57 - 1.00 mg/dL Final   02/01/2024 1.67 (H) 0.57 - 1.00 mg/dL Final   01/31/2024 1.86 (H) 0.57 - 1.00 mg/dL Final      Lab Results   Component Value Date    WBC 12.06 (H) 02/02/2024    WBC 17.88 (H) 02/01/2024    WBC 21.12 (H) 01/31/2024         Lab Results   Component Value Date    VANCOTROUGH 12.40 02/01/2024       Culture Results:  Microbiology Results (last 10 days)       Procedure Component Value - Date/Time    Blood Culture With JEAN - Blood, Hand, Left [[id number]]  (Abnormal) Collected: 02/01/24 0432    Lab Status: Preliminary result Specimen: Blood from Hand, Left Updated: 02/01/24 4298     Blood Culture Abnormal Stain     Gram Stain Pediatric Bottle Gram positive cocci in clusters    MRSA Screen, PCR (Inpatient) - Swab, Nares [[id number]]  (Abnormal) Collected: 01/31/24 1526    Lab Status: Final result Specimen: " Swab from Nares Updated: 01/31/24 1651     MRSA PCR MRSA Detected    Narrative:      The negative predictive value of this diagnostic test is high and should only be used to consider de-escalating anti-MRSA therapy. A positive result may indicate colonization with MRSA and must be correlated clinically.    Blood Culture - Blood, Arm, Left [[id number]]  (Abnormal) Collected: 01/30/24 2356    Lab Status: Final result Specimen: Blood from Arm, Left Updated: 02/02/24 0505     Blood Culture Staphylococcus aureus, MRSA     Comment:   Infectious disease consultation is highly recommended to rule out distant foci of infection.  Methicillin resistant Staphylococcus aureus, Patient may be an isolation risk.        Isolated from Pediatric Bottle     Gram Stain Pediatric Bottle Gram positive cocci in clusters    Narrative:      Refer to previous blood culture collected on 01/31/2024 0050 for MICs      Blood Culture - Blood, Arm, Left [[id number]]  (Abnormal)  (Susceptibility) Collected: 01/30/24 2350    Lab Status: Final result Specimen: Blood from Arm, Left Updated: 02/02/24 0505     Blood Culture Staphylococcus aureus, MRSA     Comment:   Infectious disease consultation is highly recommended to rule out distant foci of infection.  Methicillin resistant Staphylococcus aureus, Patient may be an isolation risk.        Isolated from Pediatric Bottle     Gram Stain Pediatric Bottle Gram positive cocci in clusters    Susceptibility        Staphylococcus aureus, MRSA      LEIGH      Gentamicin <=0.5 ug/ml Susceptible      Oxacillin >=4 ug/ml Resistant      Rifampin <=0.5 ug/ml Susceptible      Vancomycin <=0.5 ug/ml Susceptible                           Blood Culture ID, PCR - Blood, Arm, Left [[id number]]  (Abnormal) Collected: 01/30/24 2350    Lab Status: Final result Specimen: Blood from Arm, Left Updated: 01/31/24 1359     BCID, PCR Staph aureus. mecA/C and MREJ (methicillin resistance gene) detected. Identification by BCID2 PCR.      BOTTLE TYPE Pediatric Bottle    Narrative:      Infectious disease consultation is highly recommended to rule out distant foci of infection.            Juvenal Thorne, PharmD   02/02/24 07:50 CST

## 2024-02-02 NOTE — PLAN OF CARE
Goal Outcome Evaluation:  Plan of Care Reviewed With: caregiver        Progress: no change  Outcome Evaluation: Ntn follow up. Nursing reports pt drank her oral supplement this am for breakfast. Minimal oral intake available to review. Regular diet. Mighty Shakes TID, yogurt bid. Adjust oral supplements; Boost glucose control bid, mighty shake daily. Con to follow for plan of care.

## 2024-02-02 NOTE — PROGRESS NOTES
"INFECTIOUS DISEASES PROGRESS NOTE    Patient:  Jaqui Kate  YOB: 1990  MRN: [id number]   Admit date: 2024   Admitting Physician: Kevin Ramirez DO  Primary Care Physician: Vanesa Guevara APRN    Chief Complaint: Pleuritic chest pain        Interval History: Patient says her #1 complaint this time is pleuritic chest pain.  He is not having any significant cough.  She still says her mouth is quite sore.  Overall temperature curve improved.  Still with upper extremity swelling.  She says she just otherwise \"hurts all over\"  Norepinephrine stopped early this morning.    Allergies: No Known Allergies    Current Scheduled Medications:   Chlorhexidine Gluconate Cloth, 1 Application, Topical, Q24H  mupirocin, 1 Application, Each Nare, BID  saccharomyces boulardii, 500 mg, Oral, BID  sodium chloride, 10 mL, Intravenous, Q12H  vancomycin, 1,250 mg, Intravenous, Q24H      Current PRN Medications:    acetaminophen **OR** acetaminophen    acetaminophen    Calcium Replacement - Follow Nurse / BPA Driven Protocol    diphenhydrAMINE    Magnesium Standard Dose Replacement - Follow Nurse / BPA Driven Protocol    nitroglycerin    ondansetron    Phosphorus Replacement - Follow Nurse / BPA Driven Protocol    Potassium Replacement - Follow Nurse / BPA Driven Protocol    sodium chloride    sodium chloride    traMADol    norepinephrine, 0.02-0.3 mcg/kg/min, Last Rate: Stopped (24 0600)  sodium chloride, 125 mL/hr, Last Rate: 125 mL/hr (24 1725)           Objective     Vital Signs:  Temp (24hrs), Av.5 °F (38.1 °C), Min:98.3 °F (36.8 °C), Max:103.2 °F (39.6 °C)      /72   Pulse 111   Temp 98.7 °F (37.1 °C) (Axillary)   Resp (!) 30   Ht 160 cm (63\")   Wt 84.1 kg (185 lb 6.5 oz)   LMP 2024 (Exact Date)   SpO2 93%   BMI 32.84 kg/m²         Physical Exam:  General: Patient is ill-appearing lying in bed on her right side  HEENT: Sclera anicteric.  Oropharynx is very dry mucous " membranes.  Lips peeling some.  No thrush.  Heart: S1-S2 sounds are distant.  Lungs: Diminished breath sounds throughout  Extremities diffuse significant edema upper extremities worse than lower extremities.  Patient lying on her right side of elevated her left upper extremity on a pillow.      Results Review:    I reviewed the patient's new clinical results.    Lab Results:    CBC:   Lab Results   Lab 01/30/24 2350 01/31/24 0409 02/01/24 0432 02/02/24 0257   WBC 27.09* 21.12* 17.88* 12.06*   HEMOGLOBIN 9.1* 7.7* 7.8* 7.7*   HEMATOCRIT 27.5* 22.8* 23.4* 23.4*   PLATELETS 46* 41* 36* 33*        AutoDiff:   Lab Results   Lab 01/30/24 2350 02/01/24 0432 02/02/24 0257   NEUTROS ABS 26.01* 16.63* 11.22*   EOS ABS  --   --  0.12        Manual Diff:    Lab Results   Lab 01/30/24 2350 02/01/24 0432 02/02/24 0257   NEUTROPHIL % 95.0* 93.0* 93.0*   LYMPHOCYTE % 2.0* 2.0* 4.0*   MONOCYTES % 2.0* 3.0* 2.0*   BANDS % 1.0  --   --    NEUTROS ABS 26.01* 16.63* 11.22*   LYMPHS ABS 0.54* 0.72 0.48*   ANISOCYTOSIS Slight/1+ Slight/1+  --    POIKILOCYTES Slight/1+ Slight/1+ Slight/1+           CMP:   Lab Results   Lab 01/31/24 0409 02/01/24 0432 02/02/24 0257   SODIUM 132* 134* 136   POTASSIUM 3.3* 2.8* 2.9*   CHLORIDE 97* 103 106   CO2 18.0* 18.0* 18.0*   BUN 58* 55* 62*   CREATININE 1.86* 1.67* 2.17*   CALCIUM 7.1* 7.3* 7.2*   BILIRUBIN 2.5* 2.3* 1.3*   ALK PHOS 143* 201* 199*   ALT (SGPT) 27 20 19   AST (SGOT) 23 26 23   GLUCOSE 97 118* 91       Estimated Creatinine Clearance: 37.5 mL/min (A) (by C-G formula based on SCr of 2.17 mg/dL (H)).      Culture Results:    Microbiology Results (last 10 days)       Procedure Component Value - Date/Time    Blood Culture With JEAN - Blood, Hand, Left [[id number]]  (Abnormal) Collected: 02/01/24 9502    Lab Status: Preliminary result Specimen: Blood from Hand, Left Updated: 02/01/24 6059     Blood Culture Abnormal Stain     Gram Stain Pediatric Bottle Gram positive cocci in clusters     MRSA Screen, PCR (Inpatient) - Swab, Nares [[id number]]  (Abnormal) Collected: 01/31/24 1526    Lab Status: Final result Specimen: Swab from Nares Updated: 01/31/24 1651     MRSA PCR MRSA Detected    Narrative:      The negative predictive value of this diagnostic test is high and should only be used to consider de-escalating anti-MRSA therapy. A positive result may indicate colonization with MRSA and must be correlated clinically.    Blood Culture - Blood, Arm, Left [[id number]]  (Abnormal) Collected: 01/30/24 2356    Lab Status: Final result Specimen: Blood from Arm, Left Updated: 02/02/24 0505     Blood Culture Staphylococcus aureus, MRSA     Comment:   Infectious disease consultation is highly recommended to rule out distant foci of infection.  Methicillin resistant Staphylococcus aureus, Patient may be an isolation risk.        Isolated from Pediatric Bottle     Gram Stain Pediatric Bottle Gram positive cocci in clusters    Narrative:      Refer to previous blood culture collected on 01/31/2024 0050 for MICs      Blood Culture - Blood, Arm, Left [[id number]]  (Abnormal)  (Susceptibility) Collected: 01/30/24 2350    Lab Status: Final result Specimen: Blood from Arm, Left Updated: 02/02/24 0505     Blood Culture Staphylococcus aureus, MRSA     Comment:   Infectious disease consultation is highly recommended to rule out distant foci of infection.  Methicillin resistant Staphylococcus aureus, Patient may be an isolation risk.        Isolated from Pediatric Bottle     Gram Stain Pediatric Bottle Gram positive cocci in clusters    Susceptibility        Staphylococcus aureus, MRSA      LEIGH      Gentamicin Susceptible      Oxacillin Resistant      Rifampin Susceptible      Vancomycin Susceptible                           Blood Culture ID, PCR - Blood, Arm, Left [[id number]]  (Abnormal) Collected: 01/30/24 2350    Lab Status: Final result Specimen: Blood from Arm, Left Updated: 01/31/24 1359     BCID, PCR Staph aureus.  mecA/C and MREJ (methicillin resistance gene) detected. Identification by BCID2 PCR.     BOTTLE TYPE Pediatric Bottle    Narrative:      Infectious disease consultation is highly recommended to rule out distant foci of infection.                 Radiology:   Imaging Results (Last 72 Hours)       Procedure Component Value Units Date/Time    XR Abdomen KUB [[id number]] Collected: 01/31/24 0703     Updated: 01/31/24 0708    Narrative:      XR ABDOMEN KUB- 1/31/2024 2:17 AM     HISTORY: Diarrhea     COMPARISON: None     FINDINGS:     There is a nonobstructive bowel gas pattern. No gross intraperitoneal  free air is present. No urolithiasis identified. The osseous structures  demonstrate no acute process.       Impression:         1.  No radiographic evidence of acute abdominopelvic process.           This report was signed and finalized on 1/31/2024 7:05 AM by Dr Apolinar Coronado.       XR Chest 1 View [[id number]] Collected: 01/31/24 0656     Updated: 01/31/24 0701    Narrative:      XR CHEST 1 VW- 1/31/2024 2:14 AM     HISTORY: Sepsis       COMPARISON: None     FINDINGS:  Upright frontal radiograph of the chest was obtained     Bilateral interstitial coarsening. Lungs are well expanded. No  consolidation or pleural effusion. No pneumothorax. Heart size is  normal. No acute bony abnormality seen.       Impression:      1.  Bilateral interstitial coarsening which may reflect a mild  interstitial edema. There is no consolidation or pleural effusion. Lungs  are well expanded.     This report was signed and finalized on 1/31/2024 6:58 AM by Dr Apolinar Coronado.                   Active Hospital Problems    Diagnosis     **Sepsis associated hypotension     MRSA bacteremia     Methamphetamine use     Hyponatremia     Anemia     Thrombocytopenia     Tricuspid valve vegetation     Bacterial endocarditis     DARRYN (acute kidney injury)     Chronic hepatitis C without hepatic coma        IMPRESSION:  Septic shock-had been requiring low  doses of norepinephrine and it was stopped this morning around 6 AM.  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  Blood cultures continue to return positive rather quickly  Fever-appears temperature curve finally improving  Leukocytosis-trending down  Thrombocytopenia-trended down a little more today.  No report of active bleeding  Hypokalemia-patient dosed with 40 mill equivalents KCl this morning  History of injection drug use-methamphetamine  Acute kidney injury-had been improving however creatinine increased today.  Continues to get IV fluids at 125 ml an hour  Hepatitis C-untreated      RECOMMENDATION:   Continue vancomycin.  Pharmacy assisting with dosing.  High risk medication given issues with creatinine fluctuation  Continue to monitor surveillance cultures  Would continue to hold off on PICC line given high-grade bacteremia as long as possible  Fluid management, potassium supplementation per attending  Routine oral care discussed with BARAK Mi.  Patient had requested warm water to rinse mouth out.  Also suggested they try glycerin swabs, that, etc.  Elevate extremities above the level of the heart.  Particularly focused on upper extremities.  This was reviewed with the patient as well.        Debby Gastelum MD  02/02/24  07:37 CST

## 2024-02-02 NOTE — PLAN OF CARE
Goal Outcome Evaluation:   Pt A&Ox4. VSS. Max temp was 101, 975 mg of tylenol administered, last temp was 98.7. Levo gtt currently infusing at 0.02. Platelet count is 33; discussed with MD. Potassium is 2.9; replaced with 40 mEq PO. Adequate UOP. Multiple, small BM. Safety maintained.

## 2024-02-02 NOTE — PROGRESS NOTES
"Patient name: Jaqui Kate  Patient : 1990  VISIT # [id number]  MR #[id number]    Procedure:  Procedure Date:  POD:* No surgery found *    Subjective     Chief complaint: Fatigue and lower extremity edema    Patient resting in bed tolerating room air.  Creatinine today is up to 2.1.  Prealbumin is less than 3.0.  Blood cultures remain positive from 2024.  Platelets 33, hemoglobin 7.7 she remains on Levophed.    ROS: Positive for chills and fever, no chest pain, ongoing peripheral edema.    Telemetry: Sinus tachycardia 120s       Objective     Visit Vitals  BP (!) 89/71   Pulse (!) 121   Temp (!) 101 °F (38.3 °C) (Axillary)   Resp (!) 40   Ht 160 cm (63\")   Wt 84.1 kg (185 lb 6.5 oz)   LMP 2024 (Exact Date)   SpO2 93%   BMI 32.84 kg/m²       Intake/Output Summary (Last 24 hours) at 2024 0911  Last data filed at 2024 0800  Gross per 24 hour   Intake 2279 ml   Output 2000 ml   Net 279 ml       Lab:     CBC:  Results from last 7 days   Lab Units 24  0257 24  04324  0409   WBC 10*3/mm3 12.06* 17.88* 21.12*   HEMATOCRIT % 23.4* 23.4* 22.8*   PLATELETS 10*3/mm3 33* 36* 41*          BMP:  Results from last 7 days   Lab Units 24  0835 24  0257 24  04324  0409   SODIUM mmol/L  --  136 134* 132*   POTASSIUM mmol/L 5.3* 2.9* 2.8* 3.3*   CHLORIDE mmol/L  --  106 103 97*   CO2 mmol/L  --  18.0* 18.0* 18.0*   GLUCOSE mg/dL  --  91 118* 97   BUN mg/dL  --  62* 55* 58*   CREATININE mg/dL  --  2.17* 1.67* 1.86*          COAG:  Results from last 7 days   Lab Units 24  0407   INR  1.29*   APTT seconds 42.9*       IMAGES:       Imaging Results (Last 24 Hours)       ** No results found for the last 24 hours. **              Physical Exam:  General: Alert, oriented.  Ill-appearing  HEENT: Poor dentition  Cardiovascular: Tachycardia with murmur.  No rubs, or gallops.    Pulmonary: Clear to auscultation bilaterally without wheezing, rubs, or rales.   Abdomen: " Soft, nondistended, and nontender.  Extremities: Warm, moves all extremities.  Moderate peripheral edema  Skin: diaphoretic  Neurologic:  Grossly intact with no focal deficits.            Impression:  Tricuspid valve endocarditis  Thrombocytopenia  DARRYN  Anemia  Methamphetamine use  Chronic hepatitis C without hepatic coma  Sepsis  Severe malnutrition        Plan:  Continue supportive measures.  Continue antibiotics per ID.  When creatinine has improved, patient will need CT chest abdomen and pelvis.  She will also need SHANNON in the future.  No plans for surgical intervention at this time, will continue to see how patient progresses.  Continue to follow blood cultures    Antibiotic and sepsis management per infectious disease  DARRYN, thrombocytopenia, anemia managed by hospitalist      Deirdre Peralta, KELLY  02/02/24  09:11 CST

## 2024-02-02 NOTE — PROGRESS NOTES
[facility] Medicine Services  INPATIENT PROGRESS NOTE    Patient Name: Jaqui Kate  Date of Admission: 1/30/2024  Today's Date: 02/02/24  Length of Stay: 3  Primary Care Physician: Vanesa Guevara APRN    Subjective   Chief Complaint: Chest discomfort, arm and hand swelling  HPI     The patient continues to have some chest discomfort that is described as mild to moderate.  Maximum temperature over the past 24 hours is 101 degrees.  She remains blood culture positive.  White blood cell count is down to 12,000 with hemoglobin 7.7 and platelet count 33,000.  Renal function is slightly worse today with creatinine 2.25 and potassium 5.3.  She will be given one-time dose of Lokelma.  Will continue serial labs.  If renal function continues to decline, nephrology will be consulted.  She has been asked to elevate her arms and hands is much as possible.    Review of Systems   All pertinent negatives and positives are as above. All other systems have been reviewed and are negative unless otherwise stated.     Objective    Temp:  [98.3 °F (36.8 °C)-101 °F (38.3 °C)] 101 °F (38.3 °C)  Heart Rate:  [] 120  Resp:  [22-45] 45  BP: ()/(48-89) 109/57  Physical Exam  Constitutional:       General: She is sleeping.      Appearance: She is normal weight. She is ill-appearing.   HENT:      Head: Normocephalic and atraumatic.      Right Ear: External ear normal.      Left Ear: External ear normal.      Nose: Nose normal.      Mouth: Mucous membranes are moist.      Pharynx: Oropharynx is clear.   Eyes:      General: No scleral icterus.     Conjunctiva/sclera: Conjunctivae normal.   Cardiovascular:      Rate and Rhythm: Regular rhythm. Tachycardia present.      Pulses: Normal pulses.      Heart sounds: Murmur heard.      Systolic murmur is present with a grade of 2/6.   Pulmonary:      Effort: Pulmonary effort is normal. No respiratory distress.      Breath sounds: Normal breath sounds.    Abdominal:      General: Abdomen is flat. Bowel sounds are normal.      Palpations: Abdomen is soft. There is no mass.      Tenderness: There is no abdominal tenderness.   Musculoskeletal:         General: Normal range of motion.  Both upper extremities edematous.     Right lower leg: No edema.      Left lower leg: No edema.   Skin:     General: Skin is warm and dry.      Coloration: Skin is not pale.   Neurological:      General: No focal deficit present.      Mental Status: She is oriented to person, place, and time and easily aroused. Mental status is at baseline.   Psychiatric:         Mood and Affect: Mood normal.         Judgment: Judgment normal.     Results Review:  I have reviewed the labs, radiology results, and diagnostic studies.    Laboratory Data:   Results from last 7 days   Lab Units 02/02/24  0257 02/01/24  0432 01/31/24  0409   WBC 10*3/mm3 12.06* 17.88* 21.12*   HEMOGLOBIN g/dL 7.7* 7.8* 7.7*   HEMATOCRIT % 23.4* 23.4* 22.8*   PLATELETS 10*3/mm3 33* 36* 41*        Results from last 7 days   Lab Units 02/02/24  0835 02/02/24  0257 02/01/24  0432 01/31/24  0409   SODIUM mmol/L 136 136 134* 132*   POTASSIUM mmol/L 5.3*  5.3* 2.9* 2.8* 3.3*   CHLORIDE mmol/L 108* 106 103 97*   CO2 mmol/L 13.0* 18.0* 18.0* 18.0*   BUN mg/dL 64* 62* 55* 58*   CREATININE mg/dL 2.25* 2.17* 1.67* 1.86*   CALCIUM mg/dL 7.3* 7.2* 7.3* 7.1*   BILIRUBIN mg/dL  --  1.3* 2.3* 2.5*   ALK PHOS U/L  --  199* 201* 143*   ALT (SGPT) U/L  --  19 20 27   AST (SGOT) U/L  --  23 26 23   GLUCOSE mg/dL 106* 91 118* 97       Culture Data:   Blood Culture   Date Value Ref Range Status   02/01/2024 Abnormal Stain (C)  Preliminary   01/30/2024 Staphylococcus aureus, MRSA (C)  Final     Comment:       Infectious disease consultation is highly recommended to rule out distant foci of infection.  Methicillin resistant Staphylococcus aureus, Patient may be an isolation risk.   01/30/2024 Staphylococcus aureus, MRSA (C)  Final     Comment:        Infectious disease consultation is highly recommended to rule out distant foci of infection.  Methicillin resistant Staphylococcus aureus, Patient may be an isolation risk.       Radiology Data:   Imaging Results (Last 24 Hours)       ** No results found for the last 24 hours. **            I have reviewed the patient's current medications.     Assessment/Plan   Assessment  Active Hospital Problems    Diagnosis     **Sepsis associated hypotension     MRSA bacteremia     Methamphetamine use     Hyponatremia     Anemia     Thrombocytopenia     Tricuspid valve vegetation     Bacterial endocarditis     DARRYN (acute kidney injury)     Chronic hepatitis C without hepatic coma        Treatment Plan  Continue daily CBC and CMP  Continue IV vancomycin per infectious diseases  Continue IV fluids  Continue pressors as needed     Medical Decision Making  Number and Complexity of problems:   Sepsis with hypotension, acute, high complexity  Bacterial endocarditis with tricuspid vegetation and bacteremia, acute, high complexity, life-threatening  Hepatitis C, chronic, high complexity  Acute kidney injury, acute, moderate complexity  Thrombocytopenia, acute, moderate complexity  Hyponatremia, acute, moderate complexity  Anemia, chronic, moderate complexity  Intravenous methamphetamine abuse, chronic, high complexity     Differential Diagnosis:   None others considered at present     Conditions and Status        Condition is unchanged.     MDM Data  External documents reviewed: Care Everywhere documentation  Cardiac tracing (EKG, telemetry) interpretation: See HPI  Radiology interpretation: See HPI  Labs reviewed: See HPI  Any tests that were considered but not ordered: None     Decision rules/scores evaluated (example [id number], Wells, etc): None     Discussed with: The patient, cardiology and infectious diseases     Care Planning  Shared decision making: All services consulted  Code status and discussions: Full code      Disposition  Social Determinants of Health that impact treatment or disposition: IV drug abuse  I expect the patient to be discharged to ? in ? days.    Electronically signed by Kevin Ramirez DO, 02/02/24, 11:02 CST.    I spent 40 minutes providing critical care management to this patient. This excludes time spent in performing separately billed procedures.

## 2024-02-03 LAB
ABO GROUP BLD: NORMAL
ALBUMIN SERPL-MCNC: 1.9 G/DL (ref 3.5–5.2)
ALBUMIN/GLOB SERPL: 0.6 G/DL
ALP SERPL-CCNC: 200 U/L (ref 39–117)
ALT SERPL W P-5'-P-CCNC: 15 U/L (ref 1–33)
ANION GAP SERPL CALCULATED.3IONS-SCNC: 11 MMOL/L (ref 5–15)
ANISOCYTOSIS BLD QL: ABNORMAL
AST SERPL-CCNC: 15 U/L (ref 1–32)
BACTERIA SPEC AEROBE CULT: ABNORMAL
BACTERIA SPEC AEROBE CULT: ABNORMAL
BILIRUB SERPL-MCNC: 0.7 MG/DL (ref 0–1.2)
BLD GP AB SCN SERPL QL: NEGATIVE
BUN SERPL-MCNC: 66 MG/DL (ref 6–20)
BUN/CREAT SERPL: 30.3 (ref 7–25)
C DIFF TOX GENS STL QL NAA+PROBE: NEGATIVE
CALCIUM SPEC-SCNC: 7 MG/DL (ref 8.6–10.5)
CHLORIDE SERPL-SCNC: 107 MMOL/L (ref 98–107)
CO2 SERPL-SCNC: 14 MMOL/L (ref 22–29)
CREAT SERPL-MCNC: 2.18 MG/DL (ref 0.57–1)
DACRYOCYTES BLD QL SMEAR: ABNORMAL
DEPRECATED RDW RBC AUTO: 43.9 FL (ref 37–54)
DOHLE BOD BLD QL SMEAR: PRESENT
EGFRCR SERPLBLD CKD-EPI 2021: 29.8 ML/MIN/1.73
ERYTHROCYTE [DISTWIDTH] IN BLOOD BY AUTOMATED COUNT: 14.9 % (ref 12.3–15.4)
GIANT PLATELETS: ABNORMAL
GLOBULIN UR ELPH-MCNC: 3 GM/DL
GLUCOSE SERPL-MCNC: 107 MG/DL (ref 65–99)
GRAM STN SPEC: ABNORMAL
GRAM STN SPEC: ABNORMAL
HCT VFR BLD AUTO: 20.2 % (ref 34–46.6)
HGB BLD-MCNC: 6.6 G/DL (ref 12–15.9)
ISOLATED FROM: ABNORMAL
ISOLATED FROM: ABNORMAL
LYMPHOCYTES # BLD MANUAL: 0.51 10*3/MM3 (ref 0.7–3.1)
MCH RBC QN AUTO: 26.7 PG (ref 26.6–33)
MCHC RBC AUTO-ENTMCNC: 32.7 G/DL (ref 31.5–35.7)
MCV RBC AUTO: 81.8 FL (ref 79–97)
MICROCYTES BLD QL: ABNORMAL
NEUTROPHILS # BLD AUTO: 12.19 10*3/MM3 (ref 1.7–7)
NEUTROPHILS NFR BLD MANUAL: 95 % (ref 42.7–76)
NEUTS VAC BLD QL SMEAR: ABNORMAL
PLASMA CELL PREC NFR BLD MANUAL: 1 % (ref 0–0)
PLATELET # BLD AUTO: 34 10*3/MM3 (ref 140–450)
PMV BLD AUTO: 13.3 FL (ref 6–12)
POIKILOCYTOSIS BLD QL SMEAR: ABNORMAL
POLYCHROMASIA BLD QL SMEAR: ABNORMAL
POTASSIUM SERPL-SCNC: 3.4 MMOL/L (ref 3.5–5.2)
PROT SERPL-MCNC: 4.9 G/DL (ref 6–8.5)
RBC # BLD AUTO: 2.47 10*6/MM3 (ref 3.77–5.28)
RH BLD: POSITIVE
SMALL PLATELETS BLD QL SMEAR: ABNORMAL
SODIUM SERPL-SCNC: 132 MMOL/L (ref 136–145)
T&S EXPIRATION DATE: NORMAL
VANCOMYCIN TROUGH SERPL-MCNC: 16.2 MCG/ML (ref 5–20)
VARIANT LYMPHS NFR BLD MANUAL: 4 % (ref 19.6–45.3)
WBC NRBC COR # BLD AUTO: 12.83 10*3/MM3 (ref 3.4–10.8)

## 2024-02-03 PROCEDURE — 87040 BLOOD CULTURE FOR BACTERIA: CPT | Performed by: INTERNAL MEDICINE

## 2024-02-03 PROCEDURE — 85025 COMPLETE CBC W/AUTO DIFF WBC: CPT | Performed by: FAMILY MEDICINE

## 2024-02-03 PROCEDURE — 85007 BL SMEAR W/DIFF WBC COUNT: CPT | Performed by: FAMILY MEDICINE

## 2024-02-03 PROCEDURE — 80202 ASSAY OF VANCOMYCIN: CPT | Performed by: INTERNAL MEDICINE

## 2024-02-03 PROCEDURE — 87147 CULTURE TYPE IMMUNOLOGIC: CPT | Performed by: INTERNAL MEDICINE

## 2024-02-03 PROCEDURE — 86920 COMPATIBILITY TEST SPIN: CPT

## 2024-02-03 PROCEDURE — 86900 BLOOD TYPING SEROLOGIC ABO: CPT | Performed by: INTERNAL MEDICINE

## 2024-02-03 PROCEDURE — 86900 BLOOD TYPING SEROLOGIC ABO: CPT

## 2024-02-03 PROCEDURE — 25010000002 DIPHENHYDRAMINE PER 50 MG: Performed by: INTERNAL MEDICINE

## 2024-02-03 PROCEDURE — 94799 UNLISTED PULMONARY SVC/PX: CPT

## 2024-02-03 PROCEDURE — 36430 TRANSFUSION BLD/BLD COMPNT: CPT

## 2024-02-03 PROCEDURE — 87493 C DIFF AMPLIFIED PROBE: CPT | Performed by: FAMILY MEDICINE

## 2024-02-03 PROCEDURE — 25010000002 VANCOMYCIN 1 G RECONSTITUTED SOLUTION 1 EACH VIAL: Performed by: INTERNAL MEDICINE

## 2024-02-03 PROCEDURE — 86901 BLOOD TYPING SEROLOGIC RH(D): CPT | Performed by: INTERNAL MEDICINE

## 2024-02-03 PROCEDURE — 25810000003 SODIUM CHLORIDE 0.9 % SOLUTION: Performed by: INTERNAL MEDICINE

## 2024-02-03 PROCEDURE — 80053 COMPREHEN METABOLIC PANEL: CPT | Performed by: FAMILY MEDICINE

## 2024-02-03 PROCEDURE — P9016 RBC LEUKOCYTES REDUCED: HCPCS

## 2024-02-03 PROCEDURE — 99232 SBSQ HOSP IP/OBS MODERATE 35: CPT | Performed by: INTERNAL MEDICINE

## 2024-02-03 PROCEDURE — 25810000003 SODIUM CHLORIDE 0.9 % SOLUTION 250 ML FLEX CONT: Performed by: INTERNAL MEDICINE

## 2024-02-03 PROCEDURE — 86850 RBC ANTIBODY SCREEN: CPT | Performed by: INTERNAL MEDICINE

## 2024-02-03 RX ORDER — DIPHENHYDRAMINE HYDROCHLORIDE AND LIDOCAINE HYDROCHLORIDE AND ALUMINUM HYDROXIDE AND MAGNESIUM HYDRO
10 KIT EVERY 6 HOURS
Status: DISCONTINUED | OUTPATIENT
Start: 2024-02-03 | End: 2024-02-04 | Stop reason: HOSPADM

## 2024-02-03 RX ADMIN — Medication 10 ML: at 20:00

## 2024-02-03 RX ADMIN — Medication 10 ML: at 08:14

## 2024-02-03 RX ADMIN — Medication 500 MG: at 20:00

## 2024-02-03 RX ADMIN — Medication 500 MG: at 08:14

## 2024-02-03 RX ADMIN — VANCOMYCIN HYDROCHLORIDE 1000 MG: 1 INJECTION, POWDER, LYOPHILIZED, FOR SOLUTION INTRAVENOUS at 16:16

## 2024-02-03 RX ADMIN — SODIUM CHLORIDE 125 ML/HR: 9 INJECTION, SOLUTION INTRAVENOUS at 08:24

## 2024-02-03 RX ADMIN — DIPHENHYDRAMINE HYDROCHLORIDE AND LIDOCAINE HYDROCHLORIDE AND ALUMINUM HYDROXIDE AND MAGNESIUM HYDRO 10 ML: KIT at 09:06

## 2024-02-03 RX ADMIN — DIPHENHYDRAMINE HYDROCHLORIDE 25 MG: 50 INJECTION, SOLUTION INTRAMUSCULAR; INTRAVENOUS at 16:22

## 2024-02-03 RX ADMIN — TRAMADOL HYDROCHLORIDE 50 MG: 50 TABLET, COATED ORAL at 20:20

## 2024-02-03 RX ADMIN — DIPHENHYDRAMINE HYDROCHLORIDE AND LIDOCAINE HYDROCHLORIDE AND ALUMINUM HYDROXIDE AND MAGNESIUM HYDRO 10 ML: KIT at 16:16

## 2024-02-03 RX ADMIN — Medication 1 APPLICATION: at 20:00

## 2024-02-03 RX ADMIN — ACETAMINOPHEN 975 MG: 325 TABLET, FILM COATED ORAL at 00:38

## 2024-02-03 RX ADMIN — ACETAMINOPHEN 975 MG: 325 TABLET, FILM COATED ORAL at 18:54

## 2024-02-03 RX ADMIN — CHLORHEXIDINE GLUCONATE 1 APPLICATION: 500 CLOTH TOPICAL at 03:52

## 2024-02-03 RX ADMIN — ACETAMINOPHEN 975 MG: 325 TABLET, FILM COATED ORAL at 11:44

## 2024-02-03 RX ADMIN — Medication 1 APPLICATION: at 08:14

## 2024-02-03 RX ADMIN — IPRATROPIUM BROMIDE AND ALBUTEROL SULFATE 3 ML: .5; 3 SOLUTION RESPIRATORY (INHALATION) at 00:06

## 2024-02-03 RX ADMIN — DIPHENHYDRAMINE HYDROCHLORIDE AND LIDOCAINE HYDROCHLORIDE AND ALUMINUM HYDROXIDE AND MAGNESIUM HYDRO 10 ML: KIT at 21:46

## 2024-02-03 NOTE — PROGRESS NOTES
"INFECTIOUS DISEASES PROGRESS NOTE    Patient:  Jaqui Kate  YOB: 1990  MRN: [id number]   Admit date: 2024   Admitting Physician: Kevin Ramirez DO  Primary Care Physician: Vanesa Guevara APRN    Chief Complaint: Sore mouth      Interval History: Patient is eating some eggs.  Still complains of sore mouth.  Still complains of pleuritic type chest pain.  Also complained of hurting all over but when pressed if there is any 1 area of concern, she did indicate her right knee was hurting.    Patient's completed 1 unit of packed red cells this morning        Allergies: No Known Allergies    Current Scheduled Medications:   Chlorhexidine Gluconate Cloth, 1 Application, Topical, Q24H  mupirocin, 1 Application, Each Nare, BID  saccharomyces boulardii, 500 mg, Oral, BID  sodium chloride, 10 mL, Intravenous, Q12H  vancomycin, 1,000 mg, Intravenous, Q24H      Current PRN Medications:    acetaminophen **OR** acetaminophen    acetaminophen    Calcium Replacement - Follow Nurse / BPA Driven Protocol    diphenhydrAMINE    ipratropium-albuterol    Magnesium Standard Dose Replacement - Follow Nurse / BPA Driven Protocol    nitroglycerin    ondansetron    Phosphorus Replacement - Follow Nurse / BPA Driven Protocol    Potassium Replacement - Follow Nurse / BPA Driven Protocol    sodium chloride    sodium chloride    traMADol    norepinephrine, 0.02-0.3 mcg/kg/min, Last Rate: Stopped (24 0600)  sodium chloride, 125 mL/hr, Last Rate: 125 mL/hr (24 0824)           Objective     Vital Signs:  Temp (24hrs), Av.4 °F (37.4 °C), Min:97.7 °F (36.5 °C), Max:102.7 °F (39.3 °C)      /75   Pulse 99   Temp 98 °F (36.7 °C) (Axillary)   Resp (!) 30   Ht 160 cm (63\")   Wt 84 kg (185 lb 3 oz)   LMP 2024 (Exact Date)   SpO2 100%   BMI 32.80 kg/m²         Physical Exam:  General: The patient is sitting up in bed somewhat feeding herself breakfast very slowly.  HEENT: Sclera anicteric.  " Unable to get a good look in her mouth as she is eating eggs  Heart: S1 is 2 rate is regular  Lungs: Diminished throughout  Right knee: Patient without any increased warmth or obvious effusion.  Able to bend knee some actively.  Neuro: Alert         Results Review:    I reviewed the patient's new clinical results.    Lab Results:    CBC:   Lab Results   Lab 01/30/24 2350 01/31/24  0409 02/01/24 0432 02/02/24 0257 02/03/24  0340   WBC 27.09* 21.12* 17.88* 12.06* 12.83*   HEMOGLOBIN 9.1* 7.7* 7.8* 7.7* 6.6*   HEMATOCRIT 27.5* 22.8* 23.4* 23.4* 20.2*   PLATELETS 46* 41* 36* 33* 34*        AutoDiff:   Lab Results   Lab 02/01/24 0432 02/02/24 0257 02/03/24  0340   NEUTROS ABS 16.63* 11.22* 12.19*   EOS ABS  --  0.12  --         Manual Diff:    Lab Results   Lab 01/30/24 2350 02/01/24 0432 02/02/24 0257 02/03/24  0340   NEUTROPHIL % 95.0* 93.0* 93.0* 95.0*   LYMPHOCYTE % 2.0* 2.0* 4.0* 4.0*   MONOCYTES % 2.0* 3.0* 2.0*  --    BANDS % 1.0  --   --   --    NEUTROS ABS 26.01* 16.63* 11.22* 12.19*   LYMPHS ABS 0.54* 0.72 0.48* 0.51*   ANISOCYTOSIS Slight/1+ Slight/1+  --  Slight/1+   MICROCYTES  --   --   --  Slight/1+   POIKILOCYTES Slight/1+ Slight/1+ Slight/1+ Slight/1+           CMP:   Lab Results   Lab 02/01/24 0432 02/02/24 0257 02/02/24  0835 02/03/24  0340   SODIUM 134* 136 136 132*   POTASSIUM 2.8* 2.9* 5.3*  5.3* 3.4*   CHLORIDE 103 106 108* 107   CO2 18.0* 18.0* 13.0* 14.0*   BUN 55* 62* 64* 66*   CREATININE 1.67* 2.17* 2.25* 2.18*   CALCIUM 7.3* 7.2* 7.3* 7.0*   BILIRUBIN 2.3* 1.3*  --  0.7   ALK PHOS 201* 199*  --  200*   ALT (SGPT) 20 19  --  15   AST (SGOT) 26 23  --  15   GLUCOSE 118* 91 106* 107*       Estimated Creatinine Clearance: 37.3 mL/min (A) (by C-G formula based on SCr of 2.18 mg/dL (H)).      Culture Results:    Microbiology Results (last 10 days)       Procedure Component Value - Date/Time    Blood Culture With JEAN - Blood, Hand, Left [[id number]]  (Abnormal) Collected: 02/02/24 0250     Lab Status: Final result Specimen: Blood from Hand, Left Updated: 02/03/24 0528     Blood Culture Staphylococcus aureus, MRSA     Comment:   Methicillin resistant Staphylococcus aureus, Patient may be an isolation risk.        Isolated from Aerobic Bottle     Gram Stain Aerobic Bottle Gram positive cocci in clusters    Narrative:      Refer to previous blood culture collected on 01/31/2024 0050 for MICs.    Blood Culture With JEAN - Blood, Hand, Left [[id number]]  (Abnormal) Collected: 02/01/24 0432    Lab Status: Final result Specimen: Blood from Hand, Left Updated: 02/03/24 0528     Blood Culture Staphylococcus aureus, MRSA     Comment:   Methicillin resistant Staphylococcus aureus, Patient may be an isolation risk.        Isolated from Pediatric Bottle     Gram Stain Pediatric Bottle Gram positive cocci in clusters    Narrative:      Refer to previous blood culture collected on 01/31/2024 0050 for MICs.      MRSA Screen, PCR (Inpatient) - Swab, Nares [[id number]]  (Abnormal) Collected: 01/31/24 1526    Lab Status: Final result Specimen: Swab from Nares Updated: 01/31/24 1651     MRSA PCR MRSA Detected    Narrative:      The negative predictive value of this diagnostic test is high and should only be used to consider de-escalating anti-MRSA therapy. A positive result may indicate colonization with MRSA and must be correlated clinically.    Blood Culture - Blood, Arm, Left [[id number]]  (Abnormal) Collected: 01/30/24 2356    Lab Status: Final result Specimen: Blood from Arm, Left Updated: 02/02/24 0505     Blood Culture Staphylococcus aureus, MRSA     Comment:   Infectious disease consultation is highly recommended to rule out distant foci of infection.  Methicillin resistant Staphylococcus aureus, Patient may be an isolation risk.        Isolated from Pediatric Bottle     Gram Stain Pediatric Bottle Gram positive cocci in clusters    Narrative:      Refer to previous blood culture collected on 01/31/2024 0050 for  MICs      Blood Culture - Blood, Arm, Left [[id number]]  (Abnormal)  (Susceptibility) Collected: 01/30/24 2350    Lab Status: Final result Specimen: Blood from Arm, Left Updated: 02/02/24 0505     Blood Culture Staphylococcus aureus, MRSA     Comment:   Infectious disease consultation is highly recommended to rule out distant foci of infection.  Methicillin resistant Staphylococcus aureus, Patient may be an isolation risk.        Isolated from Pediatric Bottle     Gram Stain Pediatric Bottle Gram positive cocci in clusters    Susceptibility        Staphylococcus aureus, MRSA      LEIGH      Gentamicin Susceptible      Oxacillin Resistant      Rifampin Susceptible      Vancomycin Susceptible                           Blood Culture ID, PCR - Blood, Arm, Left [[id number]]  (Abnormal) Collected: 01/30/24 2350    Lab Status: Final result Specimen: Blood from Arm, Left Updated: 01/31/24 1359     BCID, PCR Staph aureus. mecA/C and MREJ (methicillin resistance gene) detected. Identification by BCID2 PCR.     BOTTLE TYPE Pediatric Bottle    Narrative:      Infectious disease consultation is highly recommended to rule out distant foci of infection.                 Radiology:   Imaging Results (Last 72 Hours)       ** No results found for the last 72 hours. 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  Blood cultures continue to return positive rather quickly.  Vancomycin dose adjusted yesterday due to increasing creatinine.  Level ordered for today.  Fever-appeared to be improving yesterday however spiked again to  102.7.  Leukocytosis-overall trended down.  Essentially stable today.  Thrombocytopenia-stable.  No report of any active bleeding  Anemia-hemoglobin 6.6 this morning.  Received 1 unit of packed red cells.  No report of any active bleeding  Hypokalemia-replaced and appears resolved.  History of injection drug use-methamphetamine  Acute kidney injury-hopefully has plateaued  Oral mucositis  Hepatitis C-untreated      RECOMMENDATION:   Continue vancomycin.  Pharmacy assisting with dosing.  High risk medication given issues with creatinine fluctuation.  Dose adjusted yesterday.  Level ordered for today prior to dose.  Continue to monitor surveillance cultures.  Ordered 1 for a.m. February 4.  Most recent cultures positive within about 12 hours  Would continue to hold off on PICC line for as long as possible given high-grade bacteremia   Fluid management, potassium supplementation per attending  Will order Magic mouthwash for patient  Continue to monitor for metastatic sites of infection.  Only focal complaint was right knee today.  Considered ordering CT chest today as I suspect she may have septic pulmonary emboli.  At this point do not think that is going to change our management and noted Dr. Thakkar wanted CT chest abdomen and pelvis prior to any plans for surgery.  If continued fever spikes, would proceed with CT chest abdomen pelvis.  Continue to try to elevate upper extremities above the level of the heart.  Discussed with BARAK Childers.  Plan to try to get patient up to chair today.    Reviewed Dr. Thakkar's note yesterday.  Reviewed Dr. Ramirez's note  Reviewed pharmacy note      Debby Gastelum MD  02/03/24  08:31 CST

## 2024-02-03 NOTE — PLAN OF CARE
Goal Outcome Evaluation:   Pt oriented x4. Pt slept most of night. Voided multiple times and had multiple BM's. Tramadol given x1. Max temp 101.4. 975 of tylenol given x1. Pt complained of SOB, 3L NC was put on for comfort, pt stated that she had a breathing treatment during day shift and that it helped her breathe better, discussed with MD and breathing tx q4h ordered. Levo gtt remained off throughout the night. Platelets 34, Hgb 6.6, Hct 20.2. 1 unit PRBCs ordered and is currently infusing. Safety maintained.

## 2024-02-03 NOTE — CONSULTS
Nutrition Services    Patient Name:  Jaqui Kate  YOB: 1990  MRN: [id number]  Admit Date:  1/30/2024    New nutrition consult for poor appetite and low albumin. Pt is already being followed by RD since 1/31 with interventions in place. Covering RD followed with pt yesterday and modified nutrition supplements. She is receiving Boost gluc control with breakfast and dinner, mighty shake with lunch. She is also receiving supplemental yogurt with breakfast and dinner. Food preferences have been obtained and are being observed. No further recommendations at this time. RD will cont to follow and encourage intake.    Electronically signed by:  Zaira Mukherjee RDN, LEATHA  02/03/24 12:41 CST

## 2024-02-03 NOTE — PROGRESS NOTES
[facility] Medicine Services  INPATIENT PROGRESS NOTE    Patient Name: Jaqui Kate  Date of Admission: 1/30/2024  Today's Date: 02/03/24  Length of Stay: 4  Primary Care Physician: Vanesa Guevara APRN    Subjective   Chief Complaint: Chest discomfort, arm and hand swelling  HPI     The patient's chest discomfort may be somewhat improved today.  She is out of bed sitting in a chair.  She has developed diarrhea and has had multiple liquid stools over the past 36 hours.  She was started on Florastor yesterday morning without response.  Hemoglobin this a.m. 6.6 and she has received 1 unit of PRBCs.  Platelet count remains low at 34,000.  Renal function is stable today with creatinine 2.18.  Potassium is 3.4 and will be replaced per protocol.  Albumin is quite low at 1.9.  Appropriate oral intake has been encouraged.  Nutrition will be consulted for dietary recommendations.  IV fluids will be reduced to 50 cc/h.  Repeat blood cultures remain positive at less than 12 hours.  Maximum temperature 102.7 over the past 24 hours.  There is concern for metastatic focus of infection and septic pulmonary emboli.  Agree with CT of chest, abdomen and pelvis if the patient's temperature continues to spike.    Review of Systems   All pertinent negatives and positives are as above. All other systems have been reviewed and are negative unless otherwise stated.     Objective    Temp:  [97.7 °F (36.5 °C)-102.7 °F (39.3 °C)] 98 °F (36.7 °C)  Heart Rate:  [0-142] 100  Resp:  [30-53] 38  BP: ()/(51-91) 105/68  Physical Exam  Constitutional:       General: She is sleeping.      Appearance: She is normal weight. She is ill-appearing.   HENT:      Head: Normocephalic and atraumatic.      Right Ear: External ear normal.      Left Ear: External ear normal.      Nose: Nose normal.      Mouth: Mucous membranes are moist.      Pharynx: Oropharynx is clear.   Eyes:      General: No scleral icterus.      Conjunctiva/sclera: Conjunctivae normal.   Cardiovascular:      Rate and Rhythm: Regular rhythm. Tachycardia present.      Pulses: Normal pulses.      Heart sounds: Murmur heard.      Systolic murmur is present with a grade of 2/6.   Pulmonary:      Effort: Pulmonary effort is normal. No respiratory distress.      Breath sounds: Normal breath sounds.   Abdominal:      General: Abdomen is flat. Bowel sounds are normal.      Palpations: Abdomen is soft. There is no mass.      Tenderness: There is no abdominal tenderness.   Musculoskeletal:         General: Normal range of motion.  Both upper extremities edematous.     Right lower leg: No edema.      Left lower leg: No edema.   Skin:     General: Skin is warm and dry.      Coloration: Skin is not pale.   Neurological:      General: No focal deficit present.      Mental Status: She is oriented to person, place, and time and easily aroused. Mental status is at baseline.   Psychiatric:         Mood and Affect: Mood normal.         Judgment: Judgment normal.        Results Review:  I have reviewed the labs, radiology results, and diagnostic studies.    Laboratory Data:   Results from last 7 days   Lab Units 02/03/24  0340 02/02/24  0257 02/01/24  0432   WBC 10*3/mm3 12.83* 12.06* 17.88*   HEMOGLOBIN g/dL 6.6* 7.7* 7.8*   HEMATOCRIT % 20.2* 23.4* 23.4*   PLATELETS 10*3/mm3 34* 33* 36*        Results from last 7 days   Lab Units 02/03/24  0340 02/02/24  0835 02/02/24  0257 02/01/24  0432   SODIUM mmol/L 132* 136 136 134*   POTASSIUM mmol/L 3.4* 5.3*  5.3* 2.9* 2.8*   CHLORIDE mmol/L 107 108* 106 103   CO2 mmol/L 14.0* 13.0* 18.0* 18.0*   BUN mg/dL 66* 64* 62* 55*   CREATININE mg/dL 2.18* 2.25* 2.17* 1.67*   CALCIUM mg/dL 7.0* 7.3* 7.2* 7.3*   BILIRUBIN mg/dL 0.7  --  1.3* 2.3*   ALK PHOS U/L 200*  --  199* 201*   ALT (SGPT) U/L 15  --  19 20   AST (SGOT) U/L 15  --  23 26   GLUCOSE mg/dL 107* 106* 91 118*       Culture Data:   Blood Culture   Date Value Ref Range Status    02/02/2024 Staphylococcus aureus, MRSA (C)  Final     Comment:       Methicillin resistant Staphylococcus aureus, Patient may be an isolation risk.   02/01/2024 Staphylococcus aureus, MRSA (C)  Final     Comment:       Methicillin resistant Staphylococcus aureus, Patient may be an isolation risk.   01/30/2024 Staphylococcus aureus, MRSA (C)  Final     Comment:       Infectious disease consultation is highly recommended to rule out distant foci of infection.  Methicillin resistant Staphylococcus aureus, Patient may be an isolation risk.   01/30/2024 Staphylococcus aureus, MRSA (C)  Final     Comment:       Infectious disease consultation is highly recommended to rule out distant foci of infection.  Methicillin resistant Staphylococcus aureus, Patient may be an isolation risk.       Radiology Data:   Imaging Results (Last 24 Hours)       ** No results found for the last 24 hours. **            I have reviewed the patient's current medications.     Assessment/Plan   Assessment  Active Hospital Problems    Diagnosis     **Sepsis associated hypotension     MRSA bacteremia     Methamphetamine use     Hyponatremia     Anemia     Thrombocytopenia     Tricuspid valve vegetation     Bacterial endocarditis     DARRYN (acute kidney injury)     Chronic hepatitis C without hepatic coma        Treatment Plan  Decrease IV normal saline to 50 cc/h  Nutrition consultation regarding malnourishment and low albumin levels  Agree with PRBC transfusion  Continue serial labs  Check C. difficile toxin    Medical Decision Making  Number and Complexity of problems:   Sepsis with hypotension, acute, high complexity  Bacterial endocarditis with tricuspid vegetation and bacteremia, acute, high complexity, life-threatening  Hepatitis C, chronic, high complexity  Acute kidney injury, acute, moderate complexity  Thrombocytopenia, acute, moderate complexity  Hyponatremia, acute, moderate complexity  Anemia, chronic, moderate complexity  Intravenous  methamphetamine abuse, chronic, high complexity     Differential Diagnosis:   None others considered at present     Conditions and Status        Condition is unchanged.     [facility] Data  External documents reviewed: Care Everywhere documentation  Cardiac tracing (EKG, telemetry) interpretation: See HPI  Radiology interpretation: See HPI  Labs reviewed: See HPI  Any tests that were considered but not ordered: None     Decision rules/scores evaluated (example [id number], Wells, etc): None     Discussed with: The patient, cardiology and infectious diseases     Care Planning  Shared decision making: All services consulted  Code status and discussions: Full code     Disposition  Social Determinants of Health that impact treatment or disposition: IV drug abuse  I expect the patient to be discharged to ? in ? days.    Electronically signed by Kevin Ramirez DO, 02/03/24, 10:59 CST.    I spent 45 minutes providing critical care management to this patient. This excludes time spent in performing separately billed procedures.

## 2024-02-04 ENCOUNTER — APPOINTMENT (OUTPATIENT)
Dept: CT IMAGING | Facility: HOSPITAL | Age: 34
DRG: 871 | End: 2024-02-04
Payer: COMMERCIAL

## 2024-02-04 VITALS
SYSTOLIC BLOOD PRESSURE: 105 MMHG | BODY MASS INDEX: 34.96 KG/M2 | DIASTOLIC BLOOD PRESSURE: 63 MMHG | HEART RATE: 95 BPM | TEMPERATURE: 97.5 F | RESPIRATION RATE: 34 BRPM | OXYGEN SATURATION: 97 % | HEIGHT: 63 IN | WEIGHT: 197.3 LBS

## 2024-02-04 LAB
ALBUMIN SERPL-MCNC: 2 G/DL (ref 3.5–5.2)
ALBUMIN/GLOB SERPL: 0.6 G/DL
ALP SERPL-CCNC: 147 U/L (ref 39–117)
ALT SERPL W P-5'-P-CCNC: 13 U/L (ref 1–33)
ANION GAP SERPL CALCULATED.3IONS-SCNC: 11 MMOL/L (ref 5–15)
AST SERPL-CCNC: 13 U/L (ref 1–32)
BASOPHILS # BLD AUTO: 0.01 10*3/MM3 (ref 0–0.2)
BASOPHILS NFR BLD AUTO: 0.1 % (ref 0–1.5)
BH BB BLOOD EXPIRATION DATE: NORMAL
BH BB BLOOD TYPE BARCODE: 5100
BH BB DISPENSE STATUS: NORMAL
BH BB PRODUCT CODE: NORMAL
BH BB UNIT NUMBER: NORMAL
BILIRUB SERPL-MCNC: 0.6 MG/DL (ref 0–1.2)
BUN SERPL-MCNC: 70 MG/DL (ref 6–20)
BUN/CREAT SERPL: 28.7 (ref 7–25)
CALCIUM SPEC-SCNC: 7.3 MG/DL (ref 8.6–10.5)
CHLORIDE SERPL-SCNC: 107 MMOL/L (ref 98–107)
CO2 SERPL-SCNC: 16 MMOL/L (ref 22–29)
CREAT SERPL-MCNC: 2.44 MG/DL (ref 0.57–1)
CROSSMATCH INTERPRETATION: NORMAL
DEPRECATED RDW RBC AUTO: 46.1 FL (ref 37–54)
EGFRCR SERPLBLD CKD-EPI 2021: 26 ML/MIN/1.73
EOSINOPHIL # BLD AUTO: 0.11 10*3/MM3 (ref 0–0.4)
EOSINOPHIL NFR BLD AUTO: 0.9 % (ref 0.3–6.2)
ERYTHROCYTE [DISTWIDTH] IN BLOOD BY AUTOMATED COUNT: 15.9 % (ref 12.3–15.4)
GLOBULIN UR ELPH-MCNC: 3.3 GM/DL
GLUCOSE SERPL-MCNC: 92 MG/DL (ref 65–99)
HCT VFR BLD AUTO: 23.1 % (ref 34–46.6)
HGB BLD-MCNC: 7.5 G/DL (ref 12–15.9)
IMM GRANULOCYTES # BLD AUTO: 0.23 10*3/MM3 (ref 0–0.05)
IMM GRANULOCYTES NFR BLD AUTO: 2 % (ref 0–0.5)
LYMPHOCYTES # BLD AUTO: 1.65 10*3/MM3 (ref 0.7–3.1)
LYMPHOCYTES NFR BLD AUTO: 14.1 % (ref 19.6–45.3)
MCH RBC QN AUTO: 26.1 PG (ref 26.6–33)
MCHC RBC AUTO-ENTMCNC: 32.5 G/DL (ref 31.5–35.7)
MCV RBC AUTO: 80.5 FL (ref 79–97)
MONOCYTES # BLD AUTO: 0.6 10*3/MM3 (ref 0.1–0.9)
MONOCYTES NFR BLD AUTO: 5.1 % (ref 5–12)
NEUTROPHILS NFR BLD AUTO: 77.8 % (ref 42.7–76)
NEUTROPHILS NFR BLD AUTO: 9.11 10*3/MM3 (ref 1.7–7)
NRBC BLD AUTO-RTO: 0 /100 WBC (ref 0–0.2)
PLATELET # BLD AUTO: 57 10*3/MM3 (ref 140–450)
PMV BLD AUTO: 11.8 FL (ref 6–12)
POTASSIUM SERPL-SCNC: 3.5 MMOL/L (ref 3.5–5.2)
PROT SERPL-MCNC: 5.3 G/DL (ref 6–8.5)
RBC # BLD AUTO: 2.87 10*6/MM3 (ref 3.77–5.28)
SODIUM SERPL-SCNC: 134 MMOL/L (ref 136–145)
UNIT  ABO: NORMAL
UNIT  RH: NORMAL
WBC NRBC COR # BLD AUTO: 11.71 10*3/MM3 (ref 3.4–10.8)

## 2024-02-04 PROCEDURE — 85025 COMPLETE CBC W/AUTO DIFF WBC: CPT | Performed by: FAMILY MEDICINE

## 2024-02-04 PROCEDURE — 87040 BLOOD CULTURE FOR BACTERIA: CPT | Performed by: INTERNAL MEDICINE

## 2024-02-04 PROCEDURE — 99233 SBSQ HOSP IP/OBS HIGH 50: CPT | Performed by: INTERNAL MEDICINE

## 2024-02-04 PROCEDURE — 25810000003 SODIUM CHLORIDE 0.9 % SOLUTION 250 ML FLEX CONT: Performed by: INTERNAL MEDICINE

## 2024-02-04 PROCEDURE — 87147 CULTURE TYPE IMMUNOLOGIC: CPT | Performed by: INTERNAL MEDICINE

## 2024-02-04 PROCEDURE — 25010000002 VANCOMYCIN 1 G RECONSTITUTED SOLUTION 1 EACH VIAL: Performed by: INTERNAL MEDICINE

## 2024-02-04 PROCEDURE — 74176 CT ABD & PELVIS W/O CONTRAST: CPT

## 2024-02-04 PROCEDURE — 80053 COMPREHEN METABOLIC PANEL: CPT | Performed by: FAMILY MEDICINE

## 2024-02-04 PROCEDURE — 25010000002 DIPHENHYDRAMINE PER 50 MG: Performed by: INTERNAL MEDICINE

## 2024-02-04 PROCEDURE — 71250 CT THORAX DX C-: CPT

## 2024-02-04 PROCEDURE — 25810000003 SODIUM CHLORIDE 0.9 % SOLUTION: Performed by: FAMILY MEDICINE

## 2024-02-04 RX ORDER — FAMOTIDINE 20 MG/1
20 TABLET, FILM COATED ORAL
Status: DISCONTINUED | OUTPATIENT
Start: 2024-02-04 | End: 2024-02-04 | Stop reason: HOSPADM

## 2024-02-04 RX ADMIN — TRAMADOL HYDROCHLORIDE 50 MG: 50 TABLET, COATED ORAL at 08:50

## 2024-02-04 RX ADMIN — DIPHENHYDRAMINE HYDROCHLORIDE 25 MG: 50 INJECTION, SOLUTION INTRAMUSCULAR; INTRAVENOUS at 02:22

## 2024-02-04 RX ADMIN — DIPHENHYDRAMINE HYDROCHLORIDE AND LIDOCAINE HYDROCHLORIDE AND ALUMINUM HYDROXIDE AND MAGNESIUM HYDRO 10 ML: KIT at 10:25

## 2024-02-04 RX ADMIN — CHLORHEXIDINE GLUCONATE 1 APPLICATION: 500 CLOTH TOPICAL at 03:17

## 2024-02-04 RX ADMIN — DIPHENHYDRAMINE HYDROCHLORIDE AND LIDOCAINE HYDROCHLORIDE AND ALUMINUM HYDROXIDE AND MAGNESIUM HYDRO 10 ML: KIT at 04:18

## 2024-02-04 RX ADMIN — ACETAMINOPHEN 650 MG: 325 TABLET, FILM COATED ORAL at 01:23

## 2024-02-04 RX ADMIN — SODIUM CHLORIDE 50 ML/HR: 9 INJECTION, SOLUTION INTRAVENOUS at 06:16

## 2024-02-04 RX ADMIN — ACETAMINOPHEN 975 MG: 325 TABLET, FILM COATED ORAL at 13:07

## 2024-02-04 RX ADMIN — DIPHENHYDRAMINE HYDROCHLORIDE AND LIDOCAINE HYDROCHLORIDE AND ALUMINUM HYDROXIDE AND MAGNESIUM HYDRO 10 ML: KIT at 17:17

## 2024-02-04 RX ADMIN — TRAMADOL HYDROCHLORIDE 50 MG: 50 TABLET, COATED ORAL at 02:22

## 2024-02-04 RX ADMIN — FAMOTIDINE 20 MG: 20 TABLET, FILM COATED ORAL at 13:12

## 2024-02-04 RX ADMIN — TRAMADOL HYDROCHLORIDE 50 MG: 50 TABLET, COATED ORAL at 16:01

## 2024-02-04 RX ADMIN — Medication 1 APPLICATION: at 08:50

## 2024-02-04 RX ADMIN — Medication 500 MG: at 08:50

## 2024-02-04 RX ADMIN — VANCOMYCIN HYDROCHLORIDE 1000 MG: 1 INJECTION, POWDER, LYOPHILIZED, FOR SOLUTION INTRAVENOUS at 16:01

## 2024-02-04 RX ADMIN — Medication 10 ML: at 08:50

## 2024-02-04 NOTE — PLAN OF CARE
Goal Outcome Evaluation:  Plan of Care Reviewed With: patient        Progress: no change       Problem: Skin Injury Risk Increased  Goal: Skin Health and Integrity  Outcome: Ongoing, Not Progressing  Intervention: Optimize Skin Protection  Recent Flowsheet Documentation  Taken 2/4/2024 1600 by Delma Patrick RN  Pressure Reduction Techniques:   weight shift assistance provided   heels elevated off bed  Pressure Reduction Devices: pressure-redistributing mattress utilized  Skin Protection:   tubing/devices free from skin contact   incontinence pads utilized  Taken 2/4/2024 1157 by Delma Patrick RN  Pressure Reduction Techniques:   heels elevated off bed   weight shift assistance provided  Pressure Reduction Devices:   pressure-redistributing mattress utilized   chair cushion utilized  Skin Protection:   incontinence pads utilized   tubing/devices free from skin contact  Taken 2/4/2024 0757 by Delma Patrick RN  Pressure Reduction Techniques:   weight shift assistance provided   heels elevated off bed  Pressure Reduction Devices: pressure-redistributing mattress utilized  Skin Protection:   incontinence pads utilized   tubing/devices free from skin contact     Problem: Adult Inpatient Plan of Care  Goal: Plan of Care Review  Outcome: Ongoing, Not Progressing  Flowsheets (Taken 2/4/2024 1733)  Progress: no change  Plan of Care Reviewed With: patient  Goal: Patient-Specific Goal (Individualized)  Outcome: Ongoing, Not Progressing  Goal: Absence of Hospital-Acquired Illness or Injury  Outcome: Ongoing, Not Progressing  Intervention: Identify and Manage Fall Risk  Recent Flowsheet Documentation  Taken 2/4/2024 1700 by Delma Patrick RN  Safety Promotion/Fall Prevention: safety round/check completed  Taken 2/4/2024 1600 by Delma Patrick RN  Safety Promotion/Fall Prevention: safety round/check completed  Taken 2/4/2024 1500 by Delma Patrick RN  Safety Promotion/Fall Prevention: safety round/check completed  Taken  2/4/2024 1400 by Delma Patrick RN  Safety Promotion/Fall Prevention: safety round/check completed  Taken 2/4/2024 1252 by Delma Patrick RN  Safety Promotion/Fall Prevention: safety round/check completed  Taken 2/4/2024 1157 by Delma Patrick RN  Safety Promotion/Fall Prevention: safety round/check completed  Taken 2/4/2024 1047 by Delma Patrick RN  Safety Promotion/Fall Prevention: safety round/check completed  Taken 2/4/2024 1000 by Delma Patrick RN  Safety Promotion/Fall Prevention: safety round/check completed  Taken 2/4/2024 0850 by Delma Patrick RN  Safety Promotion/Fall Prevention: safety round/check completed  Taken 2/4/2024 0757 by Delma Patrick RN  Safety Promotion/Fall Prevention: safety round/check completed  Taken 2/4/2024 0700 by Delma Patrick RN  Safety Promotion/Fall Prevention: safety round/check completed  Intervention: Prevent Skin Injury  Recent Flowsheet Documentation  Taken 2/4/2024 1600 by Delma Patrick RN  Body Position: position changed independently  Skin Protection:   tubing/devices free from skin contact   incontinence pads utilized  Taken 2/4/2024 1157 by Delma Patrick RN  Body Position: position changed independently  Skin Protection:   incontinence pads utilized   tubing/devices free from skin contact  Taken 2/4/2024 0850 by Delma Patrick RN  Body Position: position changed independently  Taken 2/4/2024 0757 by Delma Patrick RN  Body Position: position changed independently  Skin Protection:   incontinence pads utilized   tubing/devices free from skin contact  Intervention: Prevent and Manage VTE (Venous Thromboembolism) Risk  Recent Flowsheet Documentation  Taken 2/4/2024 1600 by Delma Patrick RN  VTE Prevention/Management:   bilateral   sequential compression devices off  Taken 2/4/2024 1157 by Delma Patrick RN  Activity Management: up in chair  VTE Prevention/Management:   bilateral   sequential compression devices on  Taken 2/4/2024 0757 by Delma Patrick  RN  VTE Prevention/Management: (per patient request)   bilateral   sequential compression devices off  Goal: Optimal Comfort and Wellbeing  Outcome: Ongoing, Not Progressing  Intervention: Monitor Pain and Promote Comfort  Recent Flowsheet Documentation  Taken 2/4/2024 1601 by Delma Patrick RN  Pain Management Interventions: see MAR  Taken 2/4/2024 1307 by Delma Patrick RN  Pain Management Interventions: see MAR  Taken 2/4/2024 0850 by Delma Patrick RN  Pain Management Interventions: see MAR  Goal: Readiness for Transition of Care  Outcome: Ongoing, Not Progressing     Problem: Adjustment to Illness (Sepsis/Septic Shock)  Goal: Optimal Coping  Outcome: Ongoing, Not Progressing     Problem: Bleeding (Sepsis/Septic Shock)  Goal: Absence of Bleeding  Outcome: Ongoing, Not Progressing     Problem: Glycemic Control Impaired (Sepsis/Septic Shock)  Goal: Blood Glucose Level Within Desired Range  Outcome: Ongoing, Not Progressing     Problem: Infection Progression (Sepsis/Septic Shock)  Goal: Absence of Infection Signs and Symptoms  Outcome: Ongoing, Not Progressing  Intervention: Promote Recovery  Recent Flowsheet Documentation  Taken 2/4/2024 1157 by Delma Patrick RN  Activity Management: up in chair     Problem: Nutrition Impaired (Sepsis/Septic Shock)  Goal: Optimal Nutrition Intake  Outcome: Ongoing, Not Progressing     Problem: Fall Injury Risk  Goal: Absence of Fall and Fall-Related Injury  Outcome: Ongoing, Not Progressing  Intervention: Promote Injury-Free Environment  Recent Flowsheet Documentation  Taken 2/4/2024 1700 by Delma Patrick RN  Safety Promotion/Fall Prevention: safety round/check completed  Taken 2/4/2024 1600 by Delma Patrick RN  Safety Promotion/Fall Prevention: safety round/check completed  Taken 2/4/2024 1500 by Delma Patrick RN  Safety Promotion/Fall Prevention: safety round/check completed  Taken 2/4/2024 1400 by Delma Patrick RN  Safety Promotion/Fall Prevention: safety  round/check completed  Taken 2/4/2024 1252 by Delma Patrick RN  Safety Promotion/Fall Prevention: safety round/check completed  Taken 2/4/2024 1157 by Delma Patrick RN  Safety Promotion/Fall Prevention: safety round/check completed  Taken 2/4/2024 1047 by Delma Patrick RN  Safety Promotion/Fall Prevention: safety round/check completed  Taken 2/4/2024 1000 by Delma Patrick RN  Safety Promotion/Fall Prevention: safety round/check completed  Taken 2/4/2024 0850 by Delma Patrick RN  Safety Promotion/Fall Prevention: safety round/check completed  Taken 2/4/2024 0757 by Delma Patrick RN  Safety Promotion/Fall Prevention: safety round/check completed  Taken 2/4/2024 0700 by Delma Patrick RN  Safety Promotion/Fall Prevention: safety round/check completed

## 2024-02-04 NOTE — PROGRESS NOTES
"Pharmacy Dosing Service  Pharmacokinetics  Vancomycin Follow-up Evaluation    Assessment:  Active Hospital Problems    Diagnosis  POA    **Sepsis associated hypotension [A41.9, I95.9]  Yes    MRSA bacteremia [R78.81, B95.62]  Yes    Methamphetamine use [F15.10]  Yes    Hyponatremia [E87.1]  Yes    Anemia [D64.9]  Yes    Thrombocytopenia [D69.6]  Yes    Tricuspid valve vegetation [I33.0]  Yes    Bacterial endocarditis [I33.0]  Yes    DARRYN (acute kidney injury) [N17.9]  Yes    Chronic hepatitis C without hepatic coma [B18.2]  Yes       Current Order: Vancomycin 1000 mg IVPB every 24 hours  Indication: bacteremia/endocarditis  Current end date:3/13    AUC Model Data:  Regimen: 1000 mg IV every 24 hours.  Exposure target: AUC24 (range)400-600 mg/L.hr   AUC24,ss: 484 mg/L.hr  PAUC*: 94 %  Ctrough,ss: 15.8 mg/L  Pconc*: 11 %  Tox.: 11 %      Plan: level correlates to therapeutic exposure. Continue current dose    Clinical pharmacist following daily     Subjective:  Jaqui Kate is a 33 y.o. female currently on Vancomycin, day 4 of treatment.      Objective:  Ht: 160 cm (63\"); Wt: 84 kg (185 lb 3 oz)  Estimated Creatinine Clearance: 37.3 mL/min (A) (by C-G formula based on SCr of 2.18 mg/dL (H)).   Creatinine   Date Value Ref Range Status   02/03/2024 2.18 (H) 0.57 - 1.00 mg/dL Final   02/02/2024 2.25 (H) 0.57 - 1.00 mg/dL Final   02/02/2024 2.17 (H) 0.57 - 1.00 mg/dL Final      Lab Results   Component Value Date    WBC 12.83 (H) 02/03/2024    WBC 12.06 (H) 02/02/2024    WBC 17.88 (H) 02/01/2024         Lab Results   Component Value Date    VANCOTROUGH 16.20 02/03/2024       Culture Results:  Microbiology Results (last 10 days)       Procedure Component Value - Date/Time    Clostridioides difficile Toxin - Stool, Per Rectum [[id number]]  (Normal) Collected: 02/03/24 1122    Lab Status: Final result Specimen: Stool from Per Rectum Updated: 02/03/24 1219    Narrative:      The following orders were created for panel order " Clostridioides difficile Toxin - Stool, Per Rectum.  Procedure                               Abnormality         Status                     ---------                               -----------         ------                     Clostridioides difficile...[[id number]]  Normal              Final result                 Please view results for these tests on the individual orders.    Clostridioides difficile Toxin, PCR - Stool, Per Rectum [[id number]]  (Normal) Collected: 02/03/24 1122    Lab Status: Final result Specimen: Stool from Per Rectum Updated: 02/03/24 1219     Toxigenic C. difficile by PCR Negative    Narrative:      The result indicates the absence of toxigenic C. difficile from stool specimen.     Blood Culture With JEAN - Blood, Hand, Right [[id number]]  (Abnormal) Collected: 02/03/24 0340    Lab Status: Preliminary result Specimen: Blood from Hand, Right Updated: 02/03/24 1717     Blood Culture Abnormal Stain     Gram Stain Aerobic Bottle Gram positive cocci    Blood Culture With JEAN - Blood, Hand, Left [[id number]]  (Abnormal) Collected: 02/02/24 0257    Lab Status: Final result Specimen: Blood from Hand, Left Updated: 02/03/24 0528     Blood Culture Staphylococcus aureus, MRSA     Comment:   Methicillin resistant Staphylococcus aureus, Patient may be an isolation risk.        Isolated from Aerobic Bottle     Gram Stain Aerobic Bottle Gram positive cocci in clusters    Narrative:      Refer to previous blood culture collected on 01/31/2024 0050 for MICs.    Blood Culture With JEAN - Blood, Hand, Left [[id number]]  (Abnormal) Collected: 02/01/24 0432    Lab Status: Final result Specimen: Blood from Hand, Left Updated: 02/03/24 0528     Blood Culture Staphylococcus aureus, MRSA     Comment:   Methicillin resistant Staphylococcus aureus, Patient may be an isolation risk.        Isolated from Pediatric Bottle     Gram Stain Pediatric Bottle Gram positive cocci in clusters    Narrative:      Refer to previous blood  culture collected on 01/31/2024 0050 for MICs.      MRSA Screen, PCR (Inpatient) - Swab, Nares [[id number]]  (Abnormal) Collected: 01/31/24 1526    Lab Status: Final result Specimen: Swab from Nares Updated: 01/31/24 1651     MRSA PCR MRSA Detected    Narrative:      The negative predictive value of this diagnostic test is high and should only be used to consider de-escalating anti-MRSA therapy. A positive result may indicate colonization with MRSA and must be correlated clinically.    Blood Culture - Blood, Arm, Left [[id number]]  (Abnormal) Collected: 01/30/24 2356    Lab Status: Final result Specimen: Blood from Arm, Left Updated: 02/02/24 0505     Blood Culture Staphylococcus aureus, MRSA     Comment:   Infectious disease consultation is highly recommended to rule out distant foci of infection.  Methicillin resistant Staphylococcus aureus, Patient may be an isolation risk.        Isolated from Pediatric Bottle     Gram Stain Pediatric Bottle Gram positive cocci in clusters    Narrative:      Refer to previous blood culture collected on 01/31/2024 0050 for MICs      Blood Culture - Blood, Arm, Left [[id number]]  (Abnormal)  (Susceptibility) Collected: 01/30/24 2350    Lab Status: Final result Specimen: Blood from Arm, Left Updated: 02/02/24 0505     Blood Culture Staphylococcus aureus, MRSA     Comment:   Infectious disease consultation is highly recommended to rule out distant foci of infection.  Methicillin resistant Staphylococcus aureus, Patient may be an isolation risk.        Isolated from Pediatric Bottle     Gram Stain Pediatric Bottle Gram positive cocci in clusters    Susceptibility        Staphylococcus aureus, MRSA      LEIGH      Gentamicin <=0.5 ug/ml Susceptible      Oxacillin >=4 ug/ml Resistant      Rifampin <=0.5 ug/ml Susceptible      Vancomycin <=0.5 ug/ml Susceptible                           Blood Culture ID, PCR - Blood, Arm, Left [[id number]]  (Abnormal) Collected: 01/30/24 2350    Lab Status:  Final result Specimen: Blood from Arm, Left Updated: 01/31/24 6477     BCID, PCR Staph aureus. mecA/C and MREJ (methicillin resistance gene) detected. Identification by BCID2 PCR.     BOTTLE TYPE Pediatric Bottle    Narrative:      Infectious disease consultation is highly recommended to rule out distant foci of infection.            Joshua Paulino, PharmD   02/03/24 19:03 CST

## 2024-02-04 NOTE — PROGRESS NOTES
"Infectious Diseases Progress Note    Patient:  Jaqui Kate  YOB: 1990  MRN: [id number]   Admit date: 1/30/2024   Admitting Physician: Kevin Ramirez DO  Primary Care Physician: Vanesa Guevara APRN    Chief Complaint/Interval History: She is without fever.  Per chart respiratory rate has been high.  Observing her in the room she does not appear in distress, she is on room air, she has excellent oxygen saturation.  She does appear mildly tachypneic but is not coughing.  When I ask about pain she indicates some right-sided abdominal discomfort.  When asked about joint pain she indicates her knees hurt.  She denies any back pain.  She did not report any rash.  There were no other localizing symptoms.  Her boyfriend is at bedside.  She would tend to look at me.  She was only willing to answer questions minimally.  I explained to her her blood culture results and echocardiogram results.  Explained the encouraging finding that her most recent blood cultures are no growth to date.  Explained we have a good chance of curing her infection with ongoing appropriate treatment.  Tried to be encouraging regarding her treatment course.  Her response was, \"I am going to sign myself out.\"  I again encouraged her that with treatment I felt we could hopefully put her in a position for favorable outcome.  She again gave me the same response.  Tried to explore for any things we could do to help make her stay as easy as possible but received no input.  Discussed with nursing.  Interval note from hospitalist reviewed.  Previous thoracic surgery note reviewed.  Dr. Landry's note from yesterday reviewed.    Intake/Output Summary (Last 24 hours) at 2/4/2024 1739  Last data filed at 2/4/2024 1732  Gross per 24 hour   Intake 2907.75 ml   Output --   Net 2907.75 ml     Allergies: No Known Allergies  Current Scheduled Medications:   Chlorhexidine Gluconate Cloth, 1 Application, Topical, Q24H  famotidine, 20 mg, Oral, " "BID AC  First Mouthwash (Magic Mouthwash), 10 mL, Swish & Spit, Q6H  saccharomyces boulardii, 500 mg, Oral, BID  sodium chloride, 10 mL, Intravenous, Q12H  vancomycin, 1,000 mg, Intravenous, Q24H      sodium chloride, 50 mL/hr, Last Rate: 50 mL/hr (24 0616)       Current PRN Medications:    acetaminophen **OR** acetaminophen    acetaminophen    Calcium Replacement - Follow Nurse / BPA Driven Protocol    diphenhydrAMINE    ipratropium-albuterol    Magnesium Standard Dose Replacement - Follow Nurse / BPA Driven Protocol    nitroglycerin    ondansetron    Phosphorus Replacement - Follow Nurse / BPA Driven Protocol    Potassium Replacement - Follow Nurse / BPA Driven Protocol    sodium chloride    sodium chloride    traMADol    Review of Systems see HPI    Vital Signs:  Temp (24hrs), Av.1 °F (36.7 °C), Min:97.5 °F (36.4 °C), Max:98.6 °F (37 °C)    /63   Pulse 95   Temp 97.5 °F (36.4 °C) (Oral)   Resp (!) 34   Ht 160 cm (63\")   Wt 89.5 kg (197 lb 4.8 oz)   LMP 2024 (Exact Date)   SpO2 97%   BMI 34.95 kg/m²     Physical Exam  Vital signs - reviewed.  Line/IV site - No erythema, warmth, induration, or tenderness.  Heart with faint systolic murmur along left sternal border  Lungs clear without wheezing  Rare coarse crackle  Abdomen soft.  There was not significant right-sided tenderness.  Bowel sounds present.  No mass, guarding, or rebound.  Extremities 2+ to 3+ bilateral lower extremity edema which is symmetrical  Skin without rash  She seems to be moving all extremities equally    Lab Results:  CBC:   Results from last 7 days   Lab Units 24  0355 24  0340 24  0257 24  0432 24  0409 24  2350   WBC 10*3/mm3 11.71* 12.83* 12.06* 17.88* 21.12* 27.09*   HEMOGLOBIN g/dL 7.5* 6.6* 7.7* 7.8* 7.7* 9.1*   HEMATOCRIT % 23.1* 20.2* 23.4* 23.4* 22.8* 27.5*   PLATELETS 10*3/mm3 57* 34* 33* 36* 41* 46*     BMP:  Results from last 7 days   Lab Units 24  0355 " 02/03/24  0340 02/02/24  0835 02/02/24  0257 02/01/24  0432 01/31/24  0409 01/30/24  2350   SODIUM mmol/L 134* 132* 136 136 134* 132* 128*   POTASSIUM mmol/L 3.5 3.4* 5.3*  5.3* 2.9* 2.8* 3.3* 3.4*   CHLORIDE mmol/L 107 107 108* 106 103 97* 92*   CO2 mmol/L 16.0* 14.0* 13.0* 18.0* 18.0* 18.0* 17.0*   BUN mg/dL 70* 66* 64* 62* 55* 58* 59*   CREATININE mg/dL 2.44* 2.18* 2.25* 2.17* 1.67* 1.86* 2.35*   GLUCOSE mg/dL 92 107* 106* 91 118* 97 73   CALCIUM mg/dL 7.3* 7.0* 7.3* 7.2* 7.3* 7.1* 7.8*   ALT (SGPT) U/L 13 15  --  19 20 27 32     Pharmacy vancomycin dosing recommendations reviewed:  AUC Model Data:  Regimen: 1000 mg IV every 24 hours.  Exposure target: AUC24 (range)400-600 mg/L.hr   AUC24,ss: 510 mg/L.hr  PAUC*: 97 %  Ctrough,ss: 17.1 mg/L  Pconc*: 20 %  Tox.: 13 %        Plan: monitor serum creatinine and UOP closely. Notify Pharmacist for change in patient condition. Continue current dose for today    Culture Results:   Blood Culture   Date Value Ref Range Status   02/04/2024 No growth at less than 24 hours  Preliminary   02/03/2024 Abnormal Stain (C)  Preliminary   02/02/2024 Staphylococcus aureus, MRSA (C)  Final     Comment:       Methicillin resistant Staphylococcus aureus, Patient may be an isolation risk.   02/01/2024 Staphylococcus aureus, MRSA (C)  Final     Comment:       Methicillin resistant Staphylococcus aureus, Patient may be an isolation risk.   01/30/2024 Staphylococcus aureus, MRSA (C)  Final     Comment:       Infectious disease consultation is highly recommended to rule out distant foci of infection.  Methicillin resistant Staphylococcus aureus, Patient may be an isolation risk.   01/30/2024 Staphylococcus aureus, MRSA (C)  Final     Comment:       Infectious disease consultation is highly recommended to rule out distant foci of infection.  Methicillin resistant Staphylococcus aureus, Patient may be an isolation risk.     Susceptibility   Staphylococcus aureus, MRSA     LEIGH     Gentamicin  <=0.5 ug/ml Susceptible     Oxacillin >=4 ug/ml Resistant     Rifampin <=0.5 ug/ml Susceptible     Vancomycin <=0.5 ug/ml Susceptible               Radiology:     CT scan of the chest without contrast done today was personally reviewed.  She has patchy bilateral infiltrates.  Some are nodular.  There appears to be some early cavitation.  She has moderate bilateral pleural effusions.  Selective cut demonstrated below.  IMPRESSION:  1. Multiple areas of consolidation within both lungs. This includes both  areas of groundglass nodular consolidation as well as nodular airspace  consolidation some of which demonstrates early cavitation as well as air  bronchograms. Given the distribution and appearance I'm suspicious that  this represents changes of septic embolization. Moderate bilateral  pleural effusions are present with compressive atelectasis in the lung  bases. There is more confluent consolidation and pneumonia in the  anterior basilar segments of the left lower lobe.  2. Trace free fluid in the upper abdomen in the perihepatic space.  3. Enlarged axillary and mediastinal nodes. These may be reactive in  nature.  4. No evidence of cardiac enlargement or pericardial effusion.        CT scan of the abdomen and pelvis without contrast done today personally reviewed.  IMPRESSION:  1. Small pleural effusions, small amount of ascites, and moderate body  wall edema. Correlate with urinary function.  2. Swollen appearance of the RIGHT kidney. Correlate with urinalysis to  assess for pyelonephritis. No hydronephrosis.      Additional Studies Reviewed:  2D echocardiogram 1/31/2024:    Left ventricular systolic function is normal. Left ventricular ejection fraction appears to be 61 - 65%.    Left ventricular diastolic function was normal.    Estimated right ventricular systolic pressure from tricuspid regurgitation is normal (<35 mmHg).    There is a (20 mm) mass on the tricuspid valve that is consistent with a  vegetation.    Impression:   1.  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  Hopefully blood MRSA bloodstream infection-hopefully blood cultures show signs of clearing  3.  Leukocytosis-improved from earlier in admission.  Stable from yesterday.  4.  Acute kidney injury-creatinine 2.4 today.  Up from 2.2 yesterday and 1.7 a few days ago  5.  Probable septic pulmonary emboli in the lungs  6.  History of injection drug use-methamphetamine  7.  Hepatitis C-untreated  8.  Thrombocytopenia-no signs of bleeding    Recommendations:   Continue vancomycin  Continue to monitor for signs/symptoms suggestive of metastatic focus of staph infection  Surgery a potential recommendation if blood cultures fail to clear  Did my best to offer support and encourage her to remain in the hospital and continue to pursue aggressive treatment for her infection and her other multiple medical problems  Monitor renal function closely and dose adjust medicines as appropriate  Continue supportive care  Continue to follow    Yash Car MD

## 2024-02-04 NOTE — PLAN OF CARE
Problem: Skin Injury Risk Increased  Goal: Skin Health and Integrity  Outcome: Ongoing, Progressing  Intervention: Optimize Skin Protection  Recent Flowsheet Documentation  Taken 2/4/2024 0000 by Costa Osorio RN  Head of Bed (HOB) Positioning: HOB at 20-30 degrees  Taken 2/3/2024 2020 by Costa Osorio RN  Pressure Reduction Techniques: frequent weight shift encouraged  Head of Bed (HOB) Positioning: HOB at 30 degrees  Pressure Reduction Devices: pressure-redistributing mattress utilized  Skin Protection: incontinence pads utilized     Problem: Adult Inpatient Plan of Care  Goal: Plan of Care Review  Outcome: Ongoing, Progressing  Goal: Patient-Specific Goal (Individualized)  Outcome: Ongoing, Progressing  Goal: Absence of Hospital-Acquired Illness or Injury  Outcome: Ongoing, Progressing  Intervention: Identify and Manage Fall Risk  Recent Flowsheet Documentation  Taken 2/4/2024 0600 by Costa Osorio RN  Safety Promotion/Fall Prevention: safety round/check completed  Taken 2/4/2024 0500 by Costa Osorio RN  Safety Promotion/Fall Prevention: safety round/check completed  Taken 2/4/2024 0400 by Costa Osorio RN  Safety Promotion/Fall Prevention: safety round/check completed  Taken 2/4/2024 0300 by Costa Osorio RN  Safety Promotion/Fall Prevention: safety round/check completed  Taken 2/4/2024 0200 by Costa Osorio RN  Safety Promotion/Fall Prevention: safety round/check completed  Taken 2/4/2024 0100 by Costa Osorio RN  Safety Promotion/Fall Prevention: safety round/check completed  Taken 2/4/2024 0000 by Costa Osorio RN  Safety Promotion/Fall Prevention: safety round/check completed  Taken 2/3/2024 2300 by Costa Osorio RN  Safety Promotion/Fall Prevention: safety round/check completed  Taken 2/3/2024 2200 by Costa Osorio RN  Safety Promotion/Fall Prevention: safety round/check completed  Taken 2/3/2024 2100 by Costa Osorio RN  Safety Promotion/Fall Prevention: safety round/check completed  Taken 2/3/2024 2020  by York, Skelton, RN  Safety Promotion/Fall Prevention: safety round/check completed  Intervention: Prevent Skin Injury  Recent Flowsheet Documentation  Taken 2/4/2024 0400 by Costa Osorio RN  Body Position: position changed independently  Taken 2/4/2024 0000 by Costa Osorio RN  Body Position:   position changed independently   turned   left  Taken 2/3/2024 2020 by Costa Osorio RN  Body Position: position changed independently  Skin Protection: incontinence pads utilized  Intervention: Prevent and Manage VTE (Venous Thromboembolism) Risk  Recent Flowsheet Documentation  Taken 2/4/2024 0400 by Costa Osorio RN  VTE Prevention/Management:   bilateral   sequential compression devices on  Taken 2/4/2024 0000 by Costa Osorio RN  Activity Management: ambulated in room  VTE Prevention/Management:   bilateral   sequential compression devices on  Taken 2/3/2024 2020 by Costa Osorio RN  Activity Management: bedrest  VTE Prevention/Management:   bilateral   sequential compression devices on  Goal: Optimal Comfort and Wellbeing  Outcome: Ongoing, Progressing  Intervention: Monitor Pain and Promote Comfort  Recent Flowsheet Documentation  Taken 2/4/2024 0222 by Costa Osorio RN  Pain Management Interventions: see MAR  Taken 2/3/2024 2100 by Costa Osorio RN  Pain Management Interventions: position adjusted  Taken 2/3/2024 2020 by Costa Osorio RN  Pain Management Interventions: see MAR  Intervention: Provide Person-Centered Care  Recent Flowsheet Documentation  Taken 2/4/2024 0400 by Costa Osorio RN  Trust Relationship/Rapport: care explained  Taken 2/4/2024 0000 by Costa Osorio RN  Trust Relationship/Rapport: care explained  Taken 2/3/2024 2020 by Costa Osorio RN  Trust Relationship/Rapport: care explained  Goal: Readiness for Transition of Care  Outcome: Ongoing, Progressing     Problem: Adjustment to Illness (Sepsis/Septic Shock)  Goal: Optimal Coping  Outcome: Ongoing, Progressing     Problem: Bleeding (Sepsis/Septic  Shock)  Goal: Absence of Bleeding  Outcome: Ongoing, Progressing     Problem: Glycemic Control Impaired (Sepsis/Septic Shock)  Goal: Blood Glucose Level Within Desired Range  Outcome: Ongoing, Progressing  Intervention: Optimize Glycemic Control  Recent Flowsheet Documentation  Taken 2/3/2024 2020 by Costa Osorio RN  Glycemic Management: blood glucose monitored     Problem: Infection Progression (Sepsis/Septic Shock)  Goal: Absence of Infection Signs and Symptoms  Outcome: Ongoing, Progressing  Intervention: Promote Recovery  Recent Flowsheet Documentation  Taken 2/4/2024 0000 by Costa Osorio RN  Activity Management: ambulated in room  Taken 2/3/2024 2020 by Costa Osorio RN  Activity Management: bedrest     Problem: Nutrition Impaired (Sepsis/Septic Shock)  Goal: Optimal Nutrition Intake  Outcome: Ongoing, Progressing     Problem: Fall Injury Risk  Goal: Absence of Fall and Fall-Related Injury  Outcome: Ongoing, Progressing  Intervention: Identify and Manage Contributors  Recent Flowsheet Documentation  Taken 2/3/2024 2020 by Costa Osorio RN  Self-Care Promotion: independence encouraged  Intervention: Promote Injury-Free Environment  Recent Flowsheet Documentation  Taken 2/4/2024 0600 by Costa Osorio RN  Safety Promotion/Fall Prevention: safety round/check completed  Taken 2/4/2024 0500 by Costa Osorio RN  Safety Promotion/Fall Prevention: safety round/check completed  Taken 2/4/2024 0400 by Costa Osorio RN  Safety Promotion/Fall Prevention: safety round/check completed  Taken 2/4/2024 0300 by Costa Osorio RN  Safety Promotion/Fall Prevention: safety round/check completed  Taken 2/4/2024 0200 by Costa Osorio RN  Safety Promotion/Fall Prevention: safety round/check completed  Taken 2/4/2024 0100 by Costa Osorio RN  Safety Promotion/Fall Prevention: safety round/check completed  Taken 2/4/2024 0000 by Costa Osorio RN  Safety Promotion/Fall Prevention: safety round/check completed  Taken 2/3/2024 2300 by  York, Skelton, RN  Safety Promotion/Fall Prevention: safety round/check completed  Taken 2/3/2024 2200 by Costa Osorio, RN  Safety Promotion/Fall Prevention: safety round/check completed  Taken 2/3/2024 2100 by Costa Osorio, RN  Safety Promotion/Fall Prevention: safety round/check completed  Taken 2/3/2024 2020 by Costa Osorio, RN  Safety Promotion/Fall Prevention: safety round/check completed   Goal Outcome Evaluation:   Afebrile overnight. A&Ox4. Remains off levophed bp stable. NSR 90's. UOP adequate. Continuous to have frequent urination and 4 bm's overnight.

## 2024-02-04 NOTE — PROGRESS NOTES
"Pharmacy Dosing Service  Pharmacokinetics  Vancomycin Follow-up Evaluation    Assessment:  Active Hospital Problems    Diagnosis  POA    **Sepsis associated hypotension [A41.9, I95.9]  Yes    MRSA bacteremia [R78.81, B95.62]  Yes    Methamphetamine use [F15.10]  Yes    Hyponatremia [E87.1]  Yes    Anemia [D64.9]  Yes    Thrombocytopenia [D69.6]  Yes    Tricuspid valve vegetation [I33.0]  Yes    Bacterial endocarditis [I33.0]  Yes    DARRYN (acute kidney injury) [N17.9]  Yes    Chronic hepatitis C without hepatic coma [B18.2]  Yes       Current Order: Vancomycin 1000 mg IVPB every 24 hours  Indication: bacteremia, endocarditis, MRSA  Current end date:3/13    Levels/Previous evaluations:   Trough yesterday was 16.2 - correlated to target exposure    Other antimicrobials and/or additional factors considered in dosing methods:   Variable serum creatinine, (goes up and down). High risk situation due to this    AUC Model Data:  Regimen: 1000 mg IV every 24 hours.  Exposure target: AUC24 (range)400-600 mg/L.hr   AUC24,ss: 510 mg/L.hr  PAUC*: 97 %  Ctrough,ss: 17.1 mg/L  Pconc*: 20 %  Tox.: 13 %      Plan: monitor serum creatinine and UOP closely. Notify Pharmacist for change in patient condition. Continue current dose for today    Clinical pharmacist following daily     Subjective:  Jaqui Kate is a 33 y.o. female currently on Vancomycin, day 5 of 42 of treatment.      Objective:  Ht: 160 cm (63\"); Wt: 89.5 kg (197 lb 4.8 oz)  Estimated Creatinine Clearance: 34.5 mL/min (A) (by C-G formula based on SCr of 2.44 mg/dL (H)).   Creatinine   Date Value Ref Range Status   02/04/2024 2.44 (H) 0.57 - 1.00 mg/dL Final   02/03/2024 2.18 (H) 0.57 - 1.00 mg/dL Final   02/02/2024 2.25 (H) 0.57 - 1.00 mg/dL Final      Lab Results   Component Value Date    WBC 11.71 (H) 02/04/2024    WBC 12.83 (H) 02/03/2024    WBC 12.06 (H) 02/02/2024         Lab Results   Component Value Date    VANCOTROUGH 16.20 02/03/2024       Culture " Results:  Microbiology Results (last 10 days)       Procedure Component Value - Date/Time    Clostridioides difficile Toxin - Stool, Per Rectum [[id number]]  (Normal) Collected: 02/03/24 1122    Lab Status: Final result Specimen: Stool from Per Rectum Updated: 02/03/24 1219    Narrative:      The following orders were created for panel order Clostridioides difficile Toxin - Stool, Per Rectum.  Procedure                               Abnormality         Status                     ---------                               -----------         ------                     Clostridioides difficile...[[id number]]  Normal              Final result                 Please view results for these tests on the individual orders.    Clostridioides difficile Toxin, PCR - Stool, Per Rectum [[id number]]  (Normal) Collected: 02/03/24 1122    Lab Status: Final result Specimen: Stool from Per Rectum Updated: 02/03/24 1219     Toxigenic C. difficile by PCR Negative    Narrative:      The result indicates the absence of toxigenic C. difficile from stool specimen.     Blood Culture With JEAN - Blood, Hand, Right [[id number]]  (Abnormal) Collected: 02/03/24 0340    Lab Status: Preliminary result Specimen: Blood from Hand, Right Updated: 02/03/24 1717     Blood Culture Abnormal Stain     Gram Stain Aerobic Bottle Gram positive cocci    Blood Culture With JEAN - Blood, Hand, Left [[id number]]  (Abnormal) Collected: 02/02/24 0257    Lab Status: Final result Specimen: Blood from Hand, Left Updated: 02/03/24 0528     Blood Culture Staphylococcus aureus, MRSA     Comment:   Methicillin resistant Staphylococcus aureus, Patient may be an isolation risk.        Isolated from Aerobic Bottle     Gram Stain Aerobic Bottle Gram positive cocci in clusters    Narrative:      Refer to previous blood culture collected on 01/31/2024 0050 for MICs.    Blood Culture With JEAN - Blood, Hand, Left [[id number]]  (Abnormal) Collected: 02/01/24 0432    Lab Status: Final  result Specimen: Blood from Hand, Left Updated: 02/03/24 0528     Blood Culture Staphylococcus aureus, MRSA     Comment:   Methicillin resistant Staphylococcus aureus, Patient may be an isolation risk.        Isolated from Pediatric Bottle     Gram Stain Pediatric Bottle Gram positive cocci in clusters    Narrative:      Refer to previous blood culture collected on 01/31/2024 0050 for MICs.      MRSA Screen, PCR (Inpatient) - Swab, Nares [[id number]]  (Abnormal) Collected: 01/31/24 1526    Lab Status: Final result Specimen: Swab from Nares Updated: 01/31/24 1651     MRSA PCR MRSA Detected    Narrative:      The negative predictive value of this diagnostic test is high and should only be used to consider de-escalating anti-MRSA therapy. A positive result may indicate colonization with MRSA and must be correlated clinically.    Blood Culture - Blood, Arm, Left [[id number]]  (Abnormal) Collected: 01/30/24 2356    Lab Status: Final result Specimen: Blood from Arm, Left Updated: 02/02/24 0505     Blood Culture Staphylococcus aureus, MRSA     Comment:   Infectious disease consultation is highly recommended to rule out distant foci of infection.  Methicillin resistant Staphylococcus aureus, Patient may be an isolation risk.        Isolated from Pediatric Bottle     Gram Stain Pediatric Bottle Gram positive cocci in clusters    Narrative:      Refer to previous blood culture collected on 01/31/2024 0050 for MICs      Blood Culture - Blood, Arm, Left [[id number]]  (Abnormal)  (Susceptibility) Collected: 01/30/24 2350    Lab Status: Final result Specimen: Blood from Arm, Left Updated: 02/02/24 0505     Blood Culture Staphylococcus aureus, MRSA     Comment:   Infectious disease consultation is highly recommended to rule out distant foci of infection.  Methicillin resistant Staphylococcus aureus, Patient may be an isolation risk.        Isolated from Pediatric Bottle     Gram Stain Pediatric Bottle Gram positive cocci in clusters     Susceptibility        Staphylococcus aureus, MRSA      LEIGH      Gentamicin <=0.5 ug/ml Susceptible      Oxacillin >=4 ug/ml Resistant      Rifampin <=0.5 ug/ml Susceptible      Vancomycin <=0.5 ug/ml Susceptible                           Blood Culture ID, PCR - Blood, Arm, Left [[id number]]  (Abnormal) Collected: 01/30/24 5266    Lab Status: Final result Specimen: Blood from Arm, Left Updated: 01/31/24 1358     BCID, PCR Staph aureus. mecA/C and MREJ (methicillin resistance gene) detected. Identification by BCID2 PCR.     BOTTLE TYPE Pediatric Bottle    Narrative:      Infectious disease consultation is highly recommended to rule out distant foci of infection.            Joshua Paulino, PharmD   02/04/24 09:09 CST

## 2024-02-04 NOTE — PROGRESS NOTES
[facility] Medicine Services  INPATIENT PROGRESS NOTE    Patient Name: Jaqui Kate  Date of Admission: 1/30/2024  Today's Date: 02/04/24  Length of Stay: 5  Primary Care Physician: Vanesa Guevara APRN    Subjective   Chief Complaint: Chest discomfort, arm and hand swelling  HPI     The patient complains of some acid reflux today.  She typically takes omeprazole at home and will be started on Pepcid while here.  Blood cultures remain persistently positive for MRSA.  There remains concern for metastatic focus of infection.  CT scan of the chest, abdomen and pelvis will be ordered.  Hemoglobin has improved to 7.5 after 1 unit transfusion.  White blood cell count 11,700.  Fattening is higher at 2.44.  Albumin 2.0.  The patient is sitting in a chair at bedside eating lunch.  Maximum temperature over the past 24 hours 98.6.  Heart rate 105.    Review of Systems   All pertinent negatives and positives are as above. All other systems have been reviewed and are negative unless otherwise stated.     Objective    Temp:  [97.9 °F (36.6 °C)-98.6 °F (37 °C)] 98.6 °F (37 °C)  Heart Rate:  [] 105  Resp:  [31-45] 36  BP: (107-128)/(63-83) 123/77  Physical Exam  Constitutional:       General: She is sleeping.      Appearance: She is normal weight. She is ill-appearing.   HENT:      Head: Normocephalic and atraumatic.      Right Ear: External ear normal.      Left Ear: External ear normal.      Nose: Nose normal.      Mouth: Mucous membranes are moist.      Pharynx: Oropharynx is clear.   Eyes:      General: No scleral icterus.     Conjunctiva/sclera: Conjunctivae normal.   Cardiovascular:      Rate and Rhythm: Regular rhythm. Tachycardia present.      Pulses: Normal pulses.      Heart sounds: Murmur heard.      Systolic murmur is present with a grade of 2/6.   Pulmonary:      Effort: Pulmonary effort is normal. No respiratory distress.      Breath sounds: Normal breath sounds.    Abdominal:      General: Abdomen is flat. Bowel sounds are normal.      Palpations: Abdomen is soft. There is no mass.      Tenderness: There is no abdominal tenderness.   Musculoskeletal:         General: Normal range of motion.  Both upper extremities edematous.     Right lower leg: No edema.      Left lower leg: No edema.   Skin:     General: Skin is warm and dry.      Coloration: Skin is not pale.   Neurological:      General: No focal deficit present.      Mental Status: She is oriented to person, place, and time and easily aroused. Mental status is at baseline.   Psychiatric:         Mood and Affect: Mood normal.         Judgment: Judgment normal.     Results Review:  I have reviewed the labs, radiology results, and diagnostic studies.    Laboratory Data:   Results from last 7 days   Lab Units 02/04/24  0355 02/03/24  0340 02/02/24  0257   WBC 10*3/mm3 11.71* 12.83* 12.06*   HEMOGLOBIN g/dL 7.5* 6.6* 7.7*   HEMATOCRIT % 23.1* 20.2* 23.4*   PLATELETS 10*3/mm3 57* 34* 33*        Results from last 7 days   Lab Units 02/04/24  0355 02/03/24  0340 02/02/24  0835 02/02/24  0257   SODIUM mmol/L 134* 132* 136 136   POTASSIUM mmol/L 3.5 3.4* 5.3*  5.3* 2.9*   CHLORIDE mmol/L 107 107 108* 106   CO2 mmol/L 16.0* 14.0* 13.0* 18.0*   BUN mg/dL 70* 66* 64* 62*   CREATININE mg/dL 2.44* 2.18* 2.25* 2.17*   CALCIUM mg/dL 7.3* 7.0* 7.3* 7.2*   BILIRUBIN mg/dL 0.6 0.7  --  1.3*   ALK PHOS U/L 147* 200*  --  199*   ALT (SGPT) U/L 13 15  --  19   AST (SGOT) U/L 13 15  --  23   GLUCOSE mg/dL 92 107* 106* 91       Culture Data:   Blood Culture   Date Value Ref Range Status   02/03/2024 Abnormal Stain (C)  Preliminary   02/02/2024 Staphylococcus aureus, MRSA (C)  Final     Comment:       Methicillin resistant Staphylococcus aureus, Patient may be an isolation risk.   02/01/2024 Staphylococcus aureus, MRSA (C)  Final     Comment:       Methicillin resistant Staphylococcus aureus, Patient may be an isolation risk.   01/30/2024  Staphylococcus aureus, MRSA (C)  Final     Comment:       Infectious disease consultation is highly recommended to rule out distant foci of infection.  Methicillin resistant Staphylococcus aureus, Patient may be an isolation risk.   01/30/2024 Staphylococcus aureus, MRSA (C)  Final     Comment:       Infectious disease consultation is highly recommended to rule out distant foci of infection.  Methicillin resistant Staphylococcus aureus, Patient may be an isolation risk.       Radiology Data:   Imaging Results (Last 24 Hours)       ** No results found for the last 24 hours. **            I have reviewed the patient's current medications.     Assessment/Plan   Assessment  Active Hospital Problems    Diagnosis     **Sepsis associated hypotension     MRSA bacteremia     Methamphetamine use     Hyponatremia     Anemia     Thrombocytopenia     Tricuspid valve vegetation     Bacterial endocarditis     DARRYN (acute kidney injury)     Chronic hepatitis C without hepatic coma        Treatment Plan  Continue IV antibiotics per infectious diseases  Continue serial blood cultures  Continue serial labs  Noncontrast CT of the chest, abdomen, pelvis    Medical Decision Making  Number and Complexity of problems:   Sepsis with hypotension, acute, high complexity  Bacterial endocarditis with tricuspid vegetation and bacteremia, acute, high complexity, life-threatening  Hepatitis C, chronic, high complexity  Acute kidney injury, acute, moderate complexity  Thrombocytopenia, acute, moderate complexity  Hyponatremia, acute, moderate complexity  Anemia, chronic, moderate complexity  Intravenous methamphetamine abuse, chronic, high complexity     Differential Diagnosis:   None others considered at present     Conditions and Status        Condition is unchanged.     MDM Data  External documents reviewed: Care Everywhere documentation  Cardiac tracing (EKG, telemetry) interpretation: See HPI  Radiology interpretation: See HPI  Labs reviewed:  See HPI  Any tests that were considered but not ordered: None     Decision rules/scores evaluated (example [id number], Wells, etc): None     Discussed with: The patient, cardiology and infectious diseases     Care Planning  Shared decision making: All services consulted  Code status and discussions: Full code     Disposition  Social Determinants of Health that impact treatment or disposition: IV drug abuse  I expect the patient to be discharged to ? in ? days.    Electronically signed by Kevin Ramirez DO, 02/04/24, 12:04 CST.

## 2024-02-05 NOTE — SIGNIFICANT NOTE
Patient decided that she wanted to leave against medical advice. Patient was advised of risk of leaving. Dr. Valentino and House Supervisor made aware. AMA paperwork signed. Patient leaving with significant other.

## 2024-02-05 NOTE — PAYOR COMM NOTE
"REF:     [id number]     [facility]  FAX  [phone]    Jaqui Farrell (33 y.o. Female)       Date of Birth   1990    Social Security Number       Address   [facility] [address]  [place] [address]    Home Phone   [phone]    MRN   [id number]       Christian   Other    Marital Status   Single                            Admission Date   1/30/24    Admission Type   Urgent    Admitting Provider   Kevin Ramirez DO    Attending Provider       Department, Room/Bed   [facility] INTENSIVE CARE, I012/1       Discharge Date   2/4/2024    Discharge Disposition   Left Against Medical Advice    Discharge Destination                                 Attending Provider: (none)   Allergies: No Known Allergies    Isolation: Contact   Infection: MRSA (01/31/24), C.difficile (rule out) (02/03/24)   Code Status: Prior    Ht: 160 cm (63\")   Wt: 89.5 kg (197 lb 4.8 oz)    Admission Cmt: None   Principal Problem: Sepsis associated hypotension [A41.9,I95.9]                   Active Insurance as of 1/30/2024       Primary Coverage       Payor Plan Insurance Group Employer/Plan Group    [employer] [place] [employer]        Payor Plan Address Payor Plan Phone Number Payor Plan Fax Number Effective Dates    [address] [address] [phone]  1/30/2024 - None Entered    [facility] [address]         Subscriber Name Subscriber Birth Date Member ID       JAQUI FARRELL 1990 [id number]                     Emergency Contacts        (Rel.) Home Phone Work Phone Mobile Phone    farrelldee dee villanueva (Mother) [phone] -- --    FarrellTre (Father) -- -- [phone]            PATIENT DISCHARGED ON  2/4/2024       "

## 2024-02-06 LAB
BACTERIA SPEC AEROBE CULT: ABNORMAL
BACTERIA SPEC AEROBE CULT: ABNORMAL
GRAM STN SPEC: ABNORMAL
ISOLATED FROM: ABNORMAL
ISOLATED FROM: ABNORMAL

## 2024-02-08 NOTE — DISCHARGE SUMMARY
"    [facility] Medicine Services  DISCHARGE SUMMARY       Date of Admission: 1/30/2024  Date of Discharge:  2/8/2024  Primary Care Physician: Vanesa Guevara APRN    Discharge Diagnoses:  Active Hospital Problems    Diagnosis     **Sepsis associated hypotension     MRSA bacteremia     Methamphetamine use     Hyponatremia     Anemia     Thrombocytopenia     Tricuspid valve vegetation     Bacterial endocarditis     DARRYN (acute kidney injury)     Chronic hepatitis C without hepatic coma          Presenting Problem/History of Present Illness:  Sepsis [A41.9]     Chief Complaint on Day of Discharge:   Chest discomfort, arm and hand swelling    History of Present Illness on Day of Discharge:   Early on the day of discharge, the patient complained of some acid reflux.  Later in the day, she began to express some dissatisfaction with being hospitalized.  When I saw her that morning, she appeared to be appropriate and tolerating treatment well.  When infectious diseases saw and evaluated the patient later in the day, she noted that she was \"going to sign herself out\".  She was encouraged that with treatment she could be placed in a position for favorable outcome.  She responded in a similar way noting that she was going to leave.  CT scan of the chest, abdomen and pelvis performed earlier that day noted areas of consolidation within both lungs and nodular areas demonstrating early cavitation suspicious for septic embolization.  CT of the abdomen and pelvis revealed a swollen appearance of the right kidney with no hydronephrosis.  Eventually, the patient signed AGAINST MEDICAL ADVICE paperwork and left the hospital at approximately 1926.    Hospital Course  33-year-old female who presents as a transfer from [facility].  Patient states that she came to the hospital because her hands or feet were swollen and she became unable to walk due to the swelling.  The patient has been " "scratching at her legs due to itching.  She has known hepatitis C, and was diagnosed a couple years ago.  She notes she has not had any treatment.  She states she has had some diarrhea.  She has had no vomiting.  She used methamphetamine about 4 days ago.  A friend told her that when she felt like this in the past that someone had put wasp spray in with the methamphetamine.  Nursing staff in the unit called poison control.  They did investigation and found that there was a new drug called \"wasp dope.\"  An active ingredient is pyrethrine and can cause headache, nausea and vomiting, tremors, redness of the skin and itching and burning.  There is no antidote or other treatment recommendations that were given at the time.  The patient notes a fever of 102 at home.  She denies any cough or nasal congestion.     Labs at transferring facility include a sodium of 126 potassium 4.3 chloride 87 CO2 19.7 BUN 55 creatinine 3.3 albumin of 1.6 ALT 47 alk phos of 185 AST of 40 total bilirubin of 2.7 lactic acid of 8.2 she rare bacteria noted in her urine.  Negative for strep INR is 1.3 positive for methamphetamine negative for flu negative for COVID white count was 23.3 hemoglobin 10.2 hematocrit 29.3 and platelets were 64.  I did not find chest x-ray.  Treatment Plan  Admit to the ICU  Vancomycin and Zosyn  IV Benadryl  Normal saline IV fluids  Albumin infusion x 1  Follow-up labs in the morning  Stat lactic acid  KUB  Poison control's been contacted  Chest x-ray  CK    I evaluated the patient on the day after admission noted that she remained quite ill and somnolent.  White blood cell count was 21,000 with hemoglobin 7.7 and a platelet count of 41,000 felt to be secondary to consumption.  Bilirubin was elevated at 2.5 with a history of untreated hepatitis C.  The patient had a strong history of IV methamphetamine abuse.  An echocardiogram was ordered in the a.m.  Cardiology contacted me to inform me that there was a large " vegetation attached to the tricuspid valve.  Blood cultures were positive for gram-positive cocci and cultures likely representing MRSA.  Sensitivities later confirmed MRSA.  The patient was placed initially on Zosyn and vancomycin until sensitivities returned and then Zosyn was subsequently discontinued.  She did receive the sepsis bolus at the [facility] hospital and aggressive IV fluid hydration continued after arrival.  Cardiothoracic surgery was consulted.  Despite aggressive IV antibiotic treatment, the patient's temperature continued to spike.  Dr. Thakkar noted that the patient would require SHANNON prior to surgical intervention and that if cultures did not clear, valvectomy would be considered.  Chest discomfort gradually improved with treatment.  Hemoglobin dropped to below 7 and the patient was transfused 1 unit PRBCs.  She complained of some GERD and was placed on Pepcid twice daily.  The patient remained on IV vancomycin throughout her hospital stay with blood cultures positive on daily blood cultures.  Because of failure to clear bacteremia, CT scan of the abdomen and pelvis was obtained with evidence of metastatic infection noted on CT scan of the chest.  As noted above, the patient eventually decided to leave AGAINST MEDICAL ADVICE despite conversation with infectious diseases prior to her declaration.  Paperwork was signed and the patient subsequently left the hospital.    Consults:   Infectious diseases:  HOSPITAL PROBLEM LIST:       Acute renal injury due to sepsis of unknown origin    Chronic hepatitis C without hepatic coma    Methamphetamine use    Hyponatremia    Anemia    Thrombocytopenia        IMPRESSION:  Sepsis with shock requiring norepinephrine.  A threat to life or bodily function.  Primary concern in this injection drug user would be bacteremia (most likely Staphylococcus) possible endocarditis and septic emboli.  Methamphetamine use-injection and snorting  Thrombocytopenia-suspect secondary  to sepsis  Acute kidney injury-creatinine improved today        RECOMMENDATION:   Continue vancomycin  Continue Zosyn  Follow-up blood cultures obtained here  Follow blood cultures from [facility]-they were collected at 1730 on January 30 and have to be sent out.  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 Ramirez, Dr. Becca Beltran and nursing  Appropriate antibiotic therapy  CT surgery has been consulted  Currently patient has swelling in her mouth  Therefore any plans for transesophageal echocardiogram if anticipated oral swelling improves  Treat and monitor hypokalemia  Treat thrombocytopenia as well as treat anemia  Continue on cardiac monitor  Will get EKG  Due to multiple comorbidities including significant abnormalities with multiple organ involvement including anemia, thrombocytopenia, leukocytosis, renal failure as well as elevated liver enzymes overall prognosis is guarded     Ham Beal MD    Cardiothoracic surgery:  Assessment & Plan    Sepsis associated hypotension    Chronic hepatitis C without hepatic coma    Methamphetamine use    Hyponatremia    Anemia    Thrombocytopenia    Tricuspid valve vegetation    Bacterial endocarditis    DARRYN (acute kidney injury)     Check prealbumin  Agree with antibiotics and will follow-up blood cultures  No plans for surgical intervention at this time from CT surgery standpoint.  Recommend ongoing antibiotics and monitoring for now.       Infectious disease  following for sepsis and antibiotic management  Hospitalist managing DARRYN, thrombocytopenia, anemia, hyponatremia     We will continue to follow along.  Please do not hesitate to call with questions or concerns.     KELLY Sadnra  Attestation signed by Toney Thakkar MD at 02/01/24 7170     I have personally seen and examined Jaqui Kate and reviewed the record. Agree with the aforementioned plan rendered jointly with Deirdre Peralta.     Ms. Kate is a 33-year-old female with history of IV drug abuse, admittedly was using IV methamphetamine about a week ago.  She was transferred from [facility] with sepsis.  Workup here revealed MRSA bacteremia and tricuspid endocarditis with septic shock.  Currently she is improving but remains critically ill tachycardic in the 140s febrile up to 102.  She is on vancomycin.  Transthoracic echo yesterday showed a 2 cm mass on the tricuspid valve consistent with vegetation.  Her white count is improving.  She is on low-dose Levophed.     Continue supportive care for now.  Needs 5 to 7 days of antibiotics to see if she clears blood cultures.  Repeat blood cultures until they remain negative.  Will need SHANNON prior to surgical intervention.  If she does not clear her cultures can consider valvectomy.  Right-sided endocarditis usually can be cleared using antibiotics though.  If no improvement in the next couple of days needs SHANNON to better assess left-sided valves.     Toney Thakkar M.D.  Cardiothoracic Surgeon       Result Review    Result Review:  I have personally reviewed the results from the time of this admission to 2/8/2024 14:25 CST and agree with these findings:  []  Laboratory  []  Microbiology  []  Radiology  []  EKG/Telemetry   []  Cardiology/Vascular   []  Pathology  []  Old records  []  Other:    Condition on Discharge:    Stable with positive blood cultures    Physical Exam on Discharge:  /63   Pulse 95   Temp 97.5 °F (36.4 °C) (Oral)   Resp (!)  "34   Ht 160 cm (63\")   Wt 89.5 kg (197 lb 4.8 oz)   LMP 01/30/2024 (Exact Date)   SpO2 97%   BMI 34.95 kg/m²   Physical Exam     Constitutional:       General: She is sleeping.      Appearance: She is normal weight. She is ill-appearing.   HENT:      Head: Normocephalic and atraumatic.      Right Ear: External ear normal.      Left Ear: External ear normal.      Nose: Nose normal.      Mouth: Mucous membranes are moist.      Pharynx: Oropharynx is clear.   Eyes:      General: No scleral icterus.     Conjunctiva/sclera: Conjunctivae normal.   Cardiovascular:      Rate and Rhythm: Regular rhythm. Tachycardia present.      Pulses: Normal pulses.      Heart sounds: Murmur heard.      Systolic murmur is present with a grade of 2/6.   Pulmonary:      Effort: Pulmonary effort is normal. No respiratory distress.      Breath sounds: Normal breath sounds.   Abdominal:      General: Abdomen is flat. Bowel sounds are normal.      Palpations: Abdomen is soft. There is no mass.      Tenderness: There is no abdominal tenderness.   Musculoskeletal:         General: Normal range of motion.  Both upper extremities edematous.     Right lower leg: No edema.      Left lower leg: No edema.   Skin:     General: Skin is warm and dry.      Coloration: Skin is not pale.   Neurological:      General: No focal deficit present.      Mental Status: She is oriented to person, place, and time and easily aroused. Mental status is at baseline.   Psychiatric:         Mood and Affect: Mood normal.         Judgment: Judgment normal.     Discharge Disposition:  Left Against Medical Advice    Discharge Medications:     Discharge Medications      Patient Not Prescribed Medications Upon Discharge       Electronically signed by Kevin Ramirez DO, 02/08/24, 14:25 CST.    Time: Discharge over 30 min    Part of this note may be an electronic transcription/translation of spoken language to printed text using the Dragon Dictation system.      "

## 2024-06-25 ENCOUNTER — HOSPITAL ENCOUNTER (OUTPATIENT)
Facility: HOSPITAL | Age: 34
Discharge: HOME | End: 2024-07-20
Attending: INTERNAL MEDICINE | Admitting: INTERNAL MEDICINE
Payer: COMMERCIAL

## 2024-06-25 PROCEDURE — 25810000003 SODIUM CHLORIDE 0.9 % SOLUTION: Performed by: INTERNAL MEDICINE

## 2024-06-25 PROCEDURE — 25010000002 HYDROMORPHONE PER 4 MG: Performed by: INTERNAL MEDICINE

## 2024-06-25 RX ORDER — SODIUM CHLORIDE 0.9 % (FLUSH) 0.9 %
10 SYRINGE (ML) INJECTION EVERY 12 HOURS SCHEDULED
Status: DISCONTINUED | OUTPATIENT
Start: 2024-06-25 | End: 2024-07-20 | Stop reason: HOSPADM

## 2024-06-25 RX ORDER — TRAZODONE HYDROCHLORIDE 100 MG/1
100 TABLET ORAL NIGHTLY
Status: DISCONTINUED | OUTPATIENT
Start: 2024-06-25 | End: 2024-07-20 | Stop reason: HOSPADM

## 2024-06-25 RX ORDER — ONDANSETRON 2 MG/ML
4 INJECTION INTRAMUSCULAR; INTRAVENOUS EVERY 6 HOURS PRN
Status: DISCONTINUED | OUTPATIENT
Start: 2024-06-25 | End: 2024-07-20 | Stop reason: HOSPADM

## 2024-06-25 RX ORDER — UREA 10 %
5 LOTION (ML) TOPICAL NIGHTLY
Status: DISCONTINUED | OUTPATIENT
Start: 2024-06-25 | End: 2024-07-20 | Stop reason: HOSPADM

## 2024-06-25 RX ORDER — LIDOCAINE 4 G/G
1 PATCH TOPICAL
Status: DISCONTINUED | OUTPATIENT
Start: 2024-06-26 | End: 2024-07-20 | Stop reason: HOSPADM

## 2024-06-25 RX ORDER — GUAIFENESIN 600 MG/1
1200 TABLET, EXTENDED RELEASE ORAL EVERY 12 HOURS PRN
Status: DISCONTINUED | OUTPATIENT
Start: 2024-06-25 | End: 2024-07-20 | Stop reason: HOSPADM

## 2024-06-25 RX ORDER — L.ACID,PARA/B.BIFIDUM/S.THERM 8B CELL
2 CAPSULE ORAL DAILY
Status: DISCONTINUED | OUTPATIENT
Start: 2024-06-26 | End: 2024-07-20 | Stop reason: HOSPADM

## 2024-06-25 RX ORDER — GABAPENTIN 300 MG/1
300 CAPSULE ORAL 3 TIMES DAILY
Status: DISCONTINUED | OUTPATIENT
Start: 2024-06-25 | End: 2024-07-20 | Stop reason: HOSPADM

## 2024-06-25 RX ORDER — DIPHENHYDRAMINE HCL 25 MG
25 CAPSULE ORAL EVERY 6 HOURS PRN
Status: DISCONTINUED | OUTPATIENT
Start: 2024-06-25 | End: 2024-07-20 | Stop reason: HOSPADM

## 2024-06-25 RX ORDER — BISACODYL 10 MG
10 SUPPOSITORY, RECTAL RECTAL DAILY PRN
Status: DISCONTINUED | OUTPATIENT
Start: 2024-06-25 | End: 2024-07-20 | Stop reason: HOSPADM

## 2024-06-25 RX ORDER — PANTOPRAZOLE SODIUM 40 MG/1
40 TABLET, DELAYED RELEASE ORAL DAILY
Status: DISCONTINUED | OUTPATIENT
Start: 2024-06-26 | End: 2024-07-20 | Stop reason: HOSPADM

## 2024-06-25 RX ORDER — OXYCODONE HYDROCHLORIDE 5 MG/1
20 TABLET ORAL EVERY 4 HOURS PRN
Status: DISCONTINUED | OUTPATIENT
Start: 2024-06-25 | End: 2024-06-28

## 2024-06-25 RX ORDER — IBUPROFEN 400 MG/1
400 TABLET ORAL EVERY 8 HOURS PRN
Status: DISCONTINUED | OUTPATIENT
Start: 2024-06-25 | End: 2024-07-03

## 2024-06-25 RX ORDER — OLANZAPINE 5 MG/1
5 TABLET ORAL EVERY 6 HOURS PRN
Status: DISCONTINUED | OUTPATIENT
Start: 2024-06-25 | End: 2024-07-20 | Stop reason: HOSPADM

## 2024-06-25 RX ORDER — AMOXICILLIN 250 MG
1 CAPSULE ORAL 2 TIMES DAILY
Status: DISCONTINUED | OUTPATIENT
Start: 2024-06-25 | End: 2024-07-20 | Stop reason: HOSPADM

## 2024-06-25 RX ORDER — ACETAMINOPHEN 325 MG/1
650 TABLET ORAL EVERY 4 HOURS PRN
Status: DISCONTINUED | OUTPATIENT
Start: 2024-06-25 | End: 2024-07-20 | Stop reason: HOSPADM

## 2024-06-25 RX ORDER — TAMSULOSIN HYDROCHLORIDE 0.4 MG/1
0.4 CAPSULE ORAL NIGHTLY
Status: DISCONTINUED | OUTPATIENT
Start: 2024-06-25 | End: 2024-07-17

## 2024-06-25 RX ORDER — SODIUM CHLORIDE 0.9 % (FLUSH) 0.9 %
10 SYRINGE (ML) INJECTION AS NEEDED
Status: DISCONTINUED | OUTPATIENT
Start: 2024-06-25 | End: 2024-07-20 | Stop reason: HOSPADM

## 2024-06-25 RX ORDER — ACETAMINOPHEN 650 MG/1
650 SUPPOSITORY RECTAL EVERY 4 HOURS PRN
Status: DISCONTINUED | OUTPATIENT
Start: 2024-06-25 | End: 2024-07-20 | Stop reason: HOSPADM

## 2024-06-25 RX ORDER — PROMETHAZINE HYDROCHLORIDE 25 MG/1
12.5 TABLET ORAL EVERY 6 HOURS PRN
Status: DISCONTINUED | OUTPATIENT
Start: 2024-06-25 | End: 2024-07-20 | Stop reason: HOSPADM

## 2024-06-25 RX ORDER — HYDROMORPHONE HYDROCHLORIDE 1 MG/ML
0.4 INJECTION, SOLUTION INTRAMUSCULAR; INTRAVENOUS; SUBCUTANEOUS EVERY 8 HOURS PRN
Status: DISCONTINUED | OUTPATIENT
Start: 2024-06-25 | End: 2024-06-28

## 2024-06-25 RX ORDER — ONDANSETRON 4 MG/1
4 TABLET, ORALLY DISINTEGRATING ORAL EVERY 6 HOURS PRN
Status: DISCONTINUED | OUTPATIENT
Start: 2024-06-25 | End: 2024-07-20 | Stop reason: HOSPADM

## 2024-06-25 RX ORDER — RIFAMPIN 300 MG/1
300 CAPSULE ORAL EVERY 12 HOURS SCHEDULED
Status: DISCONTINUED | OUTPATIENT
Start: 2024-06-25 | End: 2024-07-20 | Stop reason: HOSPADM

## 2024-06-25 RX ORDER — NICOTINE 21 MG/24HR
1 PATCH, TRANSDERMAL 24 HOURS TRANSDERMAL
Status: DISCONTINUED | OUTPATIENT
Start: 2024-06-26 | End: 2024-07-15

## 2024-06-25 RX ORDER — OXYCODONE HYDROCHLORIDE 5 MG/1
10 TABLET ORAL EVERY 4 HOURS PRN
Status: DISCONTINUED | OUTPATIENT
Start: 2024-06-25 | End: 2024-06-28

## 2024-06-25 RX ORDER — ENOXAPARIN SODIUM 100 MG/ML
40 INJECTION SUBCUTANEOUS
Status: DISCONTINUED | OUTPATIENT
Start: 2024-06-26 | End: 2024-07-20 | Stop reason: HOSPADM

## 2024-06-25 RX ORDER — BACLOFEN 10 MG/1
10 TABLET ORAL EVERY 8 HOURS PRN
Status: DISCONTINUED | OUTPATIENT
Start: 2024-06-25 | End: 2024-06-28

## 2024-06-26 LAB
ALBUMIN SERPL-MCNC: 3.4 G/DL (ref 3.5–5.2)
ALBUMIN/GLOB SERPL: 0.8 G/DL
ALP SERPL-CCNC: 202 U/L (ref 39–117)
ALT SERPL W P-5'-P-CCNC: 25 U/L (ref 1–33)
ANION GAP SERPL CALCULATED.3IONS-SCNC: 9 MMOL/L (ref 5–15)
AST SERPL-CCNC: 16 U/L (ref 1–32)
BASOPHILS # BLD AUTO: 0.03 10*3/MM3 (ref 0–0.2)
BASOPHILS NFR BLD AUTO: 0.5 % (ref 0–1.5)
BILIRUB SERPL-MCNC: 0.2 MG/DL (ref 0–1.2)
BUN SERPL-MCNC: 18 MG/DL (ref 6–20)
BUN/CREAT SERPL: 25 (ref 7–25)
CALCIUM SPEC-SCNC: 9.2 MG/DL (ref 8.6–10.5)
CHLORIDE SERPL-SCNC: 101 MMOL/L (ref 98–107)
CK SERPL-CCNC: 22 U/L (ref 20–180)
CO2 SERPL-SCNC: 28 MMOL/L (ref 22–29)
CREAT SERPL-MCNC: 0.72 MG/DL (ref 0.57–1)
DEPRECATED RDW RBC AUTO: 50.7 FL (ref 37–54)
EGFRCR SERPLBLD CKD-EPI 2021: 112.7 ML/MIN/1.73
EOSINOPHIL # BLD AUTO: 0.25 10*3/MM3 (ref 0–0.4)
EOSINOPHIL NFR BLD AUTO: 3.8 % (ref 0.3–6.2)
ERYTHROCYTE [DISTWIDTH] IN BLOOD BY AUTOMATED COUNT: 16.6 % (ref 12.3–15.4)
GLOBULIN UR ELPH-MCNC: 4.4 GM/DL
GLUCOSE SERPL-MCNC: 101 MG/DL (ref 65–99)
HCT VFR BLD AUTO: 32 % (ref 34–46.6)
HGB BLD-MCNC: 9.8 G/DL (ref 12–15.9)
IMM GRANULOCYTES # BLD AUTO: 0.03 10*3/MM3 (ref 0–0.05)
IMM GRANULOCYTES NFR BLD AUTO: 0.5 % (ref 0–0.5)
LYMPHOCYTES # BLD AUTO: 2.09 10*3/MM3 (ref 0.7–3.1)
LYMPHOCYTES NFR BLD AUTO: 31.7 % (ref 19.6–45.3)
MCH RBC QN AUTO: 26.3 PG (ref 26.6–33)
MCHC RBC AUTO-ENTMCNC: 30.6 G/DL (ref 31.5–35.7)
MCV RBC AUTO: 85.8 FL (ref 79–97)
MONOCYTES # BLD AUTO: 0.57 10*3/MM3 (ref 0.1–0.9)
MONOCYTES NFR BLD AUTO: 8.6 % (ref 5–12)
NEUTROPHILS NFR BLD AUTO: 3.62 10*3/MM3 (ref 1.7–7)
NEUTROPHILS NFR BLD AUTO: 54.9 % (ref 42.7–76)
NRBC BLD AUTO-RTO: 0 /100 WBC (ref 0–0.2)
PLATELET # BLD AUTO: 585 10*3/MM3 (ref 140–450)
PMV BLD AUTO: 8.7 FL (ref 6–12)
POTASSIUM SERPL-SCNC: 3.9 MMOL/L (ref 3.5–5.2)
PREALB SERPL-MCNC: 25.6 MG/DL (ref 20–40)
PROT SERPL-MCNC: 7.8 G/DL (ref 6–8.5)
RBC # BLD AUTO: 3.73 10*6/MM3 (ref 3.77–5.28)
SODIUM SERPL-SCNC: 138 MMOL/L (ref 136–145)
WBC NRBC COR # BLD AUTO: 6.59 10*3/MM3 (ref 3.4–10.8)

## 2024-06-26 PROCEDURE — 97530 THERAPEUTIC ACTIVITIES: CPT

## 2024-06-26 PROCEDURE — 85025 COMPLETE CBC W/AUTO DIFF WBC: CPT | Performed by: INTERNAL MEDICINE

## 2024-06-26 PROCEDURE — 25010000002 DAPTOMYCIN PER 1 MG: Performed by: INTERNAL MEDICINE

## 2024-06-26 PROCEDURE — 80053 COMPREHEN METABOLIC PANEL: CPT | Performed by: INTERNAL MEDICINE

## 2024-06-26 PROCEDURE — 25010000002 ENOXAPARIN PER 10 MG: Performed by: INTERNAL MEDICINE

## 2024-06-26 PROCEDURE — 97166 OT EVAL MOD COMPLEX 45 MIN: CPT | Performed by: OCCUPATIONAL THERAPIST

## 2024-06-26 PROCEDURE — 97535 SELF CARE MNGMENT TRAINING: CPT | Performed by: OCCUPATIONAL THERAPIST

## 2024-06-26 PROCEDURE — 82550 ASSAY OF CK (CPK): CPT | Performed by: INTERNAL MEDICINE

## 2024-06-26 PROCEDURE — 25010000002 HYDROMORPHONE PER 4 MG: Performed by: INTERNAL MEDICINE

## 2024-06-26 PROCEDURE — 97162 PT EVAL MOD COMPLEX 30 MIN: CPT

## 2024-06-26 PROCEDURE — 97530 THERAPEUTIC ACTIVITIES: CPT | Performed by: OCCUPATIONAL THERAPIST

## 2024-06-26 PROCEDURE — 84134 ASSAY OF PREALBUMIN: CPT | Performed by: INTERNAL MEDICINE

## 2024-06-26 NOTE — H&P
[facility]  RYAN Wood APRN          Internal Medicine History and Physical      Name: Jaqui Kate  MRN: [id number]     Acct: [id number]  Room: [facility]/    Admit Date: 6/25/2024  PCP: Vanesa Guevara APRN    Chief Complaint:     Need for continued IV antibiotic therapy    History Obtained From:     chart review and the patient    History of Present Illness:      Jaqui Kate is a  33 y.o.  female who presents with need for continued IV antibiotic therapy following a recent acute care stay. The patient, with a history of polysubstance abuse, including IV drug use, and MRS bacteremia with tricuspid valve endocarditis and hepatitis C, had been in her usual state of health when she developed increased weakness with worsening bilateral lower extremity weakness. When her symptoms persisted over 3 days to the point that she could not ambulate or urinate, she presented to an outlying ER with her complaints. Workup revealed evidence of t8-9 discitis with vertebral body osteomyelitis with severe anterior wedge compression deformity of both vertebral bodies. There was not spinal stenosis or evidence of cord compression, but there was a large anterior and bilateral epidural abscess extending to the mediastinum, including the pericardium and encasement of the descending aorta. She was transferred to [facility] at that time for neurosurgery evaluation and higher level of care. Cultures were obtained and she was treated with IV antibiotics. UDS positive for methamphetamine and opioids. She was seen in consultation by ID for antibiotic recommendations and management. She presented to OR on 6/10 where she underwent T6-11 posterolateral fusion with T8 posterior osteotomy and lateral extracavity corpectomy and T9 vertebral body resection with pedicle screw placement. Patient suffered no immediate intra-operative or post-operative complications. Surgical cultures returned positive  for MRSA. She had issues with post-operative pain control. She transferred to our faciltiy for continued IV antibiotic therapy and rehabilitation efforts.     Past Medical History:     Past Medical History:   Diagnosis Date    Anxiety     Depressed     GERD (gastroesophageal reflux disease)     Hepatitis C         Past Surgical History:     Past Surgical History:   Procedure Laterality Date     SECTION      x 2    TONSILLECTOMY          Medications Prior to Admission:       Prior to Admission medications    Not on File        Allergies:       Patient has no known allergies.    Social History:     Tobacco:    reports that she has been smoking cigarettes. She has never used smokeless tobacco.  Alcohol:      reports no history of alcohol use.  Drug Use:  reports current drug use. Drug: Methamphetamines.    Family History:     Family History   Problem Relation Age of Onset    Colon cancer Neg Hx     Colon polyps Neg Hx        Review of Systems:     Review of Systems   Constitutional: Positive for malaise/fatigue. Negative for chills, decreased appetite, weight gain and weight loss.   HENT:  Negative for congestion, ear discharge, hoarse voice and tinnitus.    Eyes:  Negative for blurred vision, discharge, visual disturbance and visual halos.   Cardiovascular:  Negative for chest pain, claudication, dyspnea on exertion, irregular heartbeat, leg swelling, orthopnea and paroxysmal nocturnal dyspnea.   Respiratory:  Negative for cough, shortness of breath, sputum production and wheezing.    Endocrine: Negative for cold intolerance, heat intolerance and polyuria.   Hematologic/Lymphatic: Negative for adenopathy. Does not bruise/bleed easily.   Skin:  Negative for dry skin, itching and suspicious lesions.   Musculoskeletal:  Positive for back pain, joint pain, muscle weakness and myalgias. Negative for arthritis and falls.   Gastrointestinal:  Negative for abdominal pain, constipation, diarrhea, dysphagia and  "hematemesis.   Genitourinary:  Negative for bladder incontinence, dysuria and frequency.   Neurological:  Positive for focal weakness and weakness. Negative for aphonia, disturbances in coordination and dizziness.   Psychiatric/Behavioral:  Positive for substance abuse. Negative for altered mental status, depression and memory loss. The patient is nervous/anxious. The patient does not have insomnia.        Code Status:    There are no questions and answers to display.       Physical Exam:     Vitals:  Ht 160 cm (63\")   Wt 74.8 kg (165 lb)   BMI 29.23 kg/m²   T 98.1 P 56 R 14 /87 Sp02 98% (room air)  Physical Exam  Vitals and nursing note reviewed.   Constitutional:       Appearance: Normal appearance.   HENT:      Head: Normocephalic and atraumatic.      Right Ear: External ear normal.      Left Ear: External ear normal.      Nose: Nose normal.      Mouth/Throat:      Mouth: Mucous membranes are dry.      Pharynx: Oropharynx is clear.   Eyes:      Extraocular Movements: Extraocular movements intact.      Conjunctiva/sclera: Conjunctivae normal.   Cardiovascular:      Rate and Rhythm: Regular rhythm. Bradycardia present.      Pulses: Normal pulses.      Heart sounds: Murmur heard.   Pulmonary:      Effort: Pulmonary effort is normal.      Breath sounds: Normal breath sounds.   Abdominal:      General: Bowel sounds are normal.      Palpations: Abdomen is soft.   Musculoskeletal:      Cervical back: Normal range of motion and neck supple.      Comments: Generalized weakness  BLE weakness   Skin:     Comments: Incision c.d.i   Neurological:      Mental Status: She is alert and oriented to person, place, and time.   Psychiatric:         Mood and Affect: Mood normal.         Behavior: Behavior normal.               Data:     Lab Results (last 7 days)       Procedure Component Value Units Date/Time    Comprehensive Metabolic Panel [[id number]]  (Abnormal) Collected: 06/26/24 0327    Specimen: Blood Updated: 06/26/24 " 0423     Glucose 101 mg/dL      BUN 18 mg/dL      Creatinine 0.72 mg/dL      Sodium 138 mmol/L      Potassium 3.9 mmol/L      Chloride 101 mmol/L      CO2 28.0 mmol/L      Calcium 9.2 mg/dL      Total Protein 7.8 g/dL      Albumin 3.4 g/dL      ALT (SGPT) 25 U/L      AST (SGOT) 16 U/L      Alkaline Phosphatase 202 U/L      Total Bilirubin 0.2 mg/dL      Globulin 4.4 gm/dL      A/G Ratio 0.8 g/dL      BUN/Creatinine Ratio 25.0     Anion Gap 9.0 mmol/L      eGFR 112.7 mL/min/1.73     Narrative:      GFR Normal >60  Chronic Kidney Disease <60  Kidney Failure <15      CBC & Differential [[id number]]  (Abnormal) Collected: 06/26/24 0327    Specimen: Blood Updated: 06/26/24 0401    Narrative:      The following orders were created for panel order CBC & Differential.  Procedure                               Abnormality         Status                     ---------                               -----------         ------                     CBC Auto Differential[[id number]]        Abnormal            Final result                 Please view results for these tests on the individual orders.    CBC Auto Differential [[id number]]  (Abnormal) Collected: 06/26/24 0327    Specimen: Blood Updated: 06/26/24 0401     WBC 6.59 10*3/mm3      RBC 3.73 10*6/mm3      Hemoglobin 9.8 g/dL      Hematocrit 32.0 %      MCV 85.8 fL      MCH 26.3 pg      MCHC 30.6 g/dL      RDW 16.6 %      RDW-SD 50.7 fl      MPV 8.7 fL      Platelets 585 10*3/mm3      Neutrophil % 54.9 %      Lymphocyte % 31.7 %      Monocyte % 8.6 %      Eosinophil % 3.8 %      Basophil % 0.5 %      Immature Grans % 0.5 %      Neutrophils, Absolute 3.62 10*3/mm3      Lymphocytes, Absolute 2.09 10*3/mm3      Monocytes, Absolute 0.57 10*3/mm3      Eosinophils, Absolute 0.25 10*3/mm3      Basophils, Absolute 0.03 10*3/mm3      Immature Grans, Absolute 0.03 10*3/mm3      nRBC 0.0 /100 WBC     Prealbumin [[id number]] Collected: 06/26/24 0327    Specimen: Blood Updated: 06/26/24 0349           No results found.      Assessment:           * No active hospital problems. *    Past Medical History:   Diagnosis Date    Anxiety     Depressed     GERD (gastroesophageal reflux disease)     Hepatitis C        Plan:     T8-9 MRSA discitis/osteomyelitis  Polysubstance abuse  History of triscupid valve endocarditis  Hepatitis C  Multifactorial anemia  GERD  Anxiety  Depression  Continue current treatment. Monitor counts. Increase activity. Labs in am. Continue IV antibiotics. Aggressive therapies as tolerated. Continue pain control efforts with plans to wean and DC prior to discharge. Maintain patient safety.       Electronically signed by KELLY Jones on 6/26/2024 at 10:17 CDT     Copy sent to Dr. Guevara, KELLY Dickens    I have discussed the care of Jaqui Kate, including pertinent history and exam findings, with the nurse practitioner.    I have seen and examined the patient and the key elements of all parts of the encounter have been performed by me.  I agree with the assessment, plan and orders as documented by KELLY Philip, after I modified the exam findings and the plan of treatments and the final version is my approved version of the assessment.        Electronically signed by Augustus Fish MD on 6/26/2024 at 11:11 CDT

## 2024-06-26 NOTE — CONSULTS
Inpatient Nutrition Services  Patient Name:  Jaqui Kate  YOB: 1990  MRN: [id number]  Admit Date:  6/25/2024  Assessment Date:  6/26/2024   Reason for Assessment       Row Name 06/26/24 1211          Reason for Assessment    Reason For Assessment physician consult;per organizational policy;other (see comments)  LTACH admission     Diagnosis infection/sepsis                    Nutrition/Diet History       Row Name 06/26/24 1212          Nutrition/Diet History    Typical Intake (Food/Fluid/EN/PN) Admit weight 160lb. Regular diet. No food allergies. Menu provided; reviewed how to place food orders. BM today. Continue current nutrition care plan.     Food Intolerance(s) NKFA                    Labs/Tests/Procedures/Meds       Row Name 06/26/24 1212          Labs/Procedures/Meds    Lab Results Reviewed reviewed     Lab Results Comments H/H        Diagnostic Tests/Procedures    Diagnostic Test/Procedure Reviewed reviewed        Medications    Pertinent Medications Reviewed reviewed     Pertinent Medications Comments See MAR                    Physical Findings       Row Name 06/26/24 1213          Physical Findings    Overall Physical Appearance Room air,  6/26, no skin breakdown.                    Estimated/Assessed Needs - Anthropometrics       Row Name 06/26/24 1213          Anthropometrics    Weight 72.6 kg (160 lb)     Weight for Calculation 72.6 kg (160 lb)        Estimated/Assessed Needs    Additional Documentation Fluid Requirements (Group);KCAL/KG (Group);Protein Requirements (Group)        KCAL/KG    KCAL/KG 25 Kcal/Kg (kcal)     25 Kcal/Kg (kcal) 1814.4        Protein Requirements    Weight Used For Protein Calculations 72.6 kg (160 lb)     Est Protein Requirement Amount (gms/kg) 1.2 gm protein     Estimated Protein Requirements (gms/day) 87.09        Fluid Requirements    Fluid Requirements (mL/day) 1814     RDA Method (mL) 1814                    Nutrition Prescription Ordered       Row  Name 06/26/24 1213          Nutrition Prescription PO    Current PO Diet Regular     Fluid Consistency Thin                    Evaluation of Received Nutrient/Fluid Intake       Row Name 06/26/24 1213          Nutrient/Fluid Evaluation    Number of Days Evaluated Other (comment)  admission < 24 hr                       Problem/Interventions:   Problem 1       Row Name 06/26/24 1213          Nutrition Diagnoses Problem 1    Problem 1 Nutrition Appropriate for Condition at this Time                          Intervention Goal       Row Name 06/26/24 1214          Intervention Goal    General Disease management/therapy;Meet nutritional needs for age/condition;Reduce/improve symptoms     PO Tolerate PO;Meet estimated needs     Weight Maintain weight                    Nutrition Intervention       Row Name 06/26/24 1214          Nutrition Intervention    RD/Tech Action Follow Tx progress;Care plan reviewd;Menu provided                    Nutrition Prescription       Row Name 06/26/24 1214          Nutrition Prescription PO    PO Prescription Other (comment)  continue same protocol                    Education/Evaluation       Row Name 06/26/24 1214          Education    Education No discharge needs identified at this time        Monitor/Evaluation    Monitor Per protocol                     Electronically signed by:  Mar Lopez RDN, LD  06/26/24 12:14 CDT

## 2024-06-27 PROCEDURE — 25010000002 ENOXAPARIN PER 10 MG: Performed by: INTERNAL MEDICINE

## 2024-06-27 PROCEDURE — 97530 THERAPEUTIC ACTIVITIES: CPT

## 2024-06-27 PROCEDURE — 99222 1ST HOSP IP/OBS MODERATE 55: CPT | Performed by: INTERNAL MEDICINE

## 2024-06-27 PROCEDURE — 25010000002 ONDANSETRON PER 1 MG: Performed by: INTERNAL MEDICINE

## 2024-06-27 PROCEDURE — 25010000002 HYDROMORPHONE PER 4 MG: Performed by: INTERNAL MEDICINE

## 2024-06-27 PROCEDURE — 25010000002 DAPTOMYCIN PER 1 MG: Performed by: INTERNAL MEDICINE

## 2024-06-27 NOTE — PROGRESS NOTES
RYAN Tate APRN        Internal Medicine Progress Note    6/27/2024   13:12 CDT    Name:  Jaqui Kate  MRN:    [id number]     Acct:     [id number]   Room:  [facility]/1  IP Day: 0     Admit Date: 6/25/2024  6:41 PM  PCP: Vanesa Guevara APRN    Subjective:     C/C: pain    Interval History: Status:  stayed the same. Resting in bed. No family at bedside. Afebrile. Asking about adjusting pain medication - something higher dosed, longer acting, close together. Also interested in other options for muscle spasms. Resting comfortably in bed. Had been using telephone as I entered the room. Current medication regimen: neurontin 300 mg po three times daily, lidocaine patch daily, dilaudid 0.4 mg every 8 hours as needed, tylenol and ibuprofen prn and oxycodone 10 or 20 mg po every 4 hours as needed.     Review of Systems   Constitutional: Positive for malaise/fatigue. Negative for chills, decreased appetite, weight gain and weight loss.   HENT:  Negative for congestion, ear discharge, hoarse voice and tinnitus.    Eyes:  Negative for blurred vision, discharge, visual disturbance and visual halos.   Cardiovascular:  Negative for chest pain, claudication, dyspnea on exertion, irregular heartbeat, leg swelling, orthopnea and paroxysmal nocturnal dyspnea.   Respiratory:  Negative for cough, shortness of breath, sputum production and wheezing.    Endocrine: Negative for cold intolerance, heat intolerance and polyuria.   Hematologic/Lymphatic: Negative for adenopathy. Does not bruise/bleed easily.   Skin:  Negative for dry skin, itching and suspicious lesions.   Musculoskeletal:  Positive for joint pain, muscle weakness and myalgias. Negative for arthritis, back pain and falls.   Gastrointestinal:  Negative for abdominal pain, constipation, diarrhea, dysphagia and hematemesis.   Genitourinary:  Negative for bladder incontinence, dysuria and frequency.   Neurological:  Positive for focal  weakness and weakness. Negative for aphonia, disturbances in coordination and dizziness.   Psychiatric/Behavioral:  Positive for substance abuse. Negative for altered mental status and depression. The patient is nervous/anxious. The patient does not have insomnia.          Medications:     Allergies: No Known Allergies    Current Meds:   Current Facility-Administered Medications:     acetaminophen (TYLENOL) tablet 650 mg, 650 mg, Oral, Q4H PRN **OR** acetaminophen (TYLENOL) suppository 650 mg, 650 mg, Rectal, Q4H PRN, Augustus Fish MD    baclofen (LIORESAL) tablet 10 mg, 10 mg, Oral, Q8H PRN, Augustus Fish MD    bisacodyl (DULCOLAX) suppository 10 mg, 10 mg, Rectal, Daily PRN, Augustus Fish MD    DAPTOmycin (CUBICIN) 600 mg in sodium chloride 0.9 % 50 mL IVPB, 8 mg/kg, Intravenous, Q24H, Augustus Fish MD    diphenhydrAMINE (BENADRYL) capsule 25 mg, 25 mg, Oral, Q6H PRN, Augustus Fish MD    Enoxaparin Sodium (LOVENOX) syringe 40 mg, 40 mg, Subcutaneous, Q24H, Augustus Fish MD    gabapentin (NEURONTIN) capsule 300 mg, 300 mg, Oral, TID, Augustus Fish MD    guaiFENesin (MUCINEX) 12 hr tablet 1,200 mg, 1,200 mg, Oral, Q12H PRN, Augustus Fish MD    HYDROmorphone (DILAUDID) injection 0.4 mg, 0.4 mg, Intravenous, Q8H PRN, Augustus Fish MD    ibuprofen (ADVIL,MOTRIN) tablet 400 mg, 400 mg, Oral, Q8H PRN, Augustus Fish MD    lactobacillus acidophilus (RISAQUAD) capsule 2 capsule, 2 capsule, Oral, Daily, Augustus Fish MD    Lidocaine 4 % 1 patch, 1 patch, Transdermal, Q24H, Augustus Fish MD    magnesium hydroxide (MILK OF MAGNESIA) 400 MG/5ML suspension 30 mL, 30 mL, Oral, BID PRN, Augustus Fish MD    melatonin tablet 5 mg, 5 mg, Oral, Nightly, Augustus Fish MD    nicotine (NICODERM CQ) 21 MG/24HR patch 1 patch, 1 patch, Transdermal, Q24H, Augustus Fish MD    OLANZapine (zyPREXA) tablet 5 mg,  "5 mg, Oral, Q6H PRN, Augustus Fish MD    ondansetron ODT (ZOFRAN-ODT) disintegrating tablet 4 mg, 4 mg, Oral, Q6H PRN **OR** ondansetron (ZOFRAN) injection 4 mg, 4 mg, Intravenous, Q6H PRN, Augustus Fish MD    oxyCODONE (ROXICODONE) immediate release tablet 10 mg, 10 mg, Oral, Q4H PRN, Augustus Fish MD    oxyCODONE (ROXICODONE) immediate release tablet 20 mg, 20 mg, Oral, Q4H PRN, Augustus Fish MD    pantoprazole (PROTONIX) EC tablet 40 mg, 40 mg, Oral, Daily, Augustus Fish MD    promethazine (PHENERGAN) tablet 12.5 mg, 12.5 mg, Oral, Q6H PRN, Augustus Fish MD    rifAMPin (RIFADIN) capsule 300 mg, 300 mg, Oral, Q12H, Augustus Fish MD    sennosides-docusate (PERICOLACE) 8.6-50 MG per tablet 1 tablet, 1 tablet, Oral, BID, Augustus Fish MD    sodium chloride 0.9 % bolus 250 mL, 250 mL, Intravenous, PRN, Augustus Fish MD    sodium chloride 0.9 % flush 10 mL, 10 mL, Intravenous, Q12H, Augustus Fish MD    sodium chloride 0.9 % flush 10 mL, 10 mL, Intravenous, PRN, Augustus Fish MD    tamsulosin (FLOMAX) 24 hr capsule 0.4 mg, 0.4 mg, Oral, Nightly, Augustus Fish MD    traZODone (DESYREL) tablet 100 mg, 100 mg, Oral, Nightly, Augustus Fish MD    Data:     Code Status:    There are no questions and answers to display.       Family History   Problem Relation Age of Onset    Colon cancer Neg Hx     Colon polyps Neg Hx        Social History     Socioeconomic History    Marital status: Single   Tobacco Use    Smoking status: Every Day     Current packs/day: 0.25     Types: Cigarettes    Smokeless tobacco: Never   Vaping Use    Vaping status: Some Days    Substances: Nicotine, Flavoring    Devices: Disposable, Pre-filled pod   Substance and Sexual Activity    Alcohol use: No    Drug use: Yes     Types: Methamphetamines    Sexual activity: Yes       Vitals:  Ht 160 cm (63\")   Wt 72.6 kg (160 lb)   BMI 28.34 kg/m² "   T 98.1 P 104 R 18 /83 Sp02 98% (room air)          I/O (24Hr):  No intake or output data in the 24 hours ending 06/27/24 1312    Labs and imaging:      No results found for this or any previous visit (from the past 12 hour(s)).                            Physical Examination:        Physical Exam  Vitals and nursing note reviewed.   Constitutional:       Appearance: Normal appearance.   HENT:      Head: Normocephalic and atraumatic.      Right Ear: External ear normal.      Left Ear: External ear normal.      Nose: Nose normal.      Mouth/Throat:      Mouth: Mucous membranes are dry.      Pharynx: Oropharynx is clear.   Eyes:      Extraocular Movements: Extraocular movements intact.      Conjunctiva/sclera: Conjunctivae normal.      Pupils: Pupils are equal, round, and reactive to light.   Cardiovascular:      Rate and Rhythm: Regular rhythm. Tachycardia present.      Pulses: Normal pulses.      Heart sounds: Normal heart sounds.   Pulmonary:      Effort: Pulmonary effort is normal.      Breath sounds: Normal breath sounds.   Abdominal:      General: Bowel sounds are normal.      Palpations: Abdomen is soft.   Musculoskeletal:      Cervical back: Normal range of motion and neck supple.      Comments: Generalized weakness  BLE weakness   Skin:     General: Skin is warm and dry.   Neurological:      Mental Status: She is alert and oriented to person, place, and time.   Psychiatric:         Mood and Affect: Mood normal.         Behavior: Behavior normal.           Assessment:          * No active hospital problems. 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 Monitor counts. Increase activity. Labs Monday. Continue pain  control efforts - no plans to further escalate current pain regimen at this time. In fact, beginning Monday, plans to de-escalate beginning with IV pain medication weaning. Wound care per wound care team. Maintain patient safety. Aggressive therapies as tolerated.       Electronically signed by KELLY Jones on 6/27/2024 at 13:12 CDT

## 2024-06-27 NOTE — CONSULTS
INFECTIOUS DISEASES CONSULT NOTE    Patient:  Jaqui Kate 33 y.o. female  ROOM # 580/1  YOB: 1990  MRN: [id number]  [facility]:  [id number]  Admit date: 6/25/2024   Admitting Physician: Augustus Fish MD  Primary Care Physician: Vanesa Guevara APRN  REFERRING PROVIDER: Augustus Fish, *    Inpatient Infectious Diseases Consult  Consult performed by: Debby Gastelum MD  Consult ordered by: Augustus Fish MD          REASON FOR CONSULTATION : Antibiotic recommendations regarding osteomyelitis/epidural abscess/MRSA bacteremia/endocarditis      HISTORY OF PRESENT ILLNESS: Patient is a 33-year-old female who I actually saw in January, 2024 when she was admitted as transfer from [facility] with septic shock secondary to MRSA bacteremia.  She was found to have tricuspid valve endocarditis, had acute kidney injury and history of methamphetamine injection.  She signed out the hospital AGAINST MEDICAL ADVICE approximately 5 days after admission.    Reviewing patient's discharge summary from [facility] from June 25, 2024, appears she was admitted there with acute discitis, osteomyelitis and epidural abscess extending into the mediastinum and encasing the aorta.    They note she had an admission in March, 2024 with MRSA bacteremia and endocarditis and completed oral linezolid after discharge.  Patient was noncompliant with outpatient follow-up after this admission.    She underwent T6-T11 posterolateral fusion with T8 posterior osteotomy per Dr. Banegas  Blood cultures apparently cleared as of Luna 10  They report spinal fusion on Luna 10 with operative cultures positive for MRSA  Patient was on daptomycin and rifampin      Past Medical History:   Diagnosis Date    Anxiety     Depressed     GERD (gastroesophageal reflux disease)     Hepatitis C    MRSA bacteremia-recurrent  Tricuspid valve endocarditis  Discitis/osteomyelitis/epidural abscess thoracic  "spine  Intravenous methamphetamine use      Past Surgical History:   Procedure Laterality Date     SECTION      x 2    TONSILLECTOMY       Family History   Problem Relation Age of Onset    Colon cancer Neg Hx     Colon polyps Neg Hx      Social History     Socioeconomic History    Marital status: Single   Tobacco Use    Smoking status: Every Day     Current packs/day: 0.25     Types: Cigarettes    Smokeless tobacco: Never   Vaping Use    Vaping status: Some Days    Substances: Nicotine, Flavoring    Devices: Disposable, Pre-filled pod   Substance and Sexual Activity    Alcohol use: No    Drug use: Yes     Types: Methamphetamines    Sexual activity: Yes       Current Scheduled Medications:   DAPTOmycin, 8 mg/kg, Intravenous, Q24H  enoxaparin, 40 mg, Subcutaneous, Q24H  gabapentin, 300 mg, Oral, TID  lactobacillus acidophilus, 2 capsule, Oral, Daily  Lidocaine, 1 patch, Transdermal, Q24H  melatonin, 5 mg, Oral, Nightly  nicotine, 1 patch, Transdermal, Q24H  pantoprazole, 40 mg, Oral, Daily  rifAMPin, 300 mg, Oral, Q12H  senna-docusate sodium, 1 tablet, Oral, BID  sodium chloride, 10 mL, Intravenous, Q12H  tamsulosin, 0.4 mg, Oral, Nightly  traZODone, 100 mg, Oral, Nightly              Current PRN Medications:    acetaminophen **OR** acetaminophen    baclofen    bisacodyl    diphenhydrAMINE    guaiFENesin    HYDROmorphone    ibuprofen    magnesium hydroxide    OLANZapine    ondansetron ODT **OR** ondansetron    oxyCODONE    oxyCODONE    promethazine    sodium chloride    sodium chloride              No Known Allergies        Vital Signs:  Ht 160 cm (63\")   Wt 72.6 kg (160 lb)   BMI 28.34 kg/m²   No data recorded.      98    104   18   123/83    Physical Exam    General: Patient is lying in bed in no acute distress  HEENT: Sclera anicteric and noninjected.  No conjunctival petechiae  Heart: S1 is 2 rate is regular  Lungs: Clear bilaterally  Abdomen: Soft, nontender, nondistended  Neuro: Alert and oriented, " speech clear, patient with bilateral lower extremity weakness.  Sensation is intact to light touch.          Results Review:    I reviewed the patient's new clinical results.    Lab Results:    CBC:   Lab Results   Lab 06/26/24 0327   WBC 6.59   HEMOGLOBIN 9.8*   HEMATOCRIT 32.0*   PLATELETS 585*        AutoDiff:   Lab Results   Lab 06/26/24 0327   NEUTROPHIL % 54.9   LYMPHOCYTE % 31.7   MONOCYTES % 8.6   EOSINOPHIL % 3.8   BASOPHIL % 0.5   NEUTROS ABS 3.62   LYMPHS ABS 2.09   MONOS ABS 0.57   EOS ABS 0.25   BASOS ABS 0.03        Manual Diff:    Lab Results   Lab 06/26/24 0327   NEUTROS ABS 3.62        CMP:   Lab Results   Lab 06/26/24 0327   SODIUM 138   POTASSIUM 3.9   CHLORIDE 101   CO2 28.0   BUN 18   CREATININE 0.72   CALCIUM 9.2   BILIRUBIN 0.2   ALK PHOS 202*   ALT (SGPT) 25   AST (SGOT) 16   GLUCOSE 101*       Lab Results (last 72 hours)       Procedure Component Value Units Date/Time    Prealbumin [[id number]]  (Normal) Collected: 06/26/24 0327    Specimen: Blood Updated: 06/26/24 1219     Prealbumin 25.6 mg/dL     CK [[id number]]  (Normal) Collected: 06/26/24 0327    Specimen: Blood Updated: 06/26/24 1101     Creatine Kinase 22 U/L     Comprehensive Metabolic Panel [[id number]]  (Abnormal) Collected: 06/26/24 0327    Specimen: Blood Updated: 06/26/24 0423     Glucose 101 mg/dL      BUN 18 mg/dL      Creatinine 0.72 mg/dL      Sodium 138 mmol/L      Potassium 3.9 mmol/L      Chloride 101 mmol/L      CO2 28.0 mmol/L      Calcium 9.2 mg/dL      Total Protein 7.8 g/dL      Albumin 3.4 g/dL      ALT (SGPT) 25 U/L      AST (SGOT) 16 U/L      Alkaline Phosphatase 202 U/L      Total Bilirubin 0.2 mg/dL      Globulin 4.4 gm/dL      A/G Ratio 0.8 g/dL      BUN/Creatinine Ratio 25.0     Anion Gap 9.0 mmol/L      eGFR 112.7 mL/min/1.73     Narrative:      GFR Normal >60  Chronic Kidney Disease <60  Kidney Failure <15      CBC & Differential [[id number]]  (Abnormal) Collected: 06/26/24 0327    Specimen: Blood Updated:  06/26/24 0401    Narrative:      The following orders were created for panel order CBC & Differential.  Procedure                               Abnormality         Status                     ---------                               -----------         ------                     CBC Auto Differential[[id number]]        Abnormal            Final result                 Please view results for these tests on the individual orders.    CBC Auto Differential [[id number]]  (Abnormal) Collected: 06/26/24 0327    Specimen: Blood Updated: 06/26/24 0401     WBC 6.59 10*3/mm3      RBC 3.73 10*6/mm3      Hemoglobin 9.8 g/dL      Hematocrit 32.0 %      MCV 85.8 fL      MCH 26.3 pg      MCHC 30.6 g/dL      RDW 16.6 %      RDW-SD 50.7 fl      MPV 8.7 fL      Platelets 585 10*3/mm3      Neutrophil % 54.9 %      Lymphocyte % 31.7 %      Monocyte % 8.6 %      Eosinophil % 3.8 %      Basophil % 0.5 %      Immature Grans % 0.5 %      Neutrophils, Absolute 3.62 10*3/mm3      Lymphocytes, Absolute 2.09 10*3/mm3      Monocytes, Absolute 0.57 10*3/mm3      Eosinophils, Absolute 0.25 10*3/mm3      Basophils, Absolute 0.03 10*3/mm3      Immature Grans, Absolute 0.03 10*3/mm3      nRBC 0.0 /100 WBC             Estimated Creatinine Clearance: 105.2 mL/min (by C-G formula based on SCr of 0.72 mg/dL).    Culture Results:    Microbiology Results (last 10 days)       ** No results found for the last 240 hours. **               Radiology:   Imaging Results (Last 72 Hours)       ** No results found for the last 72 hours. **              HOSPITAL PROBLEM LIST:      * No active hospital problems. *      IMPRESSION:  Thoracic MRSA discitis/osteomyelitis/epidural abscess status post drainage Luna 10 at [facility] in [place]  History of recurrent MRSA bacteremia and tricuspid valve endocarditis-most recently treated in March, 2024 discharged to complete oral linezolid.  Notes indicate she did not keep follow-up appointments.  This admission note  notes that she cleared the bloodstream Luna 10  History of hepatitis C untreated        RECOMMENDATION:   Continue daptomycin  Continue oral rifampin  Have placed order to get last note from infectious diseases consulted or their discharge recommendations  Will need periodic monitoring of CK if staying on daptomycin.  Noted plans for oral suppressive therapy possibly for life  Continue PT         Debby Gastelum MD  06/27/24  16:47 CDT

## 2024-06-28 PROCEDURE — 25010000002 DAPTOMYCIN PER 1 MG: Performed by: INTERNAL MEDICINE

## 2024-06-28 PROCEDURE — 99232 SBSQ HOSP IP/OBS MODERATE 35: CPT | Performed by: INTERNAL MEDICINE

## 2024-06-28 PROCEDURE — 63710000001 DIPHENHYDRAMINE PER 50 MG: Performed by: INTERNAL MEDICINE

## 2024-06-28 PROCEDURE — 97535 SELF CARE MNGMENT TRAINING: CPT

## 2024-06-28 PROCEDURE — 25010000002 ENOXAPARIN PER 10 MG: Performed by: INTERNAL MEDICINE

## 2024-06-28 PROCEDURE — 25010000002 HYDROMORPHONE PER 4 MG: Performed by: NURSE PRACTITIONER

## 2024-06-28 PROCEDURE — 25010000002 HYDROMORPHONE PER 4 MG: Performed by: INTERNAL MEDICINE

## 2024-06-28 RX ORDER — OXYCODONE HYDROCHLORIDE 5 MG/1
20 TABLET ORAL EVERY 4 HOURS PRN
Status: DISCONTINUED | OUTPATIENT
Start: 2024-06-28 | End: 2024-07-03

## 2024-06-28 RX ORDER — HYDROMORPHONE HYDROCHLORIDE 1 MG/ML
0.2 INJECTION, SOLUTION INTRAMUSCULAR; INTRAVENOUS; SUBCUTANEOUS EVERY 8 HOURS PRN
Status: DISPENSED | OUTPATIENT
Start: 2024-06-28 | End: 2024-07-01

## 2024-06-28 RX ORDER — CYCLOBENZAPRINE HCL 10 MG
10 TABLET ORAL EVERY 8 HOURS PRN
Status: DISCONTINUED | OUTPATIENT
Start: 2024-06-28 | End: 2024-07-18

## 2024-06-28 NOTE — PROGRESS NOTES
RYAN Tate APRN        Internal Medicine Progress Note    6/28/2024   07:46 CDT    Name:  Jaqui Kate  MRN:    [id number]     Acct:     [id number]   Room:  [facility]/1   Day: 0     Admit Date: 6/25/2024  6:41 PM  PCP: Vanesa Guevara APRN    Subjective:     C/C: pain    Interval History: Status:  stayed the same. Resting in bed. Family asleep at bedside. Low grade temp noted. Discussed pain and current regimen. Explained that IV pain medication is going to be weaned and discontinued. Patient agreeable. C/o increased muscle spasms and states that this is the greatest source of discomfort at this time. She reports that she has taken robaxin in the past with limited symptom relief. Maintaining adequate 02 sats on room air. Appears in no acute distress. Tolerating treatment thus far.     Review of Systems   Constitutional: Positive for malaise/fatigue. Negative for chills, decreased appetite, weight gain and weight loss.   HENT:  Negative for congestion, ear discharge, hoarse voice and tinnitus.    Eyes:  Negative for blurred vision, discharge, visual disturbance and visual halos.   Cardiovascular:  Negative for chest pain, claudication, dyspnea on exertion, irregular heartbeat, leg swelling, orthopnea and paroxysmal nocturnal dyspnea.   Respiratory:  Negative for cough, shortness of breath, sputum production and wheezing.    Endocrine: Negative for cold intolerance, heat intolerance and polyuria.   Hematologic/Lymphatic: Negative for adenopathy. Does not bruise/bleed easily.   Skin:  Negative for dry skin, itching and suspicious lesions.   Musculoskeletal:  Positive for joint pain, muscle weakness and myalgias. Negative for arthritis, back pain and falls.   Gastrointestinal:  Negative for abdominal pain, constipation, diarrhea, dysphagia and hematemesis.   Genitourinary:  Negative for bladder incontinence, dysuria and frequency.   Neurological:  Positive for focal  weakness and weakness. Negative for aphonia, disturbances in coordination and dizziness.   Psychiatric/Behavioral:  Positive for substance abuse. Negative for altered mental status and depression. The patient is nervous/anxious. The patient does not have insomnia.          Medications:     Allergies: No Known Allergies    Current Meds:   Current Facility-Administered Medications:     acetaminophen (TYLENOL) tablet 650 mg, 650 mg, Oral, Q4H PRN **OR** acetaminophen (TYLENOL) suppository 650 mg, 650 mg, Rectal, Q4H PRN, Augustus Fish MD    baclofen (LIORESAL) tablet 10 mg, 10 mg, Oral, Q8H PRN, Augustus Fish MD    bisacodyl (DULCOLAX) suppository 10 mg, 10 mg, Rectal, Daily PRN, Augustus Fish MD    DAPTOmycin (CUBICIN) 600 mg in sodium chloride 0.9 % 50 mL IVPB, 8 mg/kg, Intravenous, Q24H, Augustus Fish MD    diphenhydrAMINE (BENADRYL) capsule 25 mg, 25 mg, Oral, Q6H PRN, Augustus Fish MD    Enoxaparin Sodium (LOVENOX) syringe 40 mg, 40 mg, Subcutaneous, Q24H, Augustus Fish MD    gabapentin (NEURONTIN) capsule 300 mg, 300 mg, Oral, TID, Augustus Fish MD    guaiFENesin (MUCINEX) 12 hr tablet 1,200 mg, 1,200 mg, Oral, Q12H PRN, Augustus Fish MD    HYDROmorphone (DILAUDID) injection 0.4 mg, 0.4 mg, Intravenous, Q8H PRN, Augustus Fish MD    ibuprofen (ADVIL,MOTRIN) tablet 400 mg, 400 mg, Oral, Q8H PRN, Augustus Fish MD    lactobacillus acidophilus (RISAQUAD) capsule 2 capsule, 2 capsule, Oral, Daily, Augustus Fish MD    Lidocaine 4 % 1 patch, 1 patch, Transdermal, Q24H, Augustus Fish MD    magnesium hydroxide (MILK OF MAGNESIA) 400 MG/5ML suspension 30 mL, 30 mL, Oral, BID PRN, Augustus Fish MD    melatonin tablet 5 mg, 5 mg, Oral, Nightly, Augustus Fish MD    nicotine (NICODERM CQ) 21 MG/24HR patch 1 patch, 1 patch, Transdermal, Q24H, Augustus Fish MD    OLANZapine (zyPREXA) tablet 5 mg,  "5 mg, Oral, Q6H PRN, Augustus Fish MD    ondansetron ODT (ZOFRAN-ODT) disintegrating tablet 4 mg, 4 mg, Oral, Q6H PRN **OR** ondansetron (ZOFRAN) injection 4 mg, 4 mg, Intravenous, Q6H PRN, Augustus Fish MD    oxyCODONE (ROXICODONE) immediate release tablet 10 mg, 10 mg, Oral, Q4H PRN, Augustus Fish MD    oxyCODONE (ROXICODONE) immediate release tablet 20 mg, 20 mg, Oral, Q4H PRN, Augustus Fish MD    pantoprazole (PROTONIX) EC tablet 40 mg, 40 mg, Oral, Daily, Augusuts Fish MD    promethazine (PHENERGAN) tablet 12.5 mg, 12.5 mg, Oral, Q6H PRN, Augustus Fish MD    rifAMPin (RIFADIN) capsule 300 mg, 300 mg, Oral, Q12H, Augustus Fish MD    sennosides-docusate (PERICOLACE) 8.6-50 MG per tablet 1 tablet, 1 tablet, Oral, BID, Augustus Fish MD    sodium chloride 0.9 % bolus 250 mL, 250 mL, Intravenous, PRN, Augustus Fish MD    sodium chloride 0.9 % flush 10 mL, 10 mL, Intravenous, Q12H, Augustus Fish MD    sodium chloride 0.9 % flush 10 mL, 10 mL, Intravenous, PRN, Augustus Fish MD    tamsulosin (FLOMAX) 24 hr capsule 0.4 mg, 0.4 mg, Oral, Nightly, Augustus Fish MD    traZODone (DESYREL) tablet 100 mg, 100 mg, Oral, Nightly, Augustus Fish MD    Data:     Code Status:    There are no questions and answers to display.       Family History   Problem Relation Age of Onset    Colon cancer Neg Hx     Colon polyps Neg Hx        Social History     Socioeconomic History    Marital status: Single   Tobacco Use    Smoking status: Every Day     Current packs/day: 0.25     Types: Cigarettes    Smokeless tobacco: Never   Vaping Use    Vaping status: Some Days    Substances: Nicotine, Flavoring    Devices: Disposable, Pre-filled pod   Substance and Sexual Activity    Alcohol use: No    Drug use: Yes     Types: Methamphetamines    Sexual activity: Yes       Vitals:  Ht 160 cm (63\")   Wt 72.6 kg (160 lb)   BMI 28.34 kg/m² "   T 99.0 P 116 R 16 /78 Sp02 97% (room air)          I/O (24Hr):  No intake or output data in the 24 hours ending 06/28/24 0746    Labs and imaging:      No results found for this or any previous visit (from the past 12 hour(s)).                            Physical Examination:        Physical Exam  Vitals and nursing note reviewed.   Constitutional:       Appearance: Normal appearance.   HENT:      Head: Normocephalic and atraumatic.      Right Ear: External ear normal.      Left Ear: External ear normal.      Nose: Nose normal.      Mouth/Throat:      Mouth: Mucous membranes are dry.      Pharynx: Oropharynx is clear.   Eyes:      Extraocular Movements: Extraocular movements intact.      Conjunctiva/sclera: Conjunctivae normal.      Pupils: Pupils are equal, round, and reactive to light.   Cardiovascular:      Rate and Rhythm: Regular rhythm. Tachycardia present.      Pulses: Normal pulses.      Heart sounds: Normal heart sounds.   Pulmonary:      Effort: Pulmonary effort is normal.      Breath sounds: Normal breath sounds.   Abdominal:      General: Bowel sounds are normal.      Palpations: Abdomen is soft.   Musculoskeletal:      Cervical back: Normal range of motion and neck supple.      Comments: Generalized weakness  BLE weakness   Skin:     General: Skin is warm and dry.   Neurological:      Mental Status: She is alert and oriented to person, place, and time.   Psychiatric:         Mood and Affect: Mood normal.         Behavior: Behavior normal.           Assessment:          * No active hospital problems. *    Past Medical History:   Diagnosis Date    Anxiety     Depressed     GERD (gastroesophageal reflux disease)     Hepatitis C         Plan:        T8-9 MRSA discitis/osteomyelitis  Polysubstance abuse  History of triscupid valve endocarditis  Hepatitis C  Multifactorial anemia  GERD  Anxiety  Depression    Continue current treatment. Monitor counts. Increase activity. Labs Monday. Continue pain  control efforts - wean IV dilaudid and add flexeril. Wound care per wound care team. Maintain patient safety. Aggressive therapies as tolerated.       Electronically signed by KELLY Jones on 6/28/2024 at 07:46 CDT     I have discussed the care of Jaqui Kate, including pertinent history and exam findings, with the nurse practitioner.    I have seen and examined the patient and the key elements of all parts of the encounter have been performed by me.  I agree with the assessment, plan and orders as documented by KELLY Philip, after I modified the exam findings and the plan of treatments and the final version is my approved version of the assessment.        Electronically signed by Augustus Fish MD on 6/28/2024 at 15:02 CDT

## 2024-06-28 NOTE — PROGRESS NOTES
"INFECTIOUS DISEASES PROGRESS NOTE    Patient:  Jaqui Kate  YOB: 1990  MRN: [id number]   Admit date: 6/25/2024   Admitting Physician: Augustus Fish MD  Primary Care Physician: Vanesa Guevara APRN    Chief Complaint: Arthritis        Interval History: Patient complaining of her arthritis mainly in her knees.  Her mom is at bedside and asked about increasing her gabapentin.  I told her that would be up to the admitting service but she could let her nurse know    Allergies: No Known Allergies    Current Scheduled Medications:   DAPTOmycin, 8 mg/kg, Intravenous, Q24H  enoxaparin, 40 mg, Subcutaneous, Q24H  gabapentin, 300 mg, Oral, TID  lactobacillus acidophilus, 2 capsule, Oral, Daily  Lidocaine, 1 patch, Transdermal, Q24H  melatonin, 5 mg, Oral, Nightly  nicotine, 1 patch, Transdermal, Q24H  pantoprazole, 40 mg, Oral, Daily  rifAMPin, 300 mg, Oral, Q12H  senna-docusate sodium, 1 tablet, Oral, BID  sodium chloride, 10 mL, Intravenous, Q12H  tamsulosin, 0.4 mg, Oral, Nightly  traZODone, 100 mg, Oral, Nightly      Current PRN Medications:    acetaminophen **OR** acetaminophen    bisacodyl    cyclobenzaprine    diphenhydrAMINE    guaiFENesin    HYDROmorphone    ibuprofen    magnesium hydroxide    OLANZapine    ondansetron ODT **OR** ondansetron    oxyCODONE    promethazine    sodium chloride    sodium chloride            Objective     Vital Signs:  No data recorded.      Ht 160 cm (63\")   Wt 72.6 kg (160 lb)   BMI 28.34 kg/m²     Temperature 99 heart rate 116 respiratory rate 16 blood pressure 120/78    Physical Exam:  General: Patient nontoxic-appearing lying in bed in a dark room in no acute distress.  Her mom is at bedside  HEENT: Sclera anicteric.  There is no conjunctival petechiae  Heart: S1 is 2 rhythm is regular rate is tachycardic around 105  Neuro: Alert, oriented, speech clear, moves upper extremity without difficulty.  Reviewed PT notes and patient is dependent for lying to sit " and sitting to lie.  Unable to stand.        Results Review:    I reviewed the patient's new clinical results.    Lab Results:    CBC:   Lab Results   Lab 06/26/24 0327   WBC 6.59   HEMOGLOBIN 9.8*   HEMATOCRIT 32.0*   PLATELETS 585*        AutoDiff:   Lab Results   Lab 06/26/24 0327   NEUTROPHIL % 54.9   LYMPHOCYTE % 31.7   MONOCYTES % 8.6   EOSINOPHIL % 3.8   BASOPHIL % 0.5   NEUTROS ABS 3.62   LYMPHS ABS 2.09   MONOS ABS 0.57   EOS ABS 0.25   BASOS ABS 0.03        Manual Diff:    Lab Results   Lab 06/26/24 0327   NEUTROS ABS 3.62           CMP:   Lab Results   Lab 06/26/24 0327   SODIUM 138   POTASSIUM 3.9   CHLORIDE 101   CO2 28.0   BUN 18   CREATININE 0.72   CALCIUM 9.2   BILIRUBIN 0.2   ALK PHOS 202*   ALT (SGPT) 25   AST (SGOT) 16   GLUCOSE 101*       Estimated Creatinine Clearance: 105.2 mL/min (by C-G formula based on SCr of 0.72 mg/dL).    Culture Results:    Microbiology Results (last 10 days)       ** No results found for the last 240 hours. **           Creatinine kinase June 26, 2024 is equal to 22      Radiology:   Imaging Results (Last 72 Hours)       ** No results found for the last 72 hours. **                There are no active hospital problems to display for this patient.      IMPRESSION:  MRSA discitis/osteomyelitis/epidural abscess thoracic spine status postsurgical drainage Luna [facility] at 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      RECOMMENDATION:   Continue daptomycin-current plan is through July 20  Continue oral rifampin-current plan is through July 20  Will verify with [facility] that there were no gap and MRSA coverage  Will need periodic creatinine kinase monitoring given daptomycin use  Encourage patient to work with  physical therapy, Occupational Therapy every chance she gets  Discussed with patient oral suppressive therapy for life-per review of cultures she is options to include tetracycline/doxycycline/minocycline, clindamycin and Bactrim      Reviewed infectious diseases consult from [facility] Dr. Gricelda Borja Luna 10, 2024    MRSA was susceptible to clindamycin, linezolid, tetracycline, trimethoprim sulfa    Debby Gastelum MD  06/28/24  12:25 CDT

## 2024-06-28 NOTE — PROGRESS NOTES
[facility] Fall Risk Assessment Note    Name: Jaqui Kate  MRN: [id number]  CSN: [id number]  Admit Date/Time: 6/25/2024  6:41 PM.    Currently ordered medications associated with an increased risk for fall include:    Scheduled Meds:  DAPTOmycin, 8 mg/kg, Intravenous, Q24H  gabapentin, 300 mg, Oral, TID  melatonin, 5 mg, Oral, Nightly  rifAMPin, 300 mg, Oral, Q12H  senna-docusate sodium, 1 tablet, Oral, BID  tamsulosin, 0.4 mg, Oral, Nightly  traZODone, 100 mg, Oral, Nightly    PRN Meds:    bisacodyl    cyclobenzaprine    diphenhydrAMINE    HYDROmorphone    ibuprofen    magnesium hydroxide    OLANZapine    ondansetron ODT **OR** ondansetron    oxyCODONE    promethazine    Victoriano Brunner, PharmD  06/28/24 16:33 CDT

## 2024-06-29 PROCEDURE — 63710000001 ONDANSETRON ODT 4 MG TABLET DISPERSIBLE: Performed by: INTERNAL MEDICINE

## 2024-06-29 PROCEDURE — 25010000002 HYDROMORPHONE PER 4 MG: Performed by: NURSE PRACTITIONER

## 2024-06-29 PROCEDURE — 97530 THERAPEUTIC ACTIVITIES: CPT

## 2024-06-29 PROCEDURE — 25010000002 DAPTOMYCIN PER 1 MG: Performed by: INTERNAL MEDICINE

## 2024-06-29 PROCEDURE — 25010000002 ENOXAPARIN PER 10 MG: Performed by: INTERNAL MEDICINE

## 2024-06-29 NOTE — PROGRESS NOTES
"[facility]        Internal Medicine Progress Note    6/29/2024   08:27 CDT    Name:  Jaqui Kate  MRN:    [id number]     Acct:     [id number]   Room:  [address] [place] [address] Day: 0     Admit Date: 6/25/2024  6:41 PM  PCP: Vanesa Guevara APRN    Subjective:     C/C: pain    Interval History: Status:  stayed the same. Resting in bed. Family in bathroom. Pt complains of pain all over, pain medications \"due\". Afebrile this am. No new concerns.     Review of Systems   Constitutional: Positive for malaise/fatigue. Negative for chills, decreased appetite, weight gain and weight loss.   HENT:  Negative for congestion, ear discharge, hoarse voice and tinnitus.    Eyes:  Negative for blurred vision, discharge, visual disturbance and visual halos.   Cardiovascular:  Negative for chest pain, claudication, dyspnea on exertion, irregular heartbeat, leg swelling, orthopnea and paroxysmal nocturnal dyspnea.   Respiratory:  Negative for cough, shortness of breath, sputum production and wheezing.    Endocrine: Negative for cold intolerance, heat intolerance and polyuria.   Hematologic/Lymphatic: Negative for adenopathy. Does not bruise/bleed easily.   Skin:  Negative for dry skin, itching and suspicious lesions.   Musculoskeletal:  Positive for joint pain, muscle weakness and myalgias. Negative for arthritis, back pain and falls.   Gastrointestinal:  Negative for abdominal pain, constipation, diarrhea, dysphagia and hematemesis.   Genitourinary:  Negative for bladder incontinence, dysuria and frequency.   Neurological:  Positive for focal weakness and weakness. Negative for aphonia, disturbances in coordination and dizziness.   Psychiatric/Behavioral:  Positive for substance abuse. Negative for altered mental status and depression. The patient is nervous/anxious. The patient does not have insomnia.          Medications:     Allergies: No Known Allergies    Current Meds:   Current " Facility-Administered Medications:     acetaminophen (TYLENOL) tablet 650 mg, 650 mg, Oral, Q4H PRN **OR** acetaminophen (TYLENOL) suppository 650 mg, 650 mg, Rectal, Q4H PRN, Augustus Fish MD    bisacodyl (DULCOLAX) suppository 10 mg, 10 mg, Rectal, Daily PRN, Augustus Fish MD    cyclobenzaprine (FLEXERIL) tablet 10 mg, 10 mg, Oral, Q8H PRN, Qing Jimenez, KELLY    DAPTOmycin (CUBICIN) 600 mg in sodium chloride 0.9 % 50 mL IVPB, 8 mg/kg, Intravenous, Q24H, Augustus Fish MD    diphenhydrAMINE (BENADRYL) capsule 25 mg, 25 mg, Oral, Q6H PRN, Augustus Fish MD    Enoxaparin Sodium (LOVENOX) syringe 40 mg, 40 mg, Subcutaneous, Q24H, Augustus Fish MD    gabapentin (NEURONTIN) capsule 300 mg, 300 mg, Oral, TID, Augustus Fish MD    guaiFENesin (MUCINEX) 12 hr tablet 1,200 mg, 1,200 mg, Oral, Q12H PRN, Augustus Fish MD    HYDROmorphone (DILAUDID) injection 0.2 mg ** ENDS ON 07/01 -- DO NOT REORDER **, 0.2 mg, Intravenous, Q8H PRN, Qing Jimenez, KELLY    ibuprofen (ADVIL,MOTRIN) tablet 400 mg, 400 mg, Oral, Q8H PRN, Augustus Fish MD    lactobacillus acidophilus (RISAQUAD) capsule 2 capsule, 2 capsule, Oral, Daily, Augustus Fish MD    Lidocaine 4 % 1 patch, 1 patch, Transdermal, Q24H, Augustus Fish MD    magnesium hydroxide (MILK OF MAGNESIA) 400 MG/5ML suspension 30 mL, 30 mL, Oral, BID PRN, Augustus Fish MD    melatonin tablet 5 mg, 5 mg, Oral, Nightly, Augustus Fish MD    nicotine (NICODERM CQ) 21 MG/24HR patch 1 patch, 1 patch, Transdermal, Q24H, Augustus Fish MD    OLANZapine (zyPREXA) tablet 5 mg, 5 mg, Oral, Q6H PRN, Augustus Fish MD    ondansetron ODT (ZOFRAN-ODT) disintegrating tablet 4 mg, 4 mg, Oral, Q6H PRN **OR** ondansetron (ZOFRAN) injection 4 mg, 4 mg, Intravenous, Q6H PRN, Augustus Fish MD    oxyCODONE (ROXICODONE) immediate release tablet 20 mg, 20 mg, Oral, Q4H  "JOSEN, Qing Jimenez, KELLY    pantoprazole (PROTONIX) EC tablet 40 mg, 40 mg, Oral, Daily, Augustus Fish MD    promethazine (PHENERGAN) tablet 12.5 mg, 12.5 mg, Oral, Q6H PRN, Augustus Fish MD    rifAMPin (RIFADIN) capsule 300 mg, 300 mg, Oral, Q12H, Augustus Fish MD    sennosides-docusate (PERICOLACE) 8.6-50 MG per tablet 1 tablet, 1 tablet, Oral, BID, Augustus Fish MD    sodium chloride 0.9 % bolus 250 mL, 250 mL, Intravenous, PRN, Augustus Fish MD    sodium chloride 0.9 % flush 10 mL, 10 mL, Intravenous, Q12H, Augustus Fish MD    sodium chloride 0.9 % flush 10 mL, 10 mL, Intravenous, PRN, Augustus Fish MD    tamsulosin (FLOMAX) 24 hr capsule 0.4 mg, 0.4 mg, Oral, Nightly, Augustus Fish MD    traZODone (DESYREL) tablet 100 mg, 100 mg, Oral, Nightly, Augustus Fish MD    Data:     Code Status:    There are no questions and answers to display.       Family History   Problem Relation Age of Onset    Colon cancer Neg Hx     Colon polyps Neg Hx        Social History     Socioeconomic History    Marital status: Single   Tobacco Use    Smoking status: Every Day     Current packs/day: 0.25     Types: Cigarettes    Smokeless tobacco: Never   Vaping Use    Vaping status: Some Days    Substances: Nicotine, Flavoring    Devices: Disposable, Pre-filled pod   Substance and Sexual Activity    Alcohol use: No    Drug use: Yes     Types: Methamphetamines    Sexual activity: Yes       Vitals:  Ht 160 cm (63\")   Wt 72.6 kg (160 lb)   BMI 28.34 kg/m²   T 98.2 P 104 R 12 /74 Sp02 95% (room air)          I/O (24Hr):  No intake or output data in the 24 hours ending 06/29/24 0827    Labs and imaging:      No results found for this or any previous visit (from the past 12 hour(s)).                            Physical Examination:        Physical Exam  Vitals and nursing note reviewed.   Constitutional:       Appearance: Normal appearance.   HENT: "      Head: Normocephalic and atraumatic.      Right Ear: External ear normal.      Left Ear: External ear normal.      Nose: Nose normal.      Mouth/Throat:      Mouth: Mucous membranes are dry.      Pharynx: Oropharynx is clear.   Eyes:      Extraocular Movements: Extraocular movements intact.      Conjunctiva/sclera: Conjunctivae normal.      Pupils: Pupils are equal, round, and reactive to light.   Cardiovascular:      Rate and Rhythm: Regular rhythm. Tachycardia present.      Pulses: Normal pulses.      Heart sounds: Normal heart sounds.   Pulmonary:      Effort: Pulmonary effort is normal.      Breath sounds: Normal breath sounds.   Abdominal:      General: Bowel sounds are normal.      Palpations: Abdomen is soft.   Musculoskeletal:      Cervical back: Normal range of motion and neck supple.      Comments: Generalized weakness  BLE weakness   Skin:     General: Skin is warm and dry.   Neurological:      Mental Status: She is alert and oriented to person, place, and time.   Psychiatric:         Mood and Affect: Mood normal.         Behavior: Behavior normal.           Assessment:          * No active hospital problems. *    Past Medical History:   Diagnosis Date    Anxiety     Depressed     GERD (gastroesophageal reflux disease)     Hepatitis C         Plan:        T8-9 MRSA discitis/osteomyelitis  Polysubstance abuse  History of triscupid valve endocarditis  Hepatitis C  Multifactorial anemia  GERD  Anxiety  Depression    Continue current treatment. Monitor counts. Increase activity. Labs Monday. Continue pain control efforts - wean IV dilaudid and add flexeril. Wound care per wound care team. Maintain patient safety. Aggressive therapies as tolerated.       Electronically signed by KELLY Urena on 6/29/2024 at 08:27 CDT

## 2024-06-30 PROCEDURE — 97530 THERAPEUTIC ACTIVITIES: CPT

## 2024-06-30 PROCEDURE — 63710000001 ONDANSETRON ODT 4 MG TABLET DISPERSIBLE: Performed by: INTERNAL MEDICINE

## 2024-06-30 PROCEDURE — 25010000002 DAPTOMYCIN PER 1 MG: Performed by: INTERNAL MEDICINE

## 2024-06-30 PROCEDURE — 25010000002 HYDROMORPHONE PER 4 MG: Performed by: NURSE PRACTITIONER

## 2024-06-30 PROCEDURE — 25010000002 ENOXAPARIN PER 10 MG: Performed by: INTERNAL MEDICINE

## 2024-06-30 NOTE — PROGRESS NOTES
RYAN Tate APRN        Internal Medicine Progress Note    6/30/2024   08:48 CDT    Name:  Jaqui Kate  MRN:    [id number]     Acct:     [id number]   Room:  [facility] Day: 0     Admit Date: 6/25/2024  6:41 PM  PCP: Vanesa Guevara APRN    Subjective:     C/C: pain    Interval History: Status:  stayed the same. Resting in bed. Family asleep at bedside. Pt asleep, appears to be resting comfortably.  Staff report a smell in room, feel significant other is vaping in bathroom.      Review of Systems   Constitutional: Positive for malaise/fatigue. Negative for chills, decreased appetite, weight gain and weight loss.   HENT:  Negative for congestion, ear discharge, hoarse voice and tinnitus.    Eyes:  Negative for blurred vision, discharge, visual disturbance and visual halos.   Cardiovascular:  Negative for chest pain, claudication, dyspnea on exertion, irregular heartbeat, leg swelling, orthopnea and paroxysmal nocturnal dyspnea.   Respiratory:  Negative for cough, shortness of breath, sputum production and wheezing.    Endocrine: Negative for cold intolerance, heat intolerance and polyuria.   Hematologic/Lymphatic: Negative for adenopathy. Does not bruise/bleed easily.   Skin:  Negative for dry skin, itching and suspicious lesions.   Musculoskeletal:  Positive for joint pain, muscle weakness and myalgias. Negative for arthritis, back pain and falls.   Gastrointestinal:  Negative for abdominal pain, constipation, diarrhea, dysphagia and hematemesis.   Genitourinary:  Negative for bladder incontinence, dysuria and frequency.   Neurological:  Positive for focal weakness and weakness. Negative for aphonia, disturbances in coordination and dizziness.   Psychiatric/Behavioral:  Positive for substance abuse. Negative for altered mental status and depression. The patient is nervous/anxious. The patient does not have insomnia.          Medications:     Allergies: No Known  Allergies    Current Meds:   Current Facility-Administered Medications:     acetaminophen (TYLENOL) tablet 650 mg, 650 mg, Oral, Q4H PRN **OR** acetaminophen (TYLENOL) suppository 650 mg, 650 mg, Rectal, Q4H PRN, Augustus Fish MD    bisacodyl (DULCOLAX) suppository 10 mg, 10 mg, Rectal, Daily PRN, Augustus Fish MD    cyclobenzaprine (FLEXERIL) tablet 10 mg, 10 mg, Oral, Q8H PRN, Qing Jimenez, KELLY    DAPTOmycin (CUBICIN) 600 mg in sodium chloride 0.9 % 50 mL IVPB, 8 mg/kg, Intravenous, Q24H, Augustus Fish MD    diphenhydrAMINE (BENADRYL) capsule 25 mg, 25 mg, Oral, Q6H PRN, Augustus Fish MD    Enoxaparin Sodium (LOVENOX) syringe 40 mg, 40 mg, Subcutaneous, Q24H, Augustus Fish MD    gabapentin (NEURONTIN) capsule 300 mg, 300 mg, Oral, TID, Augustus Fish MD    guaiFENesin (MUCINEX) 12 hr tablet 1,200 mg, 1,200 mg, Oral, Q12H PRN, Augustus Fish MD    HYDROmorphone (DILAUDID) injection 0.2 mg ** ENDS ON 07/01 -- DO NOT REORDER **, 0.2 mg, Intravenous, Q8H PRN, Qing Jimenez, APRN    ibuprofen (ADVIL,MOTRIN) tablet 400 mg, 400 mg, Oral, Q8H PRN, Augustus Fish MD    lactobacillus acidophilus (RISAQUAD) capsule 2 capsule, 2 capsule, Oral, Daily, Augustus Fish MD    Lidocaine 4 % 1 patch, 1 patch, Transdermal, Q24H, Augustus Fish MD    magnesium hydroxide (MILK OF MAGNESIA) 400 MG/5ML suspension 30 mL, 30 mL, Oral, BID PRN, Augustus Fish MD    melatonin tablet 5 mg, 5 mg, Oral, Nightly, Augustus Fish MD    nicotine (NICODERM CQ) 21 MG/24HR patch 1 patch, 1 patch, Transdermal, Q24H, Augustus Fish MD    OLANZapine (zyPREXA) tablet 5 mg, 5 mg, Oral, Q6H PRN, Augustus Fish MD    ondansetron ODT (ZOFRAN-ODT) disintegrating tablet 4 mg, 4 mg, Oral, Q6H PRN **OR** ondansetron (ZOFRAN) injection 4 mg, 4 mg, Intravenous, Q6H PRN, Augustus Fish MD    oxyCODONE (ROXICODONE) immediate  "release tablet 20 mg, 20 mg, Oral, Q4H PRN, Qing Jimenez APRN    pantoprazole (PROTONIX) EC tablet 40 mg, 40 mg, Oral, Daily, Augustus Fish MD    promethazine (PHENERGAN) tablet 12.5 mg, 12.5 mg, Oral, Q6H PRN, Augustus Fish MD    rifAMPin (RIFADIN) capsule 300 mg, 300 mg, Oral, Q12H, Augustus Fish MD    sennosides-docusate (PERICOLACE) 8.6-50 MG per tablet 1 tablet, 1 tablet, Oral, BID, Augustus Fish MD    sodium chloride 0.9 % bolus 250 mL, 250 mL, Intravenous, PRN, Augustus Fish MD    sodium chloride 0.9 % flush 10 mL, 10 mL, Intravenous, Q12H, Augustus Fish MD    sodium chloride 0.9 % flush 10 mL, 10 mL, Intravenous, PRN, Augustus Fish MD    tamsulosin (FLOMAX) 24 hr capsule 0.4 mg, 0.4 mg, Oral, Nightly, Augustus Fish MD    traZODone (DESYREL) tablet 100 mg, 100 mg, Oral, Nightly, Augustus Fish MD    Data:     Code Status:    There are no questions and answers to display.       Family History   Problem Relation Age of Onset    Colon cancer Neg Hx     Colon polyps Neg Hx        Social History     Socioeconomic History    Marital status: Single   Tobacco Use    Smoking status: Every Day     Current packs/day: 0.25     Types: Cigarettes    Smokeless tobacco: Never   Vaping Use    Vaping status: Some Days    Substances: Nicotine, Flavoring    Devices: Disposable, Pre-filled pod   Substance and Sexual Activity    Alcohol use: No    Drug use: Yes     Types: Methamphetamines    Sexual activity: Yes       Vitals:  Ht 160 cm (63\")   Wt 72.6 kg (160 lb)   BMI 28.34 kg/m²   T 97.5 P 95 R 14 /72 Sp02 96% (room air)          I/O (24Hr):  No intake or output data in the 24 hours ending 06/30/24 0848    Labs and imaging:      No results found for this or any previous visit (from the past 12 hour(s)).                            Physical Examination:        Physical Exam  Vitals and nursing note reviewed.   Constitutional:       " Appearance: Normal appearance.   HENT:      Head: Normocephalic and atraumatic.      Right Ear: External ear normal.      Left Ear: External ear normal.      Nose: Nose normal.      Mouth/Throat:      Mouth: Mucous membranes are dry.      Pharynx: Oropharynx is clear.   Eyes:      Extraocular Movements: Extraocular movements intact.      Conjunctiva/sclera: Conjunctivae normal.      Pupils: Pupils are equal, round, and reactive to light.   Cardiovascular:      Rate and Rhythm: Regular rhythm. Tachycardia present.      Pulses: Normal pulses.      Heart sounds: Normal heart sounds.   Pulmonary:      Effort: Pulmonary effort is normal.      Breath sounds: Normal breath sounds.   Abdominal:      General: Bowel sounds are normal.      Palpations: Abdomen is soft.   Musculoskeletal:      Cervical back: Normal range of motion and neck supple.      Comments: Generalized weakness  BLE weakness   Skin:     General: Skin is warm and dry.   Neurological:      Mental Status: She is alert and oriented to person, place, and time.   Psychiatric:         Mood and Affect: Mood normal.         Behavior: Behavior normal.           Assessment:          * No active hospital problems. *    Past Medical History:   Diagnosis Date    Anxiety     Depressed     GERD (gastroesophageal reflux disease)     Hepatitis C         Plan:        T8-9 MRSA discitis/osteomyelitis  Polysubstance abuse  History of triscupid valve endocarditis  Hepatitis C  Multifactorial anemia  GERD  Anxiety  Depression    Continue current treatment. Monitor counts. Increase activity. Labs Monday. Continue pain control efforts - wean IV dilaudid and add flexeril. Wound care per wound care team. Maintain patient safety. Aggressive therapies as tolerated.       Electronically signed by KELLY Urena on 6/30/2024 at 08:48 CDT

## 2024-07-01 LAB
ANION GAP SERPL CALCULATED.3IONS-SCNC: 8 MMOL/L (ref 5–15)
BASOPHILS # BLD AUTO: 0.02 10*3/MM3 (ref 0–0.2)
BASOPHILS NFR BLD AUTO: 0.4 % (ref 0–1.5)
BUN SERPL-MCNC: 20 MG/DL (ref 6–20)
BUN/CREAT SERPL: 40 (ref 7–25)
CALCIUM SPEC-SCNC: 9.1 MG/DL (ref 8.6–10.5)
CHLORIDE SERPL-SCNC: 103 MMOL/L (ref 98–107)
CK SERPL-CCNC: 19 U/L (ref 20–180)
CO2 SERPL-SCNC: 26 MMOL/L (ref 22–29)
CREAT SERPL-MCNC: 0.5 MG/DL (ref 0.57–1)
CRP SERPL-MCNC: <0.3 MG/DL (ref 0–0.5)
DEPRECATED RDW RBC AUTO: 52.3 FL (ref 37–54)
EGFRCR SERPLBLD CKD-EPI 2021: 126.4 ML/MIN/1.73
EOSINOPHIL # BLD AUTO: 0.22 10*3/MM3 (ref 0–0.4)
EOSINOPHIL NFR BLD AUTO: 4.9 % (ref 0.3–6.2)
ERYTHROCYTE [DISTWIDTH] IN BLOOD BY AUTOMATED COUNT: 16.5 % (ref 12.3–15.4)
ERYTHROCYTE [SEDIMENTATION RATE] IN BLOOD: 50 MM/HR (ref 0–20)
GLUCOSE SERPL-MCNC: 83 MG/DL (ref 65–99)
HCT VFR BLD AUTO: 32.9 % (ref 34–46.6)
HGB BLD-MCNC: 10 G/DL (ref 12–15.9)
IMM GRANULOCYTES # BLD AUTO: 0.02 10*3/MM3 (ref 0–0.05)
IMM GRANULOCYTES NFR BLD AUTO: 0.4 % (ref 0–0.5)
LYMPHOCYTES # BLD AUTO: 1.73 10*3/MM3 (ref 0.7–3.1)
LYMPHOCYTES NFR BLD AUTO: 38.8 % (ref 19.6–45.3)
MCH RBC QN AUTO: 26.3 PG (ref 26.6–33)
MCHC RBC AUTO-ENTMCNC: 30.4 G/DL (ref 31.5–35.7)
MCV RBC AUTO: 86.6 FL (ref 79–97)
MONOCYTES # BLD AUTO: 0.51 10*3/MM3 (ref 0.1–0.9)
MONOCYTES NFR BLD AUTO: 11.4 % (ref 5–12)
NEUTROPHILS NFR BLD AUTO: 1.96 10*3/MM3 (ref 1.7–7)
NEUTROPHILS NFR BLD AUTO: 44.1 % (ref 42.7–76)
NRBC BLD AUTO-RTO: 0 /100 WBC (ref 0–0.2)
PLATELET # BLD AUTO: 467 10*3/MM3 (ref 140–450)
PMV BLD AUTO: 8.6 FL (ref 6–12)
POTASSIUM SERPL-SCNC: 4.1 MMOL/L (ref 3.5–5.2)
RBC # BLD AUTO: 3.8 10*6/MM3 (ref 3.77–5.28)
SODIUM SERPL-SCNC: 137 MMOL/L (ref 136–145)
WBC NRBC COR # BLD AUTO: 4.46 10*3/MM3 (ref 3.4–10.8)

## 2024-07-01 PROCEDURE — 85652 RBC SED RATE AUTOMATED: CPT | Performed by: INTERNAL MEDICINE

## 2024-07-01 PROCEDURE — 85025 COMPLETE CBC W/AUTO DIFF WBC: CPT | Performed by: INTERNAL MEDICINE

## 2024-07-01 PROCEDURE — 97110 THERAPEUTIC EXERCISES: CPT

## 2024-07-01 PROCEDURE — 25010000002 ENOXAPARIN PER 10 MG: Performed by: INTERNAL MEDICINE

## 2024-07-01 PROCEDURE — 25010000002 DAPTOMYCIN PER 1 MG: Performed by: INTERNAL MEDICINE

## 2024-07-01 PROCEDURE — 25010000002 HYDROMORPHONE PER 4 MG: Performed by: NURSE PRACTITIONER

## 2024-07-01 PROCEDURE — 86140 C-REACTIVE PROTEIN: CPT | Performed by: INTERNAL MEDICINE

## 2024-07-01 PROCEDURE — 80048 BASIC METABOLIC PNL TOTAL CA: CPT | Performed by: INTERNAL MEDICINE

## 2024-07-01 PROCEDURE — 63710000001 ONDANSETRON ODT 4 MG TABLET DISPERSIBLE: Performed by: INTERNAL MEDICINE

## 2024-07-01 PROCEDURE — 99232 SBSQ HOSP IP/OBS MODERATE 35: CPT | Performed by: INTERNAL MEDICINE

## 2024-07-01 PROCEDURE — 97535 SELF CARE MNGMENT TRAINING: CPT | Performed by: OCCUPATIONAL THERAPIST

## 2024-07-01 PROCEDURE — 82550 ASSAY OF CK (CPK): CPT | Performed by: INTERNAL MEDICINE

## 2024-07-01 NOTE — PROGRESS NOTES
RYAN Tate APRN        Internal Medicine Progress Note    7/1/2024   08:56 CDT    Name:  Jaqui Kate  MRN:    [id number]     Acct:     [id number]   Room:  [facility] Day: 0     Admit Date: 6/25/2024  6:41 PM  PCP: Vanesa Guevara APRN    Subjective:     C/C: pain    Interval History: Status:  stayed the same.  Resting in bed. No family at bedside. Afebrile. Woke from sleep. Maintaining adequate 02 sats on room air. Tolerating treatment thus far. Still with increased c/o pain at times. Counts stable.     Review of Systems   Constitutional: Positive for malaise/fatigue. Negative for chills, decreased appetite, weight gain and weight loss.   HENT:  Negative for congestion, ear discharge, hoarse voice and tinnitus.    Eyes:  Negative for blurred vision, discharge, visual disturbance and visual halos.   Cardiovascular:  Negative for chest pain, claudication, dyspnea on exertion, irregular heartbeat, leg swelling, orthopnea and paroxysmal nocturnal dyspnea.   Respiratory:  Negative for cough, shortness of breath, sputum production and wheezing.    Endocrine: Negative for cold intolerance, heat intolerance and polyuria.   Hematologic/Lymphatic: Negative for adenopathy. Does not bruise/bleed easily.   Skin:  Negative for dry skin, itching and suspicious lesions.   Musculoskeletal:  Positive for joint pain, muscle weakness and myalgias. Negative for arthritis, back pain and falls.   Gastrointestinal:  Negative for abdominal pain, constipation, diarrhea, dysphagia and hematemesis.   Genitourinary:  Negative for bladder incontinence, dysuria and frequency.   Neurological:  Positive for focal weakness and weakness. Negative for aphonia, disturbances in coordination and dizziness.   Psychiatric/Behavioral:  Positive for substance abuse. Negative for altered mental status and depression. The patient is nervous/anxious. The patient does not have insomnia.          Medications:      Allergies: No Known Allergies    Current Meds:   Current Facility-Administered Medications:     acetaminophen (TYLENOL) tablet 650 mg, 650 mg, Oral, Q4H PRN **OR** acetaminophen (TYLENOL) suppository 650 mg, 650 mg, Rectal, Q4H PRN, Augustus Fish MD    bisacodyl (DULCOLAX) suppository 10 mg, 10 mg, Rectal, Daily PRN, Augustus Fish MD    cyclobenzaprine (FLEXERIL) tablet 10 mg, 10 mg, Oral, Q8H PRN, Qing Jimenez, KELLY    DAPTOmycin (CUBICIN) 600 mg in sodium chloride 0.9 % 50 mL IVPB, 8 mg/kg, Intravenous, Q24H, Augustus Fish MD    diphenhydrAMINE (BENADRYL) capsule 25 mg, 25 mg, Oral, Q6H PRN, Augustus Fish MD    Enoxaparin Sodium (LOVENOX) syringe 40 mg, 40 mg, Subcutaneous, Q24H, Augustus Fish MD    gabapentin (NEURONTIN) capsule 300 mg, 300 mg, Oral, TID, Augustus Fish MD    guaiFENesin (MUCINEX) 12 hr tablet 1,200 mg, 1,200 mg, Oral, Q12H PRN, Augustus Fish MD    HYDROmorphone (DILAUDID) injection 0.2 mg ** ENDS ON 07/01 -- DO NOT REORDER **, 0.2 mg, Intravenous, Q8H PRN, Qing Jimenez, KELLY    ibuprofen (ADVIL,MOTRIN) tablet 400 mg, 400 mg, Oral, Q8H PRN, Augustus Fish MD    lactobacillus acidophilus (RISAQUAD) capsule 2 capsule, 2 capsule, Oral, Daily, Augustus Fish MD    Lidocaine 4 % 1 patch, 1 patch, Transdermal, Q24H, Augustus Fish MD    magnesium hydroxide (MILK OF MAGNESIA) 400 MG/5ML suspension 30 mL, 30 mL, Oral, BID PRN, Augustus Fish MD    melatonin tablet 5 mg, 5 mg, Oral, Nightly, Augustus Fish MD    nicotine (NICODERM CQ) 21 MG/24HR patch 1 patch, 1 patch, Transdermal, Q24H, Augustus Fish MD    OLANZapine (zyPREXA) tablet 5 mg, 5 mg, Oral, Q6H PRN, Augustus Fish MD    ondansetron ODT (ZOFRAN-ODT) disintegrating tablet 4 mg, 4 mg, Oral, Q6H PRN **OR** ondansetron (ZOFRAN) injection 4 mg, 4 mg, Intravenous, Q6H PRN, Augustus Fish MD    oxyCODONE  "(ROXICODONE) immediate release tablet 20 mg, 20 mg, Oral, Q4H PRN, Qing Jimenez APRN    pantoprazole (PROTONIX) EC tablet 40 mg, 40 mg, Oral, Daily, Augustus Fish MD    promethazine (PHENERGAN) tablet 12.5 mg, 12.5 mg, Oral, Q6H PRN, Augustus Fish MD    rifAMPin (RIFADIN) capsule 300 mg, 300 mg, Oral, Q12H, Augustus Fish MD    sennosides-docusate (PERICOLACE) 8.6-50 MG per tablet 1 tablet, 1 tablet, Oral, BID, Augustus Fish MD    sodium chloride 0.9 % bolus 250 mL, 250 mL, Intravenous, PRN, Augustus Fish MD    sodium chloride 0.9 % flush 10 mL, 10 mL, Intravenous, Q12H, Augustus Fish MD    sodium chloride 0.9 % flush 10 mL, 10 mL, Intravenous, PRN, Augustus Fish MD    tamsulosin (FLOMAX) 24 hr capsule 0.4 mg, 0.4 mg, Oral, Nightly, Augustus Fish MD    traZODone (DESYREL) tablet 100 mg, 100 mg, Oral, Nightly, Augustus Fish MD    Data:     Code Status:    There are no questions and answers to display.       Family History   Problem Relation Age of Onset    Colon cancer Neg Hx     Colon polyps Neg Hx        Social History     Socioeconomic History    Marital status: Single   Tobacco Use    Smoking status: Every Day     Current packs/day: 0.25     Types: Cigarettes    Smokeless tobacco: Never   Vaping Use    Vaping status: Some Days    Substances: Nicotine, Flavoring    Devices: Disposable, Pre-filled pod   Substance and Sexual Activity    Alcohol use: No    Drug use: Yes     Types: Methamphetamines    Sexual activity: Yes       Vitals:  Ht 160 cm (63\")   Wt 72.6 kg (160 lb)   BMI 28.34 kg/m²   T 97.6 P 97 R 14 /78 Sp02 95% (room air)          I/O (24Hr):  No intake or output data in the 24 hours ending 07/01/24 0856    Labs and imaging:      Recent Results (from the past 12 hour(s))   Basic Metabolic Panel    Collection Time: 07/01/24  6:08 AM    Specimen: Blood   Result Value Ref Range    Glucose 83 65 - 99 mg/dL    BUN " 20 6 - 20 mg/dL    Creatinine 0.50 (L) 0.57 - 1.00 mg/dL    Sodium 137 136 - 145 mmol/L    Potassium 4.1 3.5 - 5.2 mmol/L    Chloride 103 98 - 107 mmol/L    CO2 26.0 22.0 - 29.0 mmol/L    Calcium 9.1 8.6 - 10.5 mg/dL    BUN/Creatinine Ratio 40.0 (H) 7.0 - 25.0    Anion Gap 8.0 5.0 - 15.0 mmol/L    eGFR 126.4 >60.0 mL/min/1.73   Sedimentation Rate    Collection Time: 07/01/24  6:08 AM    Specimen: Blood   Result Value Ref Range    Sed Rate 50 (H) 0 - 20 mm/hr   C-reactive Protein    Collection Time: 07/01/24  6:08 AM    Specimen: Blood   Result Value Ref Range    C-Reactive Protein <0.30 0.00 - 0.50 mg/dL   CK    Collection Time: 07/01/24  6:08 AM    Specimen: Blood   Result Value Ref Range    Creatine Kinase 19 (L) 20 - 180 U/L   CBC Auto Differential    Collection Time: 07/01/24  6:08 AM    Specimen: Blood   Result Value Ref Range    WBC 4.46 3.40 - 10.80 10*3/mm3    RBC 3.80 3.77 - 5.28 10*6/mm3    Hemoglobin 10.0 (L) 12.0 - 15.9 g/dL    Hematocrit 32.9 (L) 34.0 - 46.6 %    MCV 86.6 79.0 - 97.0 fL    MCH 26.3 (L) 26.6 - 33.0 pg    MCHC 30.4 (L) 31.5 - 35.7 g/dL    RDW 16.5 (H) 12.3 - 15.4 %    RDW-SD 52.3 37.0 - 54.0 fl    MPV 8.6 6.0 - 12.0 fL    Platelets 467 (H) 140 - 450 10*3/mm3    Neutrophil % 44.1 42.7 - 76.0 %    Lymphocyte % 38.8 19.6 - 45.3 %    Monocyte % 11.4 5.0 - 12.0 %    Eosinophil % 4.9 0.3 - 6.2 %    Basophil % 0.4 0.0 - 1.5 %    Immature Grans % 0.4 0.0 - 0.5 %    Neutrophils, Absolute 1.96 1.70 - 7.00 10*3/mm3    Lymphocytes, Absolute 1.73 0.70 - 3.10 10*3/mm3    Monocytes, Absolute 0.51 0.10 - 0.90 10*3/mm3    Eosinophils, Absolute 0.22 0.00 - 0.40 10*3/mm3    Basophils, Absolute 0.02 0.00 - 0.20 10*3/mm3    Immature Grans, Absolute 0.02 0.00 - 0.05 10*3/mm3    nRBC 0.0 0.0 - 0.2 /100 WBC                               Physical Examination:        Physical Exam  Vitals and nursing note reviewed.   Constitutional:       Appearance: Normal appearance.   HENT:      Head: Normocephalic and  atraumatic.      Right Ear: External ear normal.      Left Ear: External ear normal.      Nose: Nose normal.      Mouth/Throat:      Mouth: Mucous membranes are dry.      Pharynx: Oropharynx is clear.   Eyes:      Extraocular Movements: Extraocular movements intact.      Conjunctiva/sclera: Conjunctivae normal.      Pupils: Pupils are equal, round, and reactive to light.   Cardiovascular:      Rate and Rhythm: Normal rate and regular rhythm.      Pulses: Normal pulses.      Heart sounds: Normal heart sounds.   Pulmonary:      Effort: Pulmonary effort is normal.      Breath sounds: Normal breath sounds.   Abdominal:      General: Bowel sounds are normal.      Palpations: Abdomen is soft.   Musculoskeletal:      Cervical back: Normal range of motion and neck supple.      Comments: Generalized weakness  BLE weakness   Skin:     General: Skin is warm and dry.   Neurological:      Mental Status: She is alert and oriented to person, place, and time.   Psychiatric:         Mood and Affect: Mood normal.         Behavior: Behavior normal.           Assessment:          * No active hospital problems. *    Past Medical History:   Diagnosis Date    Anxiety     Depressed     GERD (gastroesophageal reflux disease)     Hepatitis C         Plan:        T8-9 MRSA discitis/osteomyelitis  Polysubstance abuse  History of triscupid valve endocarditis  Hepatitis C  Multifactorial anemia  GERD  Anxiety  Depression    Continue current treatment. Monitor counts. Increase activity. Labs Thursday. Continue pain control efforts - will continue to wean pain medication. Wound care per wound care team. Maintain patient safety. Aggressive therapies as tolerated.       Electronically signed by KELLY Jones on 7/1/2024 at 08:56 CDT     I have discussed the care of Jaqui Kate, including pertinent history and exam findings, with the nurse practitioner.    I have seen and examined the patient and the key elements of all parts of the  encounter have been performed by me.  I agree with the assessment, plan and orders as documented by KELLY Philip, after I modified the exam findings and the plan of treatments and the final version is my approved version of the assessment.        Electronically signed by Augustus Fish MD on 7/1/2024 at 22:23 CDT

## 2024-07-01 NOTE — PROGRESS NOTES
"INFECTIOUS DISEASES PROGRESS NOTE    Patient:  Jaqui Kate  YOB: 1990  MRN: [id number]   Admit date: 6/25/2024   Admitting Physician: Augustus Fish MD  Primary Care Physician: Vanesa Guevara APRN    Chief Complaint: Pain      Interval History: Patient tolerating antibiotics.  Only complaint is that of pain.  Reviewed with nursing.  Patient has no skin breakdown or wound.  Surgical site healing well.      Allergies: No Known Allergies    Current Scheduled Medications:   DAPTOmycin, 8 mg/kg, Intravenous, Q24H  enoxaparin, 40 mg, Subcutaneous, Q24H  gabapentin, 300 mg, Oral, TID  lactobacillus acidophilus, 2 capsule, Oral, Daily  Lidocaine, 1 patch, Transdermal, Q24H  melatonin, 5 mg, Oral, Nightly  nicotine, 1 patch, Transdermal, Q24H  pantoprazole, 40 mg, Oral, Daily  rifAMPin, 300 mg, Oral, Q12H  senna-docusate sodium, 1 tablet, Oral, BID  sodium chloride, 10 mL, Intravenous, Q12H  tamsulosin, 0.4 mg, Oral, Nightly  traZODone, 100 mg, Oral, Nightly      Current PRN Medications:    acetaminophen **OR** acetaminophen    bisacodyl    cyclobenzaprine    diphenhydrAMINE    guaiFENesin    ibuprofen    magnesium hydroxide    OLANZapine    ondansetron ODT **OR** ondansetron    oxyCODONE    promethazine    sodium chloride    sodium chloride            Objective     Vital Signs:  No data recorded.      Ht 160 cm (63\")   Wt 72.6 kg (160 lb)   BMI 28.34 kg/m²     Temperature 97.6    heart rate 97      Respiratory rate 14     blood pressure 126/78    Physical Exam:  General: Patient is lying in bed in no acute distress  Respiratory: Effort even and unlabored  Right upper extremity PICC line dressing clean dry and intact      Results Review:    I reviewed the patient's new clinical results.    Lab Results:    CBC:   Lab Results   Lab 06/26/24  0327 07/01/24  0608   WBC 6.59 4.46   HEMOGLOBIN 9.8* 10.0*   HEMATOCRIT 32.0* 32.9*   PLATELETS 585* 467*        AutoDiff:   Lab Results   Lab " 06/26/24 0327 07/01/24  0608   NEUTROPHIL % 54.9 44.1   LYMPHOCYTE % 31.7 38.8   MONOCYTES % 8.6 11.4   EOSINOPHIL % 3.8 4.9   BASOPHIL % 0.5 0.4   NEUTROS ABS 3.62 1.96   LYMPHS ABS 2.09 1.73   MONOS ABS 0.57 0.51   EOS ABS 0.25 0.22   BASOS ABS 0.03 0.02        Manual Diff:    Lab Results   Lab 06/26/24 0327 07/01/24  0608   NEUTROS ABS 3.62 1.96           CMP:   Lab Results   Lab 06/26/24 0327 07/01/24  0608   SODIUM 138 137   POTASSIUM 3.9 4.1   CHLORIDE 101 103   CO2 28.0 26.0   BUN 18 20   CREATININE 0.72 0.50*   CALCIUM 9.2 9.1   BILIRUBIN 0.2  --    ALK PHOS 202*  --    ALT (SGPT) 25  --    AST (SGOT) 16  --    GLUCOSE 101* 83       Estimated Creatinine Clearance: 151.4 mL/min (A) (by C-G formula based on SCr of 0.5 mg/dL (L)).      C-Reactive Protein   Date Value Ref Range Status   07/01/2024 <0.30 0.00 - 0.50 mg/dL Final     Sed Rate   Date Value Ref Range Status   07/01/2024 50 (H) 0 - 20 mm/hr Final       Culture Results:    Microbiology Results (last 10 days)       ** No results found for the last 240 hours. **               CPK          7/1/2024    06:08   Common Labs   Creatine Kinase 19       Radiology:   Imaging Results (Last 72 Hours)       ** No results found for the last 72 hours. **                There are no active hospital problems to display for this patient.      IMPRESSION:  MRSA discitis/osteomyelitis/epidural abscess thoracic spine status postsurgical drainage Luna [facility] at [facility] in [place].  Patient working with physical therapy but is nonambulatory and that was prior to this admission  Recurrent MRSA bacteremia-patient had bacteremia in January 2024, again in March 2024 and then again when presented back to hospital in June.  Blood cultures reported to have cleared Luna 10 at [place]   History of tricuspid valve endocarditis with septic pulmonary emboli  History of hepatitis C untreated.      RECOMMENDATION:   Continue daptomycin-current plan is through July  20  Continue oral rifampin-current plan is through July 20  Will need periodic creatinine kinase monitoring given daptomycin use-remains low  Continue to encourage patient to work with therapy  Discussed with patient oral suppressive therapy for life-per review of cultures she has options to include tetracycline/doxycycline/minocycline, clindamycin and Bactrim        Debby Gastelum MD  07/01/24  12:36 CDT

## 2024-07-02 PROCEDURE — 97110 THERAPEUTIC EXERCISES: CPT

## 2024-07-02 PROCEDURE — 97530 THERAPEUTIC ACTIVITIES: CPT

## 2024-07-02 PROCEDURE — 97535 SELF CARE MNGMENT TRAINING: CPT | Performed by: OCCUPATIONAL THERAPIST

## 2024-07-02 PROCEDURE — 25010000002 ENOXAPARIN PER 10 MG: Performed by: INTERNAL MEDICINE

## 2024-07-02 PROCEDURE — 25010000002 DAPTOMYCIN PER 1 MG: Performed by: INTERNAL MEDICINE

## 2024-07-02 RX ORDER — NAPROXEN 250 MG/1
250 TABLET ORAL EVERY 8 HOURS SCHEDULED
Status: DISCONTINUED | OUTPATIENT
Start: 2024-07-02 | End: 2024-07-20 | Stop reason: HOSPADM

## 2024-07-02 NOTE — PROGRESS NOTES
"Critical access hospitalBrooke Fish M.D.  KELLY Philip        Internal Medicine Progress Note    7/2/2024   11:07 CDT    Name:  Jaqui Kate  MRN:    [id number]     Acct:     [id number]   Room:  [facility] Day: 0     Admit Date: 6/25/2024  6:41 PM  PCP: Vanesa Guevara APRN    Subjective:     C/C: pain    Interval History: Status:  stayed the same.  Resting in bed. Mother at bedside. Afebrile. Maintaining adequate 02 sats on room air. Tolerating treatment thus far. Still with increased c/o pain at times - requesting \"something longer acting\" as her current prn pain medication is \"wearing off about an hour and a half before it's due again.\" Again reviewed with the patient and her mother than she is receiving fairly exceptionally high doses of narcotic pain medication in addition to muscle relaxers and gabapentin and while the regimen may be adjusted, it will not be increased.     Review of Systems   Constitutional: Positive for malaise/fatigue. Negative for chills, decreased appetite, weight gain and weight loss.   HENT:  Negative for congestion, ear discharge, hoarse voice and tinnitus.    Eyes:  Negative for blurred vision, discharge, visual disturbance and visual halos.   Cardiovascular:  Negative for chest pain, claudication, dyspnea on exertion, irregular heartbeat, leg swelling, orthopnea and paroxysmal nocturnal dyspnea.   Respiratory:  Negative for cough, shortness of breath, sputum production and wheezing.    Endocrine: Negative for cold intolerance, heat intolerance and polyuria.   Hematologic/Lymphatic: Negative for adenopathy. Does not bruise/bleed easily.   Skin:  Negative for dry skin, itching and suspicious lesions.   Musculoskeletal:  Positive for joint pain, muscle weakness and myalgias. Negative for arthritis, back pain and falls.   Gastrointestinal:  Negative for abdominal pain, constipation, diarrhea, dysphagia and hematemesis.   Genitourinary:  Negative for bladder incontinence, " dysuria and frequency.   Neurological:  Positive for focal weakness and weakness. Negative for aphonia, disturbances in coordination and dizziness.   Psychiatric/Behavioral:  Positive for substance abuse. Negative for altered mental status and depression. The patient is nervous/anxious. The patient does not have insomnia.          Medications:     Allergies: No Known Allergies    Current Meds:   Current Facility-Administered Medications:     acetaminophen (TYLENOL) tablet 650 mg, 650 mg, Oral, Q4H PRN **OR** acetaminophen (TYLENOL) suppository 650 mg, 650 mg, Rectal, Q4H PRN, Augustus Fish MD    bisacodyl (DULCOLAX) suppository 10 mg, 10 mg, Rectal, Daily PRN, Augustus Fish MD    cyclobenzaprine (FLEXERIL) tablet 10 mg, 10 mg, Oral, Q8H PRN, Qing Jimenez APRN    DAPTOmycin (CUBICIN) 600 mg in sodium chloride 0.9 % 50 mL IVPB, 8 mg/kg, Intravenous, Q24H, Augustus Fish MD    diphenhydrAMINE (BENADRYL) capsule 25 mg, 25 mg, Oral, Q6H PRN, Augustus Fish MD    Enoxaparin Sodium (LOVENOX) syringe 40 mg, 40 mg, Subcutaneous, Q24H, Augustus Fish MD    gabapentin (NEURONTIN) capsule 300 mg, 300 mg, Oral, TID, Augustus Fish MD    guaiFENesin (MUCINEX) 12 hr tablet 1,200 mg, 1,200 mg, Oral, Q12H PRN, Augustus Fish MD    ibuprofen (ADVIL,MOTRIN) tablet 400 mg, 400 mg, Oral, Q8H PRN, Augustus Fish MD    lactobacillus acidophilus (RISAQUAD) capsule 2 capsule, 2 capsule, Oral, Daily, Augustus Fish MD    Lidocaine 4 % 1 patch, 1 patch, Transdermal, Q24H, Augustus Fish MD    magnesium hydroxide (MILK OF MAGNESIA) 400 MG/5ML suspension 30 mL, 30 mL, Oral, BID PRN, Augustus Fish MD    melatonin tablet 5 mg, 5 mg, Oral, Nightly, Augustus Fish MD    nicotine (NICODERM CQ) 21 MG/24HR patch 1 patch, 1 patch, Transdermal, Q24H, Augustus Fish MD    OLANZapine (zyPREXA) tablet 5 mg, 5 mg, Oral, Q6H PRN, Abe  "Augustus Sanford MD    ondansetron ODT (ZOFRAN-ODT) disintegrating tablet 4 mg, 4 mg, Oral, Q6H PRN **OR** ondansetron (ZOFRAN) injection 4 mg, 4 mg, Intravenous, Q6H PRN, Augustus Fish MD    oxyCODONE (ROXICODONE) immediate release tablet 20 mg, 20 mg, Oral, Q4H PRN, Qing Jimenez APRN    pantoprazole (PROTONIX) EC tablet 40 mg, 40 mg, Oral, Daily, Augustus Fish MD    promethazine (PHENERGAN) tablet 12.5 mg, 12.5 mg, Oral, Q6H PRN, Augustus Fish MD    rifAMPin (RIFADIN) capsule 300 mg, 300 mg, Oral, Q12H, Augustus Fish MD    sennosides-docusate (PERICOLACE) 8.6-50 MG per tablet 1 tablet, 1 tablet, Oral, BID, Augustus Fish MD    sodium chloride 0.9 % bolus 250 mL, 250 mL, Intravenous, PRN, Augustus Fish MD    sodium chloride 0.9 % flush 10 mL, 10 mL, Intravenous, Q12H, Augustus Fish MD    sodium chloride 0.9 % flush 10 mL, 10 mL, Intravenous, PRN, Augustus Fish MD    tamsulosin (FLOMAX) 24 hr capsule 0.4 mg, 0.4 mg, Oral, Nightly, Augustus Fish MD    traZODone (DESYREL) tablet 100 mg, 100 mg, Oral, Nightly, Augustus Fish MD    Data:     Code Status:    There are no questions and answers to display.       Family History   Problem Relation Age of Onset    Colon cancer Neg Hx     Colon polyps Neg Hx        Social History     Socioeconomic History    Marital status: Single   Tobacco Use    Smoking status: Every Day     Current packs/day: 0.25     Types: Cigarettes    Smokeless tobacco: Never   Vaping Use    Vaping status: Some Days    Substances: Nicotine, Flavoring    Devices: Disposable, Pre-filled pod   Substance and Sexual Activity    Alcohol use: No    Drug use: Yes     Types: Methamphetamines    Sexual activity: Yes       Vitals:  Ht 160 cm (63\")   Wt 72.6 kg (160 lb)   BMI 28.34 kg/m²   T 97.6 P 85 R 16 /73 Sp02 97% (room air)          I/O (24Hr):  No intake or output data in the 24 hours ending 07/02/24 " 1107    Labs and imaging:      No results found for this or any previous visit (from the past 12 hour(s)).                              Physical Examination:        Physical Exam  Vitals and nursing note reviewed.   Constitutional:       Appearance: Normal appearance.   HENT:      Head: Normocephalic and atraumatic.      Right Ear: External ear normal.      Left Ear: External ear normal.      Nose: Nose normal.      Mouth/Throat:      Mouth: Mucous membranes are dry.      Pharynx: Oropharynx is clear.   Eyes:      Extraocular Movements: Extraocular movements intact.      Conjunctiva/sclera: Conjunctivae normal.      Pupils: Pupils are equal, round, and reactive to light.   Cardiovascular:      Rate and Rhythm: Normal rate and regular rhythm.      Pulses: Normal pulses.      Heart sounds: Normal heart sounds.   Pulmonary:      Effort: Pulmonary effort is normal.      Breath sounds: Normal breath sounds.   Abdominal:      General: Bowel sounds are normal.      Palpations: Abdomen is soft.   Musculoskeletal:      Cervical back: Normal range of motion and neck supple.      Comments: Generalized weakness  BLE weakness   Skin:     General: Skin is warm and dry.   Neurological:      Mental Status: She is alert and oriented to person, place, and time.   Psychiatric:         Mood and Affect: Mood normal.         Behavior: Behavior normal.           Assessment:          * No active hospital problems. *    Past Medical History:   Diagnosis Date    Anxiety     Depressed     GERD (gastroesophageal reflux disease)     Hepatitis C         Plan:        T8-9 MRSA discitis/osteomyelitis  Polysubstance abuse  History of triscupid valve endocarditis  Hepatitis C  Multifactorial anemia  GERD  Anxiety  Depression    Continue current treatment. Monitor counts. Increase activity. Labs in am. Continue pain control efforts - will continue to wean pain medication. Wound care per wound care team. Maintain patient safety. Aggressive therapies  as tolerated. Continue IV antibiotics under direction of ID.       Electronically signed by KELLY Jones on 7/2/2024 at 11:07 CDT     I have discussed the care of Jaqui Kate, including pertinent history and exam findings, with the nurse practitioner.    I have seen and examined the patient and the key elements of all parts of the encounter have been performed by me.  I agree with the assessment, plan and orders as documented by KELYL Philip, after I modified the exam findings and the plan of treatments and the final version is my approved version of the assessment.        Electronically signed by Augustus Fish MD on 7/2/2024 at 13:37 CDT

## 2024-07-03 LAB
ANION GAP SERPL CALCULATED.3IONS-SCNC: 7 MMOL/L (ref 5–15)
BASOPHILS # BLD AUTO: 0.02 10*3/MM3 (ref 0–0.2)
BASOPHILS NFR BLD AUTO: 0.4 % (ref 0–1.5)
BUN SERPL-MCNC: 19 MG/DL (ref 6–20)
BUN/CREAT SERPL: 29.2 (ref 7–25)
CALCIUM SPEC-SCNC: 9 MG/DL (ref 8.6–10.5)
CHLORIDE SERPL-SCNC: 103 MMOL/L (ref 98–107)
CO2 SERPL-SCNC: 29 MMOL/L (ref 22–29)
CREAT SERPL-MCNC: 0.65 MG/DL (ref 0.57–1)
CRP SERPL-MCNC: <0.3 MG/DL (ref 0–0.5)
DEPRECATED RDW RBC AUTO: 51.8 FL (ref 37–54)
EGFRCR SERPLBLD CKD-EPI 2021: 118.7 ML/MIN/1.73
EOSINOPHIL # BLD AUTO: 0.27 10*3/MM3 (ref 0–0.4)
EOSINOPHIL NFR BLD AUTO: 5.4 % (ref 0.3–6.2)
ERYTHROCYTE [DISTWIDTH] IN BLOOD BY AUTOMATED COUNT: 16.5 % (ref 12.3–15.4)
GLUCOSE SERPL-MCNC: 98 MG/DL (ref 65–99)
HCT VFR BLD AUTO: 32.6 % (ref 34–46.6)
HGB BLD-MCNC: 9.9 G/DL (ref 12–15.9)
IMM GRANULOCYTES # BLD AUTO: 0.02 10*3/MM3 (ref 0–0.05)
IMM GRANULOCYTES NFR BLD AUTO: 0.4 % (ref 0–0.5)
LYMPHOCYTES # BLD AUTO: 1.71 10*3/MM3 (ref 0.7–3.1)
LYMPHOCYTES NFR BLD AUTO: 33.9 % (ref 19.6–45.3)
MCH RBC QN AUTO: 25.9 PG (ref 26.6–33)
MCHC RBC AUTO-ENTMCNC: 30.4 G/DL (ref 31.5–35.7)
MCV RBC AUTO: 85.3 FL (ref 79–97)
MONOCYTES # BLD AUTO: 0.53 10*3/MM3 (ref 0.1–0.9)
MONOCYTES NFR BLD AUTO: 10.5 % (ref 5–12)
NEUTROPHILS NFR BLD AUTO: 2.49 10*3/MM3 (ref 1.7–7)
NEUTROPHILS NFR BLD AUTO: 49.4 % (ref 42.7–76)
NRBC BLD AUTO-RTO: 0 /100 WBC (ref 0–0.2)
PLATELET # BLD AUTO: 420 10*3/MM3 (ref 140–450)
PMV BLD AUTO: 8.9 FL (ref 6–12)
POTASSIUM SERPL-SCNC: 4.1 MMOL/L (ref 3.5–5.2)
RBC # BLD AUTO: 3.82 10*6/MM3 (ref 3.77–5.28)
SODIUM SERPL-SCNC: 139 MMOL/L (ref 136–145)
WBC NRBC COR # BLD AUTO: 5.04 10*3/MM3 (ref 3.4–10.8)

## 2024-07-03 PROCEDURE — 25010000002 DAPTOMYCIN PER 1 MG: Performed by: INTERNAL MEDICINE

## 2024-07-03 PROCEDURE — 86140 C-REACTIVE PROTEIN: CPT | Performed by: INTERNAL MEDICINE

## 2024-07-03 PROCEDURE — 97530 THERAPEUTIC ACTIVITIES: CPT

## 2024-07-03 PROCEDURE — 85025 COMPLETE CBC W/AUTO DIFF WBC: CPT | Performed by: INTERNAL MEDICINE

## 2024-07-03 PROCEDURE — 80048 BASIC METABOLIC PNL TOTAL CA: CPT | Performed by: INTERNAL MEDICINE

## 2024-07-03 PROCEDURE — 97110 THERAPEUTIC EXERCISES: CPT

## 2024-07-03 PROCEDURE — 99232 SBSQ HOSP IP/OBS MODERATE 35: CPT | Performed by: INTERNAL MEDICINE

## 2024-07-03 PROCEDURE — 25010000002 ENOXAPARIN PER 10 MG: Performed by: INTERNAL MEDICINE

## 2024-07-03 RX ORDER — OXYCODONE HYDROCHLORIDE 5 MG/1
20 TABLET ORAL EVERY 4 HOURS PRN
Status: DISCONTINUED | OUTPATIENT
Start: 2024-07-03 | End: 2024-07-07

## 2024-07-03 NOTE — PROGRESS NOTES
"INFECTIOUS DISEASES PROGRESS NOTE    Patient:  Jaqui Kate  YOB: 1990  MRN: [id number]   Admit date: 6/25/2024   Admitting Physician: Augustus Fish MD  Primary Care Physician: Vanesa Guevara APRN    Chief Complaint: None offered    Interval History: Patient was able to go outside some in a wheelchair with physical therapy assistant and was very grateful to go outside.    Allergies: No Known Allergies    Current Scheduled Medications:   DAPTOmycin, 8 mg/kg, Intravenous, Q24H  enoxaparin, 40 mg, Subcutaneous, Q24H  gabapentin, 300 mg, Oral, TID  lactobacillus acidophilus, 2 capsule, Oral, Daily  Lidocaine, 1 patch, Transdermal, Q24H  melatonin, 5 mg, Oral, Nightly  naproxen, 250 mg, Oral, Q8H  nicotine, 1 patch, Transdermal, Q24H  pantoprazole, 40 mg, Oral, Daily  rifAMPin, 300 mg, Oral, Q12H  senna-docusate sodium, 1 tablet, Oral, BID  sodium chloride, 10 mL, Intravenous, Q12H  tamsulosin, 0.4 mg, Oral, Nightly  traZODone, 100 mg, Oral, Nightly      Current PRN Medications:    acetaminophen **OR** acetaminophen    bisacodyl    cyclobenzaprine    diphenhydrAMINE    guaiFENesin    magnesium hydroxide    OLANZapine    ondansetron ODT **OR** ondansetron    oxyCODONE    promethazine    sodium chloride    sodium chloride            Objective     Vital Signs:  No data recorded.      Ht 160 cm (63\")   Wt 72.6 kg (160 lb)   BMI 28.34 kg/m²     Temperature 98.2    heart rate 94      Respiratory rate 18   blood pressure 116/74    Physical Exam:  General: Patient is fairly well-appearing sitting in a wheelchair in no acute distress.  Her mother is with her.  Respiratory: Effort even and unlabored, she was not conversationally dyspneic  Psych: She is pleasant and cooperative      Results Review:    I reviewed the patient's new clinical results.    Lab Results:    CBC:   Lab Results   Lab 07/01/24  0608 07/03/24  0401   WBC 4.46 5.04   HEMOGLOBIN 10.0* 9.9*   HEMATOCRIT 32.9* 32.6*   PLATELETS 467* " 420        AutoDiff:   Lab Results   Lab 07/01/24  0608 07/03/24  0401   NEUTROPHIL % 44.1 49.4   LYMPHOCYTE % 38.8 33.9   MONOCYTES % 11.4 10.5   EOSINOPHIL % 4.9 5.4   BASOPHIL % 0.4 0.4   NEUTROS ABS 1.96 2.49   LYMPHS ABS 1.73 1.71   MONOS ABS 0.51 0.53   EOS ABS 0.22 0.27   BASOS ABS 0.02 0.02        Manual Diff:    Lab Results   Lab 07/01/24  0608 07/03/24  0401   NEUTROS ABS 1.96 2.49           CMP:   Lab Results   Lab 07/01/24  0608 07/03/24  0401   SODIUM 137 139   POTASSIUM 4.1 4.1   CHLORIDE 103 103   CO2 26.0 29.0   BUN 20 19   CREATININE 0.50* 0.65   CALCIUM 9.1 9.0   GLUCOSE 83 98       Estimated Creatinine Clearance: 116.5 mL/min (by C-G formula based on SCr of 0.65 mg/dL).      C-Reactive Protein   Date Value Ref Range Status   07/03/2024 <0.30 0.00 - 0.50 mg/dL Final   07/01/2024 <0.30 0.00 - 0.50 mg/dL Final     Sed Rate   Date Value Ref Range Status   07/01/2024 50 (H) 0 - 20 mm/hr Final       Culture Results:    Microbiology Results (last 10 days)       ** No results found for the last 240 hours. **               CPK          7/1/2024    06:08   Common Labs   Creatine Kinase 19       Radiology:   Imaging Results (Last 72 Hours)       ** No results found for the last 72 hours. **                There are no active hospital problems to display for this patient.      IMPRESSION:  MRSA discitis/osteomyelitis/epidural abscess thoracic spine status postsurgical drainage Luna 10 at [facility] in [place].  Patient working with physical therapy but is nonambulatory and that was prior to this admission  Recurrent MRSA bacteremia-patient had bacteremia in January 2024, again in March 2024 and then again when presented back to hospital in June.  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      RECOMMENDATION:   Continue daptomycin-current plan is through July 20 and patient  is aware  Continue oral rifampin-current plan is through July 20 and patient is aware  Will need periodic creatinine kinase monitoring given daptomycin use.  I have ordered for Monday  Plan after completion of intravenous daptomycin will be for lifelong suppression with oral antibiotics.  Per review of cultures she has options to include tetracycline/doxycycline/minocycline, clindamycin and Bactrim        Debby Gastelum MD  07/03/24  15:24 CDT

## 2024-07-03 NOTE — PROGRESS NOTES
Inpatient Nutrition Services  Patient Name:  Jaqui Kate  YOB: 1990  MRN: [id number]  Admit Date:  6/25/2024  Assessment Date:  7/3/2024   Reason for Assessment       Row Name 07/03/24 1344          Reason for Assessment    Reason For Assessment follow-up protocol     Diagnosis infection/sepsis                    Nutrition/Diet History       Row Name 07/03/24 1344          Nutrition/Diet History    Typical Intake (Food/Fluid/EN/PN) PO adequate. 93% of meals, 1680 mL oral fluid/day. BM yesterday. Receiving daily menu selections. Continue current nutrition care plan.     Food Intolerance(s) NKFA                    Labs/Tests/Procedures/Meds       Row Name 07/03/24 1345          Labs/Procedures/Meds    Lab Results Reviewed reviewed        Diagnostic Tests/Procedures    Diagnostic Test/Procedure Reviewed reviewed        Medications    Pertinent Medications Reviewed reviewed                    Physical Findings       Row Name 07/03/24 1345          Physical Findings    Overall Physical Appearance Room air, BM 7/1, surgical incision to spine                    Estimated/Assessed Needs - Anthropometrics       Row Name 07/03/24 1345          Anthropometrics    Weight for Calculation 72.6 kg (160 lb)        Estimated/Assessed Needs    Additional Documentation Fluid Requirements (Group);KCAL/KG (Group);Protein Requirements (Group)        KCAL/KG    KCAL/KG 25 Kcal/Kg (kcal)     25 Kcal/Kg (kcal) 1814.4        Protein Requirements    Weight Used For Protein Calculations 72.6 kg (160 lb)     Est Protein Requirement Amount (gms/kg) 1.2 gm protein     Estimated Protein Requirements (gms/day) 87.09        Fluid Requirements    Fluid Requirements (mL/day) 1814     RDA Method (mL) 1814                    Nutrition Prescription Ordered       Row Name 07/03/24 1345          Nutrition Prescription PO    Current PO Diet Regular     Fluid Consistency Thin                    Evaluation of Received Nutrient/Fluid Intake        Row Name 07/03/24 1345          Nutrient/Fluid Evaluation    Number of Days Evaluated 3 days        Fluid Intake Evaluation    Oral Fluid (mL) 1680        PO Evaluation    Number of Days PO Intake Evaluated 3 days     Number of Meals 7     % PO Intake 93                       Problem/Interventions:   Problem 1       Row Name 07/03/24 1346          Nutrition Diagnoses Problem 1    Problem 1 Nutrition Appropriate for Condition at this Time                          Intervention Goal       Row Name 07/03/24 1346          Intervention Goal    General Disease management/therapy;Meet nutritional needs for age/condition;Reduce/improve symptoms     PO Tolerate PO;Meet estimated needs     Weight Maintain weight                    Nutrition Intervention       Row Name 07/03/24 1346          Nutrition Intervention    RD/Tech Action Follow Tx progress;Care plan reviewd                    Nutrition Prescription       Row Name 07/03/24 1346          Other Orders    Other continue same protocol                    Education/Evaluation       Row Name 07/03/24 1346          Education    Education No discharge needs identified at this time        Monitor/Evaluation    Monitor Per protocol                     Electronically signed by:  Mar Lopez RDN, LEATHA  07/03/24 13:46 CDT

## 2024-07-04 LAB — ERYTHROCYTE [SEDIMENTATION RATE] IN BLOOD: 36 MM/HR (ref 0–20)

## 2024-07-04 PROCEDURE — 97110 THERAPEUTIC EXERCISES: CPT

## 2024-07-04 PROCEDURE — 85652 RBC SED RATE AUTOMATED: CPT | Performed by: INTERNAL MEDICINE

## 2024-07-04 PROCEDURE — 25010000002 ENOXAPARIN PER 10 MG: Performed by: INTERNAL MEDICINE

## 2024-07-04 PROCEDURE — 25010000002 DAPTOMYCIN PER 1 MG: Performed by: INTERNAL MEDICINE

## 2024-07-04 NOTE — PROGRESS NOTES
RYAN Tate APRN        Internal Medicine Progress Note    7/3/2024   19:19 CDT    Name:  Jaqui Kate  MRN:    [id number]     Acct:     [id number]   Room:  [facility]/1   Day: 0     Admit Date: 6/25/2024  6:41 PM  PCP: Vanesa Guevara APRN    Subjective:     C/C: pain    Interval History: Status:  stayed the same.  Resting in bed. Mother at bedside. Afebrile. Maintaining adequate 02 sats on room air. Tolerating treatment thus far. Pain well controlled at present time. Anticipating going outside for therapy later today. Counts stable.     Review of Systems   Constitutional: Positive for malaise/fatigue. Negative for chills, decreased appetite, weight gain and weight loss.   HENT:  Negative for congestion, ear discharge, hoarse voice and tinnitus.    Eyes:  Negative for blurred vision, discharge, visual disturbance and visual halos.   Cardiovascular:  Negative for chest pain, claudication, dyspnea on exertion, irregular heartbeat, leg swelling, orthopnea and paroxysmal nocturnal dyspnea.   Respiratory:  Negative for cough, shortness of breath, sputum production and wheezing.    Endocrine: Negative for cold intolerance, heat intolerance and polyuria.   Hematologic/Lymphatic: Negative for adenopathy. Does not bruise/bleed easily.   Skin:  Negative for dry skin, itching and suspicious lesions.   Musculoskeletal:  Positive for joint pain, muscle weakness and myalgias. Negative for arthritis, back pain and falls.   Gastrointestinal:  Negative for abdominal pain, constipation, diarrhea, dysphagia and hematemesis.   Genitourinary:  Negative for bladder incontinence, dysuria and frequency.   Neurological:  Positive for focal weakness and weakness. Negative for aphonia, disturbances in coordination and dizziness.   Psychiatric/Behavioral:  Positive for substance abuse. Negative for altered mental status and depression. The patient is nervous/anxious. The patient does not have  insomnia.          Medications:     Allergies: No Known Allergies    Current Meds:   Current Facility-Administered Medications:     acetaminophen (TYLENOL) tablet 650 mg, 650 mg, Oral, Q4H PRN **OR** acetaminophen (TYLENOL) suppository 650 mg, 650 mg, Rectal, Q4H PRN, Augustus Fish MD    bisacodyl (DULCOLAX) suppository 10 mg, 10 mg, Rectal, Daily PRN, Augustus Fish MD    cyclobenzaprine (FLEXERIL) tablet 10 mg, 10 mg, Oral, Q8H PRN, Qing Jimenez APRN    DAPTOmycin (CUBICIN) 600 mg in sodium chloride 0.9 % 50 mL IVPB, 8 mg/kg, Intravenous, Q24H, Augustus Fish MD    diphenhydrAMINE (BENADRYL) capsule 25 mg, 25 mg, Oral, Q6H PRN, Augustus Fish MD    Enoxaparin Sodium (LOVENOX) syringe 40 mg, 40 mg, Subcutaneous, Q24H, Augustus Fish MD    gabapentin (NEURONTIN) capsule 300 mg, 300 mg, Oral, TID, Augustus Fish MD    guaiFENesin (MUCINEX) 12 hr tablet 1,200 mg, 1,200 mg, Oral, Q12H PRN, Augustus Fish MD    lactobacillus acidophilus (RISAQUAD) capsule 2 capsule, 2 capsule, Oral, Daily, Augustus Fish MD    Lidocaine 4 % 1 patch, 1 patch, Transdermal, Q24H, Augustus Fish MD    magnesium hydroxide (MILK OF MAGNESIA) 400 MG/5ML suspension 30 mL, 30 mL, Oral, BID PRN, Augustus Fish MD    melatonin tablet 5 mg, 5 mg, Oral, Nightly, Augustus Fish MD    naproxen (NAPROSYN) tablet 250 mg, 250 mg, Oral, Q8H, Qing Jimenez APRN    nicotine (NICODERM CQ) 21 MG/24HR patch 1 patch, 1 patch, Transdermal, Q24H, Augustus Fish MD    OLANZapine (zyPREXA) tablet 5 mg, 5 mg, Oral, Q6H PRN, Augustus Fish MD    ondansetron ODT (ZOFRAN-ODT) disintegrating tablet 4 mg, 4 mg, Oral, Q6H PRN **OR** ondansetron (ZOFRAN) injection 4 mg, 4 mg, Intravenous, Q6H PRN, Augustus Fish MD    oxyCODONE (ROXICODONE) immediate release tablet 20 mg, 20 mg, Oral, Q4H PRN, Augustus Fish MD    pantoprazole (PROTONIX)  "EC tablet 40 mg, 40 mg, Oral, Daily, Augustus Fish MD    promethazine (PHENERGAN) tablet 12.5 mg, 12.5 mg, Oral, Q6H PRN, Augustus Fish MD    rifAMPin (RIFADIN) capsule 300 mg, 300 mg, Oral, Q12H, Augustus Fish MD    sennosides-docusate (PERICOLACE) 8.6-50 MG per tablet 1 tablet, 1 tablet, Oral, BID, Augustus Fish MD    sodium chloride 0.9 % bolus 250 mL, 250 mL, Intravenous, PRN, Augustus Fish MD    sodium chloride 0.9 % flush 10 mL, 10 mL, Intravenous, Q12H, Augustus Fish MD    sodium chloride 0.9 % flush 10 mL, 10 mL, Intravenous, PRN, Augustus Fish MD    tamsulosin (FLOMAX) 24 hr capsule 0.4 mg, 0.4 mg, Oral, Nightly, Augustus Fish MD    traZODone (DESYREL) tablet 100 mg, 100 mg, Oral, Nightly, Augustus Fish MD    Data:     Code Status:    There are no questions and answers to display.       Family History   Problem Relation Age of Onset    Colon cancer Neg Hx     Colon polyps Neg Hx        Social History     Socioeconomic History    Marital status: Single   Tobacco Use    Smoking status: Every Day     Current packs/day: 0.25     Types: Cigarettes    Smokeless tobacco: Never   Vaping Use    Vaping status: Some Days    Substances: Nicotine, Flavoring    Devices: Disposable, Pre-filled pod   Substance and Sexual Activity    Alcohol use: No    Drug use: Yes     Types: Methamphetamines    Sexual activity: Yes       Vitals:  Ht 160 cm (63\")   Wt 72.6 kg (160 lb)   BMI 28.34 kg/m²   T 98.2 P 94 R 18 /74 Sp02 96% (room air)          I/O (24Hr):  No intake or output data in the 24 hours ending 07/03/24 1919    Labs and imaging:      No results found for this or any previous visit (from the past 12 hour(s)).                              Physical Examination:        Physical Exam  Vitals and nursing note reviewed.   Constitutional:       Appearance: Normal appearance.   HENT:      Head: Normocephalic and atraumatic.      Right Ear: " External ear normal.      Left Ear: External ear normal.      Nose: Nose normal.      Mouth/Throat:      Mouth: Mucous membranes are dry.      Pharynx: Oropharynx is clear.   Eyes:      Extraocular Movements: Extraocular movements intact.      Conjunctiva/sclera: Conjunctivae normal.      Pupils: Pupils are equal, round, and reactive to light.   Cardiovascular:      Rate and Rhythm: Normal rate and regular rhythm.      Pulses: Normal pulses.      Heart sounds: Normal heart sounds.   Pulmonary:      Effort: Pulmonary effort is normal.      Breath sounds: Normal breath sounds.   Abdominal:      General: Bowel sounds are normal.      Palpations: Abdomen is soft.   Musculoskeletal:      Cervical back: Normal range of motion and neck supple.      Comments: Generalized weakness  BLE weakness   Skin:     General: Skin is warm and dry.   Neurological:      Mental Status: She is alert and oriented to person, place, and time.   Psychiatric:         Mood and Affect: Mood normal.         Behavior: Behavior normal.           Assessment:          * No active hospital problems. *    Past Medical History:   Diagnosis Date    Anxiety     Depressed     GERD (gastroesophageal reflux disease)     Hepatitis C         Plan:        T8-9 MRSA discitis/osteomyelitis  Polysubstance abuse  History of triscupid valve endocarditis  Hepatitis C  Multifactorial anemia  GERD  Anxiety  Depression    Continue current treatment. Monitor counts. Increase activity. Labs Monday. Continue pain control efforts - will continue to wean pain medication. Wound care per wound care team. Maintain patient safety. Aggressive therapies as tolerated. Continue IV antibiotics under direction of ID.       Electronically signed by KELLY Jones on 7/3/2024 at 19:19 CDT     I have discussed the care of Jaqui Kate, including pertinent history and exam findings, with the nurse practitioner.    I have seen and examined the patient and the key elements of all  parts of the encounter have been performed by me.  I agree with the assessment, plan and orders as documented by KELLY Philip, after I modified the exam findings and the plan of treatments and the final version is my approved version of the assessment.        Electronically signed by Augustus Fish MD on 7/4/2024 at 08:27 CDT

## 2024-07-04 NOTE — PROGRESS NOTES
RYAN Tate APRN        Internal Medicine Progress Note    7/4/2024   09:22 CDT    Name:  Jaqui Kate  MRN:    [id number]     Acct:     [id number]   Room:  [facility] Day: 0     Admit Date: 6/25/2024  6:41 PM  PCP: Vanesa Guevara APRN    Subjective:     C/C: pain    Interval History: Status:  stayed the same.  Resting in bed. Sleeping. Mother asleep at bedside. Afebrile. Maintaining adequate 02 sats on room air. Tolerating treatment thus far. Appears in no acute distress.     Review of Systems   Constitutional: Positive for malaise/fatigue. Negative for chills, decreased appetite, weight gain and weight loss.   HENT:  Negative for congestion, ear discharge, hoarse voice and tinnitus.    Eyes:  Negative for blurred vision, discharge, visual disturbance and visual halos.   Cardiovascular:  Negative for chest pain, claudication, dyspnea on exertion, irregular heartbeat, leg swelling, orthopnea and paroxysmal nocturnal dyspnea.   Respiratory:  Negative for cough, shortness of breath, sputum production and wheezing.    Endocrine: Negative for cold intolerance, heat intolerance and polyuria.   Hematologic/Lymphatic: Negative for adenopathy. Does not bruise/bleed easily.   Skin:  Negative for dry skin, itching and suspicious lesions.   Musculoskeletal:  Positive for joint pain, muscle weakness and myalgias. Negative for arthritis, back pain and falls.   Gastrointestinal:  Negative for abdominal pain, constipation, diarrhea, dysphagia and hematemesis.   Genitourinary:  Negative for bladder incontinence, dysuria and frequency.   Neurological:  Positive for focal weakness and weakness. Negative for aphonia, disturbances in coordination and dizziness.   Psychiatric/Behavioral:  Positive for substance abuse. Negative for altered mental status and depression. The patient is nervous/anxious. The patient does not have insomnia.          Medications:     Allergies: No Known  Allergies    Current Meds:   Current Facility-Administered Medications:     acetaminophen (TYLENOL) tablet 650 mg, 650 mg, Oral, Q4H PRN **OR** acetaminophen (TYLENOL) suppository 650 mg, 650 mg, Rectal, Q4H PRN, Augustus Fish MD    bisacodyl (DULCOLAX) suppository 10 mg, 10 mg, Rectal, Daily PRN, Augustus Fish MD    cyclobenzaprine (FLEXERIL) tablet 10 mg, 10 mg, Oral, Q8H PRN, Qing Jimenez APRN    DAPTOmycin (CUBICIN) 600 mg in sodium chloride 0.9 % 50 mL IVPB, 8 mg/kg, Intravenous, Q24H, Augustus Fish MD    diphenhydrAMINE (BENADRYL) capsule 25 mg, 25 mg, Oral, Q6H PRN, Augustus Fish MD    Enoxaparin Sodium (LOVENOX) syringe 40 mg, 40 mg, Subcutaneous, Q24H, Augustus Fish MD    gabapentin (NEURONTIN) capsule 300 mg, 300 mg, Oral, TID, Augustus Fish MD    guaiFENesin (MUCINEX) 12 hr tablet 1,200 mg, 1,200 mg, Oral, Q12H PRN, Augustus Fish MD    lactobacillus acidophilus (RISAQUAD) capsule 2 capsule, 2 capsule, Oral, Daily, Augustus Fish MD    Lidocaine 4 % 1 patch, 1 patch, Transdermal, Q24H, Augustus Fish MD    magnesium hydroxide (MILK OF MAGNESIA) 400 MG/5ML suspension 30 mL, 30 mL, Oral, BID PRN, Augustus Fish MD    melatonin tablet 5 mg, 5 mg, Oral, Nightly, Augustus Fish MD    naproxen (NAPROSYN) tablet 250 mg, 250 mg, Oral, Q8H, Qing Jimenez APRN    nicotine (NICODERM CQ) 21 MG/24HR patch 1 patch, 1 patch, Transdermal, Q24H, Augustus Fish MD    OLANZapine (zyPREXA) tablet 5 mg, 5 mg, Oral, Q6H PRN, Augustus Fish MD    ondansetron ODT (ZOFRAN-ODT) disintegrating tablet 4 mg, 4 mg, Oral, Q6H PRN **OR** ondansetron (ZOFRAN) injection 4 mg, 4 mg, Intravenous, Q6H PRN, Augustus Fish MD    oxyCODONE (ROXICODONE) immediate release tablet 20 mg, 20 mg, Oral, Q4H PRN, Augustus Fish MD    pantoprazole (PROTONIX) EC tablet 40 mg, 40 mg, Oral, Daily, Augustus Fish  "MD Juvenal    promethazine (PHENERGAN) tablet 12.5 mg, 12.5 mg, Oral, Q6H PRN, Augustus Fish MD    rifAMPin (RIFADIN) capsule 300 mg, 300 mg, Oral, Q12H, Augustus Fish MD    sennosides-docusate (PERICOLACE) 8.6-50 MG per tablet 1 tablet, 1 tablet, Oral, BID, Augustus Fish MD    sodium chloride 0.9 % bolus 250 mL, 250 mL, Intravenous, PRN, Augustus Fish MD    sodium chloride 0.9 % flush 10 mL, 10 mL, Intravenous, Q12H, Augustus Fish MD    sodium chloride 0.9 % flush 10 mL, 10 mL, Intravenous, PRN, Augustus Fish MD    tamsulosin (FLOMAX) 24 hr capsule 0.4 mg, 0.4 mg, Oral, Nightly, Augustus Fish MD    traZODone (DESYREL) tablet 100 mg, 100 mg, Oral, Nightly, Augustus Fish MD    Data:     Code Status:    There are no questions and answers to display.       Family History   Problem Relation Age of Onset    Colon cancer Neg Hx     Colon polyps Neg Hx        Social History     Socioeconomic History    Marital status: Single   Tobacco Use    Smoking status: Every Day     Current packs/day: 0.25     Types: Cigarettes    Smokeless tobacco: Never   Vaping Use    Vaping status: Some Days    Substances: Nicotine, Flavoring    Devices: Disposable, Pre-filled pod   Substance and Sexual Activity    Alcohol use: No    Drug use: Yes     Types: Methamphetamines    Sexual activity: Yes       Vitals:  Ht 160 cm (63\")   Wt 72.6 kg (160 lb)   BMI 28.34 kg/m²   T 98.1 P 97 R 16 /81 Sp02 96% (room air)          I/O (24Hr):  No intake or output data in the 24 hours ending 07/04/24 0922    Labs and imaging:      Recent Results (from the past 12 hour(s))   Sedimentation Rate    Collection Time: 07/04/24  8:25 AM    Specimen: Blood   Result Value Ref Range    Sed Rate 36 (H) 0 - 20 mm/hr                                 Physical Examination:        Physical Exam  Vitals and nursing note reviewed.   Constitutional:       Appearance: Normal appearance.   HENT:      " Head: Normocephalic and atraumatic.      Right Ear: External ear normal.      Left Ear: External ear normal.      Nose: Nose normal.      Mouth/Throat:      Mouth: Mucous membranes are dry.      Pharynx: Oropharynx is clear.   Eyes:      Extraocular Movements: Extraocular movements intact.      Conjunctiva/sclera: Conjunctivae normal.      Pupils: Pupils are equal, round, and reactive to light.   Cardiovascular:      Rate and Rhythm: Normal rate and regular rhythm.      Pulses: Normal pulses.      Heart sounds: Normal heart sounds.   Pulmonary:      Effort: Pulmonary effort is normal.      Breath sounds: Normal breath sounds.   Abdominal:      General: Bowel sounds are normal.      Palpations: Abdomen is soft.   Musculoskeletal:      Cervical back: Normal range of motion and neck supple.      Comments: Generalized weakness  BLE weakness   Skin:     General: Skin is warm and dry.   Neurological:      Mental Status: She is alert and oriented to person, place, and time.   Psychiatric:         Mood and Affect: Mood normal.         Behavior: Behavior normal.           Assessment:          * No active hospital problems. *    Past Medical History:   Diagnosis Date    Anxiety     Depressed     GERD (gastroesophageal reflux disease)     Hepatitis C         Plan:        T8-9 MRSA discitis/osteomyelitis  Polysubstance abuse  History of triscupid valve endocarditis  Hepatitis C  Multifactorial anemia  GERD  Anxiety  Depression    Continue current treatment. Monitor counts. Increase activity. Labs Monday. Continue pain control efforts - will continue to wean pain medication. Wound care per wound care team. Maintain patient safety. Aggressive therapies as tolerated. Continue IV antibiotics under direction of ID.       Electronically signed by KELLY Jones on 7/4/2024 at 09:22 CDT

## 2024-07-05 PROCEDURE — 99232 SBSQ HOSP IP/OBS MODERATE 35: CPT | Performed by: INTERNAL MEDICINE

## 2024-07-05 PROCEDURE — 97535 SELF CARE MNGMENT TRAINING: CPT

## 2024-07-05 PROCEDURE — 97530 THERAPEUTIC ACTIVITIES: CPT

## 2024-07-05 PROCEDURE — 25010000002 DAPTOMYCIN PER 1 MG: Performed by: INTERNAL MEDICINE

## 2024-07-05 PROCEDURE — 97110 THERAPEUTIC EXERCISES: CPT

## 2024-07-05 PROCEDURE — 25010000002 ENOXAPARIN PER 10 MG: Performed by: INTERNAL MEDICINE

## 2024-07-05 NOTE — PROGRESS NOTES
RYAN Tate APRN        Internal Medicine Progress Note    7/5/2024   10:23 CDT    Name:  Jaqui Kate  MRN:    [id number]     Acct:     [id number]   Room:  [facility] Day: 0     Admit Date: 6/25/2024  6:41 PM  PCP: Vanesa Guevara APRN    Subjective:     C/C: pain    Interval History: Status:  stayed the same.  Resting in bed.  Mother asleep at bedside. Afebrile. Maintaining adequate 02 sats on room air. Tolerating treatment thus far. Appears in no acute distress. Progressing with therapies. Has independent movement of left lower extremity. Motivated to work with therapies. Tolerating treatment thus far.     Review of Systems   Constitutional: Positive for malaise/fatigue. Negative for chills, decreased appetite, weight gain and weight loss.   HENT:  Negative for congestion, ear discharge, hoarse voice and tinnitus.    Eyes:  Negative for blurred vision, discharge, visual disturbance and visual halos.   Cardiovascular:  Negative for chest pain, claudication, dyspnea on exertion, irregular heartbeat, leg swelling, orthopnea and paroxysmal nocturnal dyspnea.   Respiratory:  Negative for cough, shortness of breath, sputum production and wheezing.    Endocrine: Negative for cold intolerance, heat intolerance and polyuria.   Hematologic/Lymphatic: Negative for adenopathy. Does not bruise/bleed easily.   Skin:  Negative for dry skin, itching and suspicious lesions.   Musculoskeletal:  Positive for joint pain, muscle weakness and myalgias. Negative for arthritis, back pain and falls.   Gastrointestinal:  Negative for abdominal pain, constipation, diarrhea, dysphagia and hematemesis.   Genitourinary:  Negative for bladder incontinence, dysuria and frequency.   Neurological:  Positive for focal weakness and weakness. Negative for aphonia, disturbances in coordination and dizziness.   Psychiatric/Behavioral:  Positive for substance abuse. Negative for altered mental status and  depression. The patient is nervous/anxious. The patient does not have insomnia.          Medications:     Allergies: No Known Allergies    Current Meds:   Current Facility-Administered Medications:     acetaminophen (TYLENOL) tablet 650 mg, 650 mg, Oral, Q4H PRN **OR** acetaminophen (TYLENOL) suppository 650 mg, 650 mg, Rectal, Q4H PRN, Augustus Fish MD    bisacodyl (DULCOLAX) suppository 10 mg, 10 mg, Rectal, Daily PRN, Augustus Fish MD    cyclobenzaprine (FLEXERIL) tablet 10 mg, 10 mg, Oral, Q8H PRN, Qing Jimenez APRN    DAPTOmycin (CUBICIN) 600 mg in sodium chloride 0.9 % 50 mL IVPB, 8 mg/kg, Intravenous, Q24H, Augustus Fish MD    diphenhydrAMINE (BENADRYL) capsule 25 mg, 25 mg, Oral, Q6H PRN, Augustus Fish MD    Enoxaparin Sodium (LOVENOX) syringe 40 mg, 40 mg, Subcutaneous, Q24H, Augustus Fish MD    gabapentin (NEURONTIN) capsule 300 mg, 300 mg, Oral, TID, Augustus Fish MD    guaiFENesin (MUCINEX) 12 hr tablet 1,200 mg, 1,200 mg, Oral, Q12H PRN, Augustus Fish MD    lactobacillus acidophilus (RISAQUAD) capsule 2 capsule, 2 capsule, Oral, Daily, Augustus Fish MD    Lidocaine 4 % 1 patch, 1 patch, Transdermal, Q24H, Augustus Fish MD    magnesium hydroxide (MILK OF MAGNESIA) 400 MG/5ML suspension 30 mL, 30 mL, Oral, BID PRN, Augustus Fish MD    melatonin tablet 5 mg, 5 mg, Oral, Nightly, Augustus Fish MD    naproxen (NAPROSYN) tablet 250 mg, 250 mg, Oral, Q8H, Qing Jimenez APRN    nicotine (NICODERM CQ) 21 MG/24HR patch 1 patch, 1 patch, Transdermal, Q24H, Augustus Fish MD    OLANZapine (zyPREXA) tablet 5 mg, 5 mg, Oral, Q6H PRN, Augustus Fish MD    ondansetron ODT (ZOFRAN-ODT) disintegrating tablet 4 mg, 4 mg, Oral, Q6H PRN **OR** ondansetron (ZOFRAN) injection 4 mg, 4 mg, Intravenous, Q6H PRN, Augustus Fish MD    oxyCODONE (ROXICODONE) immediate release tablet 20 mg, 20  "mg, Oral, Q4H PRN, Augustus Fish MD    pantoprazole (PROTONIX) EC tablet 40 mg, 40 mg, Oral, Daily, Augustus Fish MD    promethazine (PHENERGAN) tablet 12.5 mg, 12.5 mg, Oral, Q6H PRN, Augustus Fish MD    rifAMPin (RIFADIN) capsule 300 mg, 300 mg, Oral, Q12H, Augustus Fish MD    sennosides-docusate (PERICOLACE) 8.6-50 MG per tablet 1 tablet, 1 tablet, Oral, BID, Augustus Fish MD    sodium chloride 0.9 % bolus 250 mL, 250 mL, Intravenous, PRN, Augustus Fish MD    sodium chloride 0.9 % flush 10 mL, 10 mL, Intravenous, Q12H, Augustus Fish MD    sodium chloride 0.9 % flush 10 mL, 10 mL, Intravenous, PRN, Augustus Fish MD    tamsulosin (FLOMAX) 24 hr capsule 0.4 mg, 0.4 mg, Oral, Nightly, Augustus Fish MD    traZODone (DESYREL) tablet 100 mg, 100 mg, Oral, Nightly, Augustus Fish MD    Data:     Code Status:    There are no questions and answers to display.       Family History   Problem Relation Age of Onset    Colon cancer Neg Hx     Colon polyps Neg Hx        Social History     Socioeconomic History    Marital status: Single   Tobacco Use    Smoking status: Every Day     Current packs/day: 0.25     Types: Cigarettes    Smokeless tobacco: Never   Vaping Use    Vaping status: Some Days    Substances: Nicotine, Flavoring    Devices: Disposable, Pre-filled pod   Substance and Sexual Activity    Alcohol use: No    Drug use: Yes     Types: Methamphetamines    Sexual activity: Yes       Vitals:  Ht 160 cm (63\")   Wt 72.6 kg (160 lb)   BMI 28.34 kg/m²   T 98.5 P 98 R 16 /74 Sp02 97% (room air)          I/O (24Hr):  No intake or output data in the 24 hours ending 07/05/24 1023    Labs and imaging:      No results found for this or any previous visit (from the past 12 hour(s)).                                Physical Examination:        Physical Exam  Vitals and nursing note reviewed.   Constitutional:       Appearance: Normal " appearance.   HENT:      Head: Normocephalic and atraumatic.      Right Ear: External ear normal.      Left Ear: External ear normal.      Nose: Nose normal.      Mouth/Throat:      Mouth: Mucous membranes are dry.      Pharynx: Oropharynx is clear.   Eyes:      Extraocular Movements: Extraocular movements intact.      Conjunctiva/sclera: Conjunctivae normal.      Pupils: Pupils are equal, round, and reactive to light.   Cardiovascular:      Rate and Rhythm: Normal rate and regular rhythm.      Pulses: Normal pulses.      Heart sounds: Normal heart sounds.   Pulmonary:      Effort: Pulmonary effort is normal.      Breath sounds: Normal breath sounds.   Abdominal:      General: Bowel sounds are normal.      Palpations: Abdomen is soft.   Musculoskeletal:      Cervical back: Normal range of motion and neck supple.      Comments: Generalized weakness  BLE weakness   Skin:     General: Skin is warm and dry.   Neurological:      Mental Status: She is alert and oriented to person, place, and time.   Psychiatric:         Mood and Affect: Mood normal.         Behavior: Behavior normal.           Assessment:          * No active hospital problems. *    Past Medical History:   Diagnosis Date    Anxiety     Depressed     GERD (gastroesophageal reflux disease)     Hepatitis C         Plan:        T8-9 MRSA discitis/osteomyelitis  Polysubstance abuse  History of triscupid valve endocarditis  Hepatitis C  Multifactorial anemia  GERD  Anxiety  Depression    Continue current treatment. Monitor counts. Increase activity. Labs Monday. Continue pain control efforts - will continue to wean pain medication. Wound care per wound care team. Maintain patient safety. Aggressive therapies as tolerated. Continue IV antibiotics under direction of ID.       Electronically signed by KELLY Jones on 7/5/2024 at 10:23 CDT

## 2024-07-05 NOTE — PROGRESS NOTES
"INFECTIOUS DISEASES PROGRESS NOTE    Patient:  Jaqui Kate  YOB: 1990  MRN: [id number]   Admit date: 6/25/2024   Admitting Physician: Augustus Fish MD  Primary Care Physician: Vanesa Guevara APRN    Chief Complaint: No complaints      Interval History: Patient offers no complaints today.  She reports she is tolerating medication without difficulty.      Allergies: No Known Allergies    Current Scheduled Medications:   DAPTOmycin, 8 mg/kg, Intravenous, Q24H  enoxaparin, 40 mg, Subcutaneous, Q24H  gabapentin, 300 mg, Oral, TID  lactobacillus acidophilus, 2 capsule, Oral, Daily  Lidocaine, 1 patch, Transdermal, Q24H  melatonin, 5 mg, Oral, Nightly  naproxen, 250 mg, Oral, Q8H  nicotine, 1 patch, Transdermal, Q24H  pantoprazole, 40 mg, Oral, Daily  rifAMPin, 300 mg, Oral, Q12H  senna-docusate sodium, 1 tablet, Oral, BID  sodium chloride, 10 mL, Intravenous, Q12H  tamsulosin, 0.4 mg, Oral, Nightly  traZODone, 100 mg, Oral, Nightly      Current PRN Medications:    acetaminophen **OR** acetaminophen    bisacodyl    cyclobenzaprine    diphenhydrAMINE    guaiFENesin    magnesium hydroxide    OLANZapine    ondansetron ODT **OR** ondansetron    oxyCODONE    promethazine    sodium chloride    sodium chloride            Objective     Vital Signs:  No data recorded.      Ht 160 cm (63\")   Wt 72.6 kg (160 lb)   BMI 28.34 kg/m²     Temperature 98.5    heart rate 98     respiratory rate 16   blood pressure 112/74    Physical Exam:  General: The patient is nontoxic-appearing lying on her right side looking at her phone.  Her mother is at bedside  Respiratory: Effort even and unlabored, she not conversationally dyspneic  Right upper extremity PICC line dressing clean dry and intact        Results Review:    I reviewed the patient's new clinical results.    Lab Results:    CBC:   Lab Results   Lab 07/01/24  0608 07/03/24  0401   WBC 4.46 5.04   HEMOGLOBIN 10.0* 9.9*   HEMATOCRIT 32.9* 32.6*   PLATELETS " 467* 420        AutoDiff:   Lab Results   Lab 07/01/24  0608 07/03/24  0401   NEUTROPHIL % 44.1 49.4   LYMPHOCYTE % 38.8 33.9   MONOCYTES % 11.4 10.5   EOSINOPHIL % 4.9 5.4   BASOPHIL % 0.4 0.4   NEUTROS ABS 1.96 2.49   LYMPHS ABS 1.73 1.71   MONOS ABS 0.51 0.53   EOS ABS 0.22 0.27   BASOS ABS 0.02 0.02        Manual Diff:    Lab Results   Lab 07/01/24  0608 07/03/24  0401   NEUTROS ABS 1.96 2.49           CMP:   Lab Results   Lab 07/01/24  0608 07/03/24  0401   SODIUM 137 139   POTASSIUM 4.1 4.1   CHLORIDE 103 103   CO2 26.0 29.0   BUN 20 19   CREATININE 0.50* 0.65   CALCIUM 9.1 9.0   GLUCOSE 83 98       Estimated Creatinine Clearance: 116.5 mL/min (by C-G formula based on SCr of 0.65 mg/dL).      C-Reactive Protein   Date Value Ref Range Status   07/03/2024 <0.30 0.00 - 0.50 mg/dL Final   07/01/2024 <0.30 0.00 - 0.50 mg/dL Final     Sed Rate   Date Value Ref Range Status   07/04/2024 36 (H) 0 - 20 mm/hr Final   07/01/2024 50 (H) 0 - 20 mm/hr Final       Culture Results:    Microbiology Results (last 10 days)       ** No results found for the last 240 hours. **               CPK          7/1/2024    06:08   Common Labs   Creatine Kinase 19       Radiology:   Imaging Results (Last 72 Hours)       ** No results found for the last 72 hours. **                There are no active hospital problems to display for this patient.      IMPRESSION:  MRSA discitis/osteomyelitis/epidural abscess thoracic spine status postsurgical drainage Luna [facility] at [facility] in [place].  Patient working with physical therapy but is nonambulatory and that was prior to this admission  Recurrent MRSA bacteremia-patient had bacteremia in January 2024, again in March 2024 and then again when presented back to hospital in June.  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        RECOMMENDATION:   Continue  daptomycin-current plan is through July 20   Continue oral rifampin-current plan is through July 20   Will need periodic creatinine kinase monitoring given daptomycin use.  CK has been ordered for Monday  Plan after completion of intravenous daptomycin will be for lifelong suppression with oral antibiotics.  Cultures from outside facility reveal oral options to include: Tetracycline/doxycycline/minocycline, clindamycin and Bactrim        Debby Gastelum MD  07/05/24  10:48 CDT

## 2024-07-06 PROCEDURE — 25010000002 ENOXAPARIN PER 10 MG: Performed by: INTERNAL MEDICINE

## 2024-07-06 PROCEDURE — 97530 THERAPEUTIC ACTIVITIES: CPT

## 2024-07-06 PROCEDURE — 97110 THERAPEUTIC EXERCISES: CPT

## 2024-07-06 PROCEDURE — 25010000002 DAPTOMYCIN PER 1 MG: Performed by: INTERNAL MEDICINE

## 2024-07-06 NOTE — PROGRESS NOTES
RYAN Tate APRN        Internal Medicine Progress Note    7/6/2024   16:15 CDT    Name:  Jaqui Kate  MRN:    [id number]     Acct:     [id number]   Room:  [facility] Day: 0     Admit Date: 6/25/2024  6:41 PM  PCP: Vanesa Guevara APRN    Subjective:     C/C: pain    Interval History: Status:  stayed the same.  Up to bedside chair. 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 Seems is better spirits. Working with therapy. Afebrile. No complaints at present.     Review of Systems   Constitutional: Positive for malaise/fatigue. Negative for chills, decreased appetite, weight gain and weight loss.   HENT:  Negative for congestion, ear discharge, hoarse voice and tinnitus.    Eyes:  Negative for blurred vision, discharge, visual disturbance and visual halos.   Cardiovascular:  Negative for chest pain, claudication, dyspnea on exertion, irregular heartbeat, leg swelling, orthopnea and paroxysmal nocturnal dyspnea.   Respiratory:  Negative for cough, shortness of breath, sputum production and wheezing.    Endocrine: Negative for cold intolerance, heat intolerance and polyuria.   Hematologic/Lymphatic: Negative for adenopathy. Does not bruise/bleed easily.   Skin:  Negative for dry skin, itching and suspicious lesions.   Musculoskeletal:  Positive for joint pain, muscle weakness and myalgias. Negative for arthritis, back pain and falls.   Gastrointestinal:  Negative for abdominal pain, constipation, diarrhea, dysphagia and hematemesis.   Genitourinary:  Negative for bladder incontinence, dysuria and frequency.   Neurological:  Positive for focal weakness and weakness. Negative for aphonia, disturbances in coordination and dizziness.   Psychiatric/Behavioral:  Positive for substance abuse. Negative for altered mental status and depression. The patient is nervous/anxious. The patient does not have insomnia.          Medications:     Allergies: No Known Allergies    Current Meds:   Current  Facility-Administered Medications:     acetaminophen (TYLENOL) tablet 650 mg, 650 mg, Oral, Q4H PRN **OR** acetaminophen (TYLENOL) suppository 650 mg, 650 mg, Rectal, Q4H PRN, Augustus Fish MD    bisacodyl (DULCOLAX) suppository 10 mg, 10 mg, Rectal, Daily PRN, Augustus Fish MD    cyclobenzaprine (FLEXERIL) tablet 10 mg, 10 mg, Oral, Q8H PRN, Qing Jimenez, KELLY    DAPTOmycin (CUBICIN) 600 mg in sodium chloride 0.9 % 50 mL IVPB, 8 mg/kg, Intravenous, Q24H, Augustus Fish MD    diphenhydrAMINE (BENADRYL) capsule 25 mg, 25 mg, Oral, Q6H PRN, Augustus Fish MD    Enoxaparin Sodium (LOVENOX) syringe 40 mg, 40 mg, Subcutaneous, Q24H, Augustus Fish MD    gabapentin (NEURONTIN) capsule 300 mg, 300 mg, Oral, TID, Augustus Fish MD    guaiFENesin (MUCINEX) 12 hr tablet 1,200 mg, 1,200 mg, Oral, Q12H PRN, Augustus Fish MD    lactobacillus acidophilus (RISAQUAD) capsule 2 capsule, 2 capsule, Oral, Daily, Augustus Fish MD    Lidocaine 4 % 1 patch, 1 patch, Transdermal, Q24H, Augustus Fish MD    magnesium hydroxide (MILK OF MAGNESIA) 400 MG/5ML suspension 30 mL, 30 mL, Oral, BID PRN, Augustus Fish MD    melatonin tablet 5 mg, 5 mg, Oral, Nightly, Augustus Fish MD    naproxen (NAPROSYN) tablet 250 mg, 250 mg, Oral, Q8H, iQng Jimenez APRN    nicotine (NICODERM CQ) 21 MG/24HR patch 1 patch, 1 patch, Transdermal, Q24H, Augustus Fish MD    OLANZapine (zyPREXA) tablet 5 mg, 5 mg, Oral, Q6H PRN, Augustus Fish MD    ondansetron ODT (ZOFRAN-ODT) disintegrating tablet 4 mg, 4 mg, Oral, Q6H PRN **OR** ondansetron (ZOFRAN) injection 4 mg, 4 mg, Intravenous, Q6H PRN, Augustus Fish MD    oxyCODONE (ROXICODONE) immediate release tablet 20 mg, 20 mg, Oral, Q4H PRN, Augustus Fish MD    pantoprazole (PROTONIX) EC tablet 40 mg, 40 mg, Oral, Daily, Augustus Fish MD    promethazine (PHENERGAN)  "tablet 12.5 mg, 12.5 mg, Oral, Q6H PRN, Augustus Fish MD    rifAMPin (RIFADIN) capsule 300 mg, 300 mg, Oral, Q12H, Augustus Fish MD    sennosides-docusate (PERICOLACE) 8.6-50 MG per tablet 1 tablet, 1 tablet, Oral, BID, Augustus Fish MD    sodium chloride 0.9 % bolus 250 mL, 250 mL, Intravenous, PRN, Augustus Fish MD    sodium chloride 0.9 % flush 10 mL, 10 mL, Intravenous, Q12H, Augustus Fish MD    sodium chloride 0.9 % flush 10 mL, 10 mL, Intravenous, PRN, Augustsu Fish MD    tamsulosin (FLOMAX) 24 hr capsule 0.4 mg, 0.4 mg, Oral, Nightly, Augustus Fish MD    traZODone (DESYREL) tablet 100 mg, 100 mg, Oral, Nightly, Augustus Fish MD    Data:     Code Status:    There are no questions and answers to display.       Family History   Problem Relation Age of Onset    Colon cancer Neg Hx     Colon polyps Neg Hx        Social History     Socioeconomic History    Marital status: Single   Tobacco Use    Smoking status: Every Day     Current packs/day: 0.25     Types: Cigarettes    Smokeless tobacco: Never   Vaping Use    Vaping status: Some Days    Substances: Nicotine, Flavoring    Devices: Disposable, Pre-filled pod   Substance and Sexual Activity    Alcohol use: No    Drug use: Yes     Types: Methamphetamines    Sexual activity: Yes       Vitals:  Ht 160 cm (63\")   Wt 72.6 kg (160 lb)   BMI 28.34 kg/m²   T 98.4 P 90 R 18 /76 Sp02 98% (room air)          I/O (24Hr):  No intake or output data in the 24 hours ending 07/06/24 1615    Labs and imaging:      No results found for this or any previous visit (from the past 12 hour(s)).                                Physical Examination:        Physical Exam  Vitals and nursing note reviewed.   Constitutional:       Appearance: Normal appearance.   HENT:      Head: Normocephalic and atraumatic.      Right Ear: External ear normal.      Left Ear: External ear normal.      Nose: Nose normal.      " Mouth/Throat:      Mouth: Mucous membranes are dry.      Pharynx: Oropharynx is clear.   Eyes:      Extraocular Movements: Extraocular movements intact.      Conjunctiva/sclera: Conjunctivae normal.      Pupils: Pupils are equal, round, and reactive to light.   Cardiovascular:      Rate and Rhythm: Normal rate and regular rhythm.      Pulses: Normal pulses.      Heart sounds: Normal heart sounds.   Pulmonary:      Effort: Pulmonary effort is normal.      Breath sounds: Normal breath sounds.   Abdominal:      General: Bowel sounds are normal.      Palpations: Abdomen is soft.   Musculoskeletal:      Cervical back: Normal range of motion and neck supple.      Comments: Generalized weakness  BLE weakness   Skin:     General: Skin is warm and dry.   Neurological:      Mental Status: She is alert and oriented to person, place, and time.   Psychiatric:         Mood and Affect: Mood normal.         Behavior: Behavior normal.           Assessment:          * No active hospital problems. *    Past Medical History:   Diagnosis Date    Anxiety     Depressed     GERD (gastroesophageal reflux disease)     Hepatitis C         Plan:        T8-9 MRSA discitis/osteomyelitis  Polysubstance abuse  History of triscupid valve endocarditis  Hepatitis C  Multifactorial anemia  GERD  Anxiety  Depression    Continue current treatment. Monitor counts. Increase activity. Labs Monday. Continue pain control efforts - will continue to wean pain medication. Wound care per wound care team. Maintain patient safety. Aggressive therapies as tolerated. Continue IV antibiotics under direction of ID.       Electronically signed by KELLY Urena on 7/6/2024 at 16:15 CDT     I have discussed the care of Jaqui Kate, including pertinent history and exam findings, with the nurse practitioner.    I have seen and examined the patient and the key elements of all parts of the encounter have been performed by me.  I agree with the assessment, plan and  orders as documented by KELLY Caba, after I modified the exam findings and the plan of treatments and the final version is my approved version of the assessment.        Electronically signed by Augustus Fish MD on 7/6/2024 at 18:52 CDT

## 2024-07-07 PROCEDURE — 97110 THERAPEUTIC EXERCISES: CPT

## 2024-07-07 PROCEDURE — 97530 THERAPEUTIC ACTIVITIES: CPT

## 2024-07-07 PROCEDURE — 25010000002 ENOXAPARIN PER 10 MG: Performed by: INTERNAL MEDICINE

## 2024-07-07 PROCEDURE — 25010000002 DAPTOMYCIN PER 1 MG: Performed by: INTERNAL MEDICINE

## 2024-07-07 RX ORDER — OXYCODONE HYDROCHLORIDE 5 MG/1
20 TABLET ORAL EVERY 4 HOURS PRN
Status: DISCONTINUED | OUTPATIENT
Start: 2024-07-07 | End: 2024-07-11

## 2024-07-07 NOTE — PROGRESS NOTES
RYAN Tate APRN        Internal Medicine Progress Note    7/7/2024   09:20 CDT    Name:  Jaqui Kate  MRN:    [id number]     Acct:     [id number]   Room:  [facility] Day: 0     Admit Date: 6/25/2024  6:41 PM  PCP: Vanesa Guevara APRN    Subjective:     C/C: pain    Interval History: Status:  stayed the same. Sleeping in bed. Family at bedside. Afebrile. Staff report no new issues.     Review of Systems   Constitutional: Positive for malaise/fatigue. Negative for chills, decreased appetite, weight gain and weight loss.   HENT:  Negative for congestion, ear discharge, hoarse voice and tinnitus.    Eyes:  Negative for blurred vision, discharge, visual disturbance and visual halos.   Cardiovascular:  Negative for chest pain, claudication, dyspnea on exertion, irregular heartbeat, leg swelling, orthopnea and paroxysmal nocturnal dyspnea.   Respiratory:  Negative for cough, shortness of breath, sputum production and wheezing.    Endocrine: Negative for cold intolerance, heat intolerance and polyuria.   Hematologic/Lymphatic: Negative for adenopathy. Does not bruise/bleed easily.   Skin:  Negative for dry skin, itching and suspicious lesions.   Musculoskeletal:  Positive for joint pain, muscle weakness and myalgias. Negative for arthritis, back pain and falls.   Gastrointestinal:  Negative for abdominal pain, constipation, diarrhea, dysphagia and hematemesis.   Genitourinary:  Negative for bladder incontinence, dysuria and frequency.   Neurological:  Positive for focal weakness and weakness. Negative for aphonia, disturbances in coordination and dizziness.   Psychiatric/Behavioral:  Positive for substance abuse. Negative for altered mental status and depression. The patient is nervous/anxious. The patient does not have insomnia.          Medications:     Allergies: No Known Allergies    Current Meds:   Current Facility-Administered Medications:     acetaminophen (TYLENOL)  tablet 650 mg, 650 mg, Oral, Q4H PRN **OR** acetaminophen (TYLENOL) suppository 650 mg, 650 mg, Rectal, Q4H PRN, Augustus Fish MD    bisacodyl (DULCOLAX) suppository 10 mg, 10 mg, Rectal, Daily PRN, Augustus Fish MD    cyclobenzaprine (FLEXERIL) tablet 10 mg, 10 mg, Oral, Q8H PRN, Qing Jimenez, APRN    DAPTOmycin (CUBICIN) 600 mg in sodium chloride 0.9 % 50 mL IVPB, 8 mg/kg, Intravenous, Q24H, Augustus Fish MD    diphenhydrAMINE (BENADRYL) capsule 25 mg, 25 mg, Oral, Q6H PRN, Augustus Fish MD    Enoxaparin Sodium (LOVENOX) syringe 40 mg, 40 mg, Subcutaneous, Q24H, Augustus Fish MD    gabapentin (NEURONTIN) capsule 300 mg, 300 mg, Oral, TID, Augustus Fish MD    guaiFENesin (MUCINEX) 12 hr tablet 1,200 mg, 1,200 mg, Oral, Q12H PRN, Augustus Fish MD    lactobacillus acidophilus (RISAQUAD) capsule 2 capsule, 2 capsule, Oral, Daily, Augustus Fish MD    Lidocaine 4 % 1 patch, 1 patch, Transdermal, Q24H, Augustus Fish MD    magnesium hydroxide (MILK OF MAGNESIA) 400 MG/5ML suspension 30 mL, 30 mL, Oral, BID PRN, Augustus Fish MD    melatonin tablet 5 mg, 5 mg, Oral, Nightly, Augustus Fish MD    naproxen (NAPROSYN) tablet 250 mg, 250 mg, Oral, Q8H, Qing Jimenez, KELLY    nicotine (NICODERM CQ) 21 MG/24HR patch 1 patch, 1 patch, Transdermal, Q24H, Augustus Fish MD    OLANZapine (zyPREXA) tablet 5 mg, 5 mg, Oral, Q6H PRN, Augustus Fish MD    ondansetron ODT (ZOFRAN-ODT) disintegrating tablet 4 mg, 4 mg, Oral, Q6H PRN **OR** ondansetron (ZOFRAN) injection 4 mg, 4 mg, Intravenous, Q6H PRN, Augustus Fish MD    oxyCODONE (ROXICODONE) immediate release tablet 20 mg, 20 mg, Oral, Q4H PRN, Augustus Fish MD    pantoprazole (PROTONIX) EC tablet 40 mg, 40 mg, Oral, Daily, Augustus Fish MD    promethazine (PHENERGAN) tablet 12.5 mg, 12.5 mg, Oral, Q6H PRN, Augustus Fish,  "MD    rifAMPin (RIFADIN) capsule 300 mg, 300 mg, Oral, Q12H, Augustus Fish MD    sennosides-docusate (PERICOLACE) 8.6-50 MG per tablet 1 tablet, 1 tablet, Oral, BID, Augustus Fish MD    sodium chloride 0.9 % bolus 250 mL, 250 mL, Intravenous, PRN, Augustus iFsh MD    sodium chloride 0.9 % flush 10 mL, 10 mL, Intravenous, Q12H, Augustus Fish MD    sodium chloride 0.9 % flush 10 mL, 10 mL, Intravenous, PRN, Augustus Fish MD    tamsulosin (FLOMAX) 24 hr capsule 0.4 mg, 0.4 mg, Oral, Nightly, Augustus Fish MD    traZODone (DESYREL) tablet 100 mg, 100 mg, Oral, Nightly, Augustus Fish MD    Data:     Code Status:    There are no questions and answers to display.       Family History   Problem Relation Age of Onset    Colon cancer Neg Hx     Colon polyps Neg Hx        Social History     Socioeconomic History    Marital status: Single   Tobacco Use    Smoking status: Every Day     Current packs/day: 0.25     Types: Cigarettes    Smokeless tobacco: Never   Vaping Use    Vaping status: Some Days    Substances: Nicotine, Flavoring    Devices: Disposable, Pre-filled pod   Substance and Sexual Activity    Alcohol use: No    Drug use: Yes     Types: Methamphetamines    Sexual activity: Yes       Vitals:  Ht 160 cm (63\")   Wt 72.6 kg (160 lb)   BMI 28.34 kg/m²   T 97.4 P 96 R 16 BP 86/54 Sp02 97% (room air)          I/O (24Hr):  No intake or output data in the 24 hours ending 07/07/24 0920    Labs and imaging:      No results found for this or any previous visit (from the past 12 hour(s)).                                Physical Examination:        Physical Exam  Vitals and nursing note reviewed.   Constitutional:       Appearance: Normal appearance.   HENT:      Head: Normocephalic and atraumatic.      Right Ear: External ear normal.      Left Ear: External ear normal.      Nose: Nose normal.      Mouth/Throat:      Mouth: Mucous membranes are dry.      Pharynx: " Oropharynx is clear.   Eyes:      Extraocular Movements: Extraocular movements intact.      Conjunctiva/sclera: Conjunctivae normal.      Pupils: Pupils are equal, round, and reactive to light.   Cardiovascular:      Rate and Rhythm: Normal rate and regular rhythm.      Pulses: Normal pulses.      Heart sounds: Normal heart sounds.   Pulmonary:      Effort: Pulmonary effort is normal.      Breath sounds: Normal breath sounds.   Abdominal:      General: Bowel sounds are normal.      Palpations: Abdomen is soft.   Musculoskeletal:      Cervical back: Normal range of motion and neck supple.      Comments: Generalized weakness  BLE weakness   Skin:     General: Skin is warm and dry.   Neurological:      Mental Status: She is alert and oriented to person, place, and time.   Psychiatric:         Mood and Affect: Mood normal.         Behavior: Behavior normal.           Assessment:          * No active hospital problems. *    Past Medical History:   Diagnosis Date    Anxiety     Depressed     GERD (gastroesophageal reflux disease)     Hepatitis C         Plan:        T8-9 MRSA discitis/osteomyelitis  Polysubstance abuse  History of triscupid valve endocarditis  Hepatitis C  Multifactorial anemia  GERD  Anxiety  Depression    Continue current treatment. Monitor counts. Increase activity. Labs Monday. Continue pain control efforts - will continue to wean pain medication. Wound care per wound care team. Maintain patient safety. Aggressive therapies as tolerated. Continue IV antibiotics under direction of ID.       Electronically signed by KELLY Urena on 7/7/2024 at 09:20 CDT

## 2024-07-08 LAB
ALBUMIN SERPL-MCNC: 3.4 G/DL (ref 3.5–5.2)
ALP SERPL-CCNC: 152 U/L (ref 39–117)
ALT SERPL W P-5'-P-CCNC: 21 U/L (ref 1–33)
ANION GAP SERPL CALCULATED.3IONS-SCNC: 8 MMOL/L (ref 5–15)
AST SERPL-CCNC: 17 U/L (ref 1–32)
BASOPHILS # BLD AUTO: 0.04 10*3/MM3 (ref 0–0.2)
BASOPHILS NFR BLD AUTO: 0.8 % (ref 0–1.5)
BILIRUB CONJ SERPL-MCNC: <0.2 MG/DL (ref 0–0.3)
BILIRUB INDIRECT SERPL-MCNC: ABNORMAL MG/DL
BILIRUB SERPL-MCNC: <0.2 MG/DL (ref 0–1.2)
BUN SERPL-MCNC: 24 MG/DL (ref 6–20)
BUN/CREAT SERPL: 47.1 (ref 7–25)
CALCIUM SPEC-SCNC: 9.1 MG/DL (ref 8.6–10.5)
CHLORIDE SERPL-SCNC: 103 MMOL/L (ref 98–107)
CK SERPL-CCNC: 20 U/L (ref 20–180)
CO2 SERPL-SCNC: 27 MMOL/L (ref 22–29)
CREAT SERPL-MCNC: 0.51 MG/DL (ref 0.57–1)
CRP SERPL-MCNC: <0.3 MG/DL (ref 0–0.5)
DEPRECATED RDW RBC AUTO: 53 FL (ref 37–54)
EGFRCR SERPLBLD CKD-EPI 2021: 125.8 ML/MIN/1.73
EOSINOPHIL # BLD AUTO: 0.31 10*3/MM3 (ref 0–0.4)
EOSINOPHIL NFR BLD AUTO: 6.3 % (ref 0.3–6.2)
ERYTHROCYTE [DISTWIDTH] IN BLOOD BY AUTOMATED COUNT: 16.5 % (ref 12.3–15.4)
ERYTHROCYTE [SEDIMENTATION RATE] IN BLOOD: 28 MM/HR (ref 0–20)
GLUCOSE SERPL-MCNC: 109 MG/DL (ref 65–99)
HCT VFR BLD AUTO: 33.6 % (ref 34–46.6)
HGB BLD-MCNC: 10.3 G/DL (ref 12–15.9)
IMM GRANULOCYTES # BLD AUTO: 0.02 10*3/MM3 (ref 0–0.05)
IMM GRANULOCYTES NFR BLD AUTO: 0.4 % (ref 0–0.5)
LYMPHOCYTES # BLD AUTO: 1.88 10*3/MM3 (ref 0.7–3.1)
LYMPHOCYTES NFR BLD AUTO: 38.1 % (ref 19.6–45.3)
MCH RBC QN AUTO: 26.6 PG (ref 26.6–33)
MCHC RBC AUTO-ENTMCNC: 30.7 G/DL (ref 31.5–35.7)
MCV RBC AUTO: 86.8 FL (ref 79–97)
MONOCYTES # BLD AUTO: 0.57 10*3/MM3 (ref 0.1–0.9)
MONOCYTES NFR BLD AUTO: 11.6 % (ref 5–12)
NEUTROPHILS NFR BLD AUTO: 2.11 10*3/MM3 (ref 1.7–7)
NEUTROPHILS NFR BLD AUTO: 42.8 % (ref 42.7–76)
NRBC BLD AUTO-RTO: 0 /100 WBC (ref 0–0.2)
PLATELET # BLD AUTO: 323 10*3/MM3 (ref 140–450)
PMV BLD AUTO: 8.9 FL (ref 6–12)
POTASSIUM SERPL-SCNC: 4.2 MMOL/L (ref 3.5–5.2)
PROT SERPL-MCNC: 7.2 G/DL (ref 6–8.5)
RBC # BLD AUTO: 3.87 10*6/MM3 (ref 3.77–5.28)
SODIUM SERPL-SCNC: 138 MMOL/L (ref 136–145)
WBC NRBC COR # BLD AUTO: 4.93 10*3/MM3 (ref 3.4–10.8)

## 2024-07-08 PROCEDURE — 82550 ASSAY OF CK (CPK): CPT | Performed by: INTERNAL MEDICINE

## 2024-07-08 PROCEDURE — 85025 COMPLETE CBC W/AUTO DIFF WBC: CPT | Performed by: INTERNAL MEDICINE

## 2024-07-08 PROCEDURE — 99232 SBSQ HOSP IP/OBS MODERATE 35: CPT | Performed by: INTERNAL MEDICINE

## 2024-07-08 PROCEDURE — 80076 HEPATIC FUNCTION PANEL: CPT | Performed by: INTERNAL MEDICINE

## 2024-07-08 PROCEDURE — 85652 RBC SED RATE AUTOMATED: CPT | Performed by: INTERNAL MEDICINE

## 2024-07-08 PROCEDURE — 97530 THERAPEUTIC ACTIVITIES: CPT

## 2024-07-08 PROCEDURE — 86140 C-REACTIVE PROTEIN: CPT | Performed by: INTERNAL MEDICINE

## 2024-07-08 PROCEDURE — 25010000002 DAPTOMYCIN PER 1 MG: Performed by: INTERNAL MEDICINE

## 2024-07-08 PROCEDURE — 97535 SELF CARE MNGMENT TRAINING: CPT

## 2024-07-08 PROCEDURE — 80048 BASIC METABOLIC PNL TOTAL CA: CPT | Performed by: INTERNAL MEDICINE

## 2024-07-08 PROCEDURE — 25010000002 ENOXAPARIN PER 10 MG: Performed by: INTERNAL MEDICINE

## 2024-07-08 NOTE — PROGRESS NOTES
RYAN Tate APRN        Internal Medicine Progress Note    7/8/2024   11:07 CDT    Name:  Jaqui Kate  MRN:    [id number]     Acct:     [id number]   Room:  [facility] Day: 0     Admit Date: 6/25/2024  6:41 PM  PCP: Vanesa Guevara APRN    Subjective:     C/C: pain    Interval History: Status:  stayed the same. Resting in bed. Mother at bedside. Afebrile. Woke from sleep. Maintaining adequate 02 sats on room air. Pain well controlled at present. Requiring increased encouragement to participate in ADLs/therapies today.     Review of Systems   Constitutional: Positive for malaise/fatigue. Negative for chills, decreased appetite, weight gain and weight loss.   HENT:  Negative for congestion, ear discharge, hoarse voice and tinnitus.    Eyes:  Negative for blurred vision, discharge, visual disturbance and visual halos.   Cardiovascular:  Negative for chest pain, claudication, dyspnea on exertion, irregular heartbeat, leg swelling, orthopnea and paroxysmal nocturnal dyspnea.   Respiratory:  Negative for cough, shortness of breath, sputum production and wheezing.    Endocrine: Negative for cold intolerance, heat intolerance and polyuria.   Hematologic/Lymphatic: Negative for adenopathy. Does not bruise/bleed easily.   Skin:  Negative for dry skin, itching and suspicious lesions.   Musculoskeletal:  Positive for joint pain, muscle weakness and myalgias. Negative for arthritis, back pain and falls.   Gastrointestinal:  Negative for abdominal pain, constipation, diarrhea, dysphagia and hematemesis.   Genitourinary:  Negative for bladder incontinence, dysuria and frequency.   Neurological:  Positive for focal weakness and weakness. Negative for aphonia, disturbances in coordination and dizziness.   Psychiatric/Behavioral:  Positive for substance abuse. Negative for altered mental status and depression. The patient is nervous/anxious. The patient does not have insomnia.           Medications:     Allergies: No Known Allergies    Current Meds:   Current Facility-Administered Medications:     acetaminophen (TYLENOL) tablet 650 mg, 650 mg, Oral, Q4H PRN **OR** acetaminophen (TYLENOL) suppository 650 mg, 650 mg, Rectal, Q4H PRN, Augustus Fish MD    bisacodyl (DULCOLAX) suppository 10 mg, 10 mg, Rectal, Daily PRN, Augustus Fish MD    cyclobenzaprine (FLEXERIL) tablet 10 mg, 10 mg, Oral, Q8H PRN, Qing Jimenez APRN    DAPTOmycin (CUBICIN) 600 mg in sodium chloride 0.9 % 50 mL IVPB, 8 mg/kg, Intravenous, Q24H, Augustus Fish MD    diphenhydrAMINE (BENADRYL) capsule 25 mg, 25 mg, Oral, Q6H PRN, Augustus Fish MD    Enoxaparin Sodium (LOVENOX) syringe 40 mg, 40 mg, Subcutaneous, Q24H, Augustus Fish MD    gabapentin (NEURONTIN) capsule 300 mg, 300 mg, Oral, TID, Augustus Fish MD    guaiFENesin (MUCINEX) 12 hr tablet 1,200 mg, 1,200 mg, Oral, Q12H PRN, Augustus Fish MD    lactobacillus acidophilus (RISAQUAD) capsule 2 capsule, 2 capsule, Oral, Daily, Augustus Fish MD    Lidocaine 4 % 1 patch, 1 patch, Transdermal, Q24H, Augustus Fish MD    magnesium hydroxide (MILK OF MAGNESIA) 400 MG/5ML suspension 30 mL, 30 mL, Oral, BID PRN, Augustus Fish MD    melatonin tablet 5 mg, 5 mg, Oral, Nightly, Augustus Fish MD    naproxen (NAPROSYN) tablet 250 mg, 250 mg, Oral, Q8H, Qing Jimenez APRN    nicotine (NICODERM CQ) 21 MG/24HR patch 1 patch, 1 patch, Transdermal, Q24H, Augustus Fish MD    OLANZapine (zyPREXA) tablet 5 mg, 5 mg, Oral, Q6H PRN, Augustus Fish MD    ondansetron ODT (ZOFRAN-ODT) disintegrating tablet 4 mg, 4 mg, Oral, Q6H PRN **OR** ondansetron (ZOFRAN) injection 4 mg, 4 mg, Intravenous, Q6H PRN, Augustus Fish MD    oxyCODONE (ROXICODONE) immediate release tablet 20 mg, 20 mg, Oral, Q4H PRN, Augustus Fish MD    pantoprazole (PROTONIX) EC tablet  "40 mg, 40 mg, Oral, Daily, Augustus Fish MD    promethazine (PHENERGAN) tablet 12.5 mg, 12.5 mg, Oral, Q6H PRN, Augustus Fish MD    rifAMPin (RIFADIN) capsule 300 mg, 300 mg, Oral, Q12H, Augustus Fish MD    sennosides-docusate (PERICOLACE) 8.6-50 MG per tablet 1 tablet, 1 tablet, Oral, BID, Augustus Fish MD    sodium chloride 0.9 % bolus 250 mL, 250 mL, Intravenous, PRN, Augustus Fish MD    sodium chloride 0.9 % flush 10 mL, 10 mL, Intravenous, Q12H, Augustus Fish MD    sodium chloride 0.9 % flush 10 mL, 10 mL, Intravenous, PRN, Augustus Fish MD    tamsulosin (FLOMAX) 24 hr capsule 0.4 mg, 0.4 mg, Oral, Nightly, Augustus Fish MD    traZODone (DESYREL) tablet 100 mg, 100 mg, Oral, Nightly, Augustus Fish MD    Data:     Code Status:    There are no questions and answers to display.       Family History   Problem Relation Age of Onset    Colon cancer Neg Hx     Colon polyps Neg Hx        Social History     Socioeconomic History    Marital status: Single   Tobacco Use    Smoking status: Every Day     Current packs/day: 0.25     Types: Cigarettes    Smokeless tobacco: Never   Vaping Use    Vaping status: Some Days    Substances: Nicotine, Flavoring    Devices: Disposable, Pre-filled pod   Substance and Sexual Activity    Alcohol use: No    Drug use: Yes     Types: Methamphetamines    Sexual activity: Yes       Vitals:  Ht 160 cm (63\")   Wt 74.8 kg (164 lb 14.4 oz)   BMI 29.21 kg/m²   T 97.9 P 99 R 20 /72 Sp02 98% (room air)          I/O (24Hr):  No intake or output data in the 24 hours ending 07/08/24 1107    Labs and imaging:      Recent Results (from the past 12 hour(s))   Basic Metabolic Panel    Collection Time: 07/08/24  5:46 AM    Specimen: Blood   Result Value Ref Range    Glucose 109 (H) 65 - 99 mg/dL    BUN 24 (H) 6 - 20 mg/dL    Creatinine 0.51 (L) 0.57 - 1.00 mg/dL    Sodium 138 136 - 145 mmol/L    Potassium 4.2 3.5 - 5.2 " mmol/L    Chloride 103 98 - 107 mmol/L    CO2 27.0 22.0 - 29.0 mmol/L    Calcium 9.1 8.6 - 10.5 mg/dL    BUN/Creatinine Ratio 47.1 (H) 7.0 - 25.0    Anion Gap 8.0 5.0 - 15.0 mmol/L    eGFR 125.8 >60.0 mL/min/1.73   Sedimentation Rate    Collection Time: 07/08/24  5:46 AM    Specimen: Blood   Result Value Ref Range    Sed Rate 28 (H) 0 - 20 mm/hr   C-reactive Protein    Collection Time: 07/08/24  5:46 AM    Specimen: Blood   Result Value Ref Range    C-Reactive Protein <0.30 0.00 - 0.50 mg/dL   CBC Auto Differential    Collection Time: 07/08/24  5:46 AM    Specimen: Blood   Result Value Ref Range    WBC 4.93 3.40 - 10.80 10*3/mm3    RBC 3.87 3.77 - 5.28 10*6/mm3    Hemoglobin 10.3 (L) 12.0 - 15.9 g/dL    Hematocrit 33.6 (L) 34.0 - 46.6 %    MCV 86.8 79.0 - 97.0 fL    MCH 26.6 26.6 - 33.0 pg    MCHC 30.7 (L) 31.5 - 35.7 g/dL    RDW 16.5 (H) 12.3 - 15.4 %    RDW-SD 53.0 37.0 - 54.0 fl    MPV 8.9 6.0 - 12.0 fL    Platelets 323 140 - 450 10*3/mm3    Neutrophil % 42.8 42.7 - 76.0 %    Lymphocyte % 38.1 19.6 - 45.3 %    Monocyte % 11.6 5.0 - 12.0 %    Eosinophil % 6.3 (H) 0.3 - 6.2 %    Basophil % 0.8 0.0 - 1.5 %    Immature Grans % 0.4 0.0 - 0.5 %    Neutrophils, Absolute 2.11 1.70 - 7.00 10*3/mm3    Lymphocytes, Absolute 1.88 0.70 - 3.10 10*3/mm3    Monocytes, Absolute 0.57 0.10 - 0.90 10*3/mm3    Eosinophils, Absolute 0.31 0.00 - 0.40 10*3/mm3    Basophils, Absolute 0.04 0.00 - 0.20 10*3/mm3    Immature Grans, Absolute 0.02 0.00 - 0.05 10*3/mm3    nRBC 0.0 0.0 - 0.2 /100 WBC   CK    Collection Time: 07/08/24  5:46 AM    Specimen: Blood   Result Value Ref Range    Creatine Kinase 20 20 - 180 U/L                                   Physical Examination:        Physical Exam  Vitals and nursing note reviewed.   Constitutional:       Appearance: Normal appearance.   HENT:      Head: Normocephalic and atraumatic.      Right Ear: External ear normal.      Left Ear: External ear normal.      Nose: Nose normal.      Mouth/Throat:       Mouth: Mucous membranes are dry.      Pharynx: Oropharynx is clear.   Eyes:      Extraocular Movements: Extraocular movements intact.      Conjunctiva/sclera: Conjunctivae normal.      Pupils: Pupils are equal, round, and reactive to light.   Cardiovascular:      Rate and Rhythm: Normal rate and regular rhythm.      Pulses: Normal pulses.      Heart sounds: Normal heart sounds.   Pulmonary:      Effort: Pulmonary effort is normal.      Breath sounds: Normal breath sounds.   Abdominal:      General: Bowel sounds are normal.      Palpations: Abdomen is soft.   Musculoskeletal:      Cervical back: Normal range of motion and neck supple.      Comments: Generalized weakness  BLE weakness   Skin:     General: Skin is warm and dry.   Neurological:      Mental Status: She is alert and oriented to person, place, and time.   Psychiatric:         Mood and Affect: Mood normal.         Behavior: Behavior normal.           Assessment:          * No active hospital problems. *    Past Medical History:   Diagnosis Date    Anxiety     Depressed     GERD (gastroesophageal reflux disease)     Hepatitis C         Plan:        T8-9 MRSA discitis/osteomyelitis  Polysubstance abuse  History of triscupid valve endocarditis  Hepatitis C  Multifactorial anemia  GERD  Anxiety  Depression    Continue current treatment. Monitor counts. Increase activity. Labs Thursday. Continue pain control efforts - will continue to wean pain medication. Wound care per wound care team. Maintain patient safety. Aggressive therapies as tolerated. Continue IV antibiotics under direction of ID.       Electronically signed by KELLY Jones on 7/8/2024 at 11:07 CDT

## 2024-07-08 NOTE — PROGRESS NOTES
"INFECTIOUS DISEASES PROGRESS NOTE    Patient:  Jaqui Kate  YOB: 1990  MRN: [id number]   Admit date: 6/25/2024   Admitting Physician: Augustus Fish MD  Primary Care Physician: Vanesa Guevara APRN    Chief Complaint: MRSA infection      Interval History: Patient reports she is doing well.  She is moving her left leg some on her own.  She inquired about her PICC line dressing change as she thought it looked different than previous.    Tolerating antibiotics.    Allergies: No Known Allergies    Current Scheduled Medications:   DAPTOmycin, 8 mg/kg, Intravenous, Q24H  enoxaparin, 40 mg, Subcutaneous, Q24H  gabapentin, 300 mg, Oral, TID  lactobacillus acidophilus, 2 capsule, Oral, Daily  Lidocaine, 1 patch, Transdermal, Q24H  melatonin, 5 mg, Oral, Nightly  naproxen, 250 mg, Oral, Q8H  nicotine, 1 patch, Transdermal, Q24H  pantoprazole, 40 mg, Oral, Daily  rifAMPin, 300 mg, Oral, Q12H  senna-docusate sodium, 1 tablet, Oral, BID  sodium chloride, 10 mL, Intravenous, Q12H  tamsulosin, 0.4 mg, Oral, Nightly  traZODone, 100 mg, Oral, Nightly      Current PRN Medications:    acetaminophen **OR** acetaminophen    bisacodyl    cyclobenzaprine    diphenhydrAMINE    guaiFENesin    magnesium hydroxide    OLANZapine    ondansetron ODT **OR** ondansetron    oxyCODONE    promethazine    sodium chloride    sodium chloride            Objective     Vital Signs:  No data recorded.      Ht 160 cm (63\")   Wt 74.8 kg (164 lb 14.4 oz)   BMI 29.21 kg/m²     Temperature 97.7    heart rate 96     respiratory rate 14       Physical Exam:  General: Patient is nontoxic-appearing lying in bed in no acute distress.  Angelica RN at bedside having just gotten vital signs  Respiratory: Effort even and unlabored  Right upper extremity PICC line dressing clean dry and intact.  Left lower extremity: Patient able to bend at the knee.  Patient able to straighten her leg and lift her foot off the bed.  Psych: Pleasant " cooperative    Results Review:    I reviewed the patient's new clinical results.    Lab Results:    CBC:   Lab Results   Lab 07/03/24  0401 07/08/24  0546   WBC 5.04 4.93   HEMOGLOBIN 9.9* 10.3*   HEMATOCRIT 32.6* 33.6*   PLATELETS 420 323        AutoDiff:   Lab Results   Lab 07/03/24  0401 07/08/24  0546   NEUTROPHIL % 49.4 42.8   LYMPHOCYTE % 33.9 38.1   MONOCYTES % 10.5 11.6   EOSINOPHIL % 5.4 6.3*   BASOPHIL % 0.4 0.8   NEUTROS ABS 2.49 2.11   LYMPHS ABS 1.71 1.88   MONOS ABS 0.53 0.57   EOS ABS 0.27 0.31   BASOS ABS 0.02 0.04        Manual Diff:    Lab Results   Lab 07/03/24  0401 07/08/24  0546   NEUTROS ABS 2.49 2.11           CMP:   Lab Results   Lab 07/03/24  0401 07/08/24  0546   SODIUM 139 138   POTASSIUM 4.1 4.2   CHLORIDE 103 103   CO2 29.0 27.0   BUN 19 24*   CREATININE 0.65 0.51*   CALCIUM 9.0 9.1   GLUCOSE 98 109*       Estimated Creatinine Clearance: 150.7 mL/min (A) (by C-G formula based on SCr of 0.51 mg/dL (L)).      C-Reactive Protein   Date Value Ref Range Status   07/08/2024 <0.30 0.00 - 0.50 mg/dL Final   07/03/2024 <0.30 0.00 - 0.50 mg/dL Final   07/01/2024 <0.30 0.00 - 0.50 mg/dL Final     Sed Rate   Date Value Ref Range Status   07/08/2024 28 (H) 0 - 20 mm/hr Final   07/04/2024 36 (H) 0 - 20 mm/hr Final   07/01/2024 50 (H) 0 - 20 mm/hr Final       Culture Results:    Microbiology Results (last 10 days)       ** No results found for the last 240 hours. **               CPK          7/1/2024    06:08   Common Labs   Creatine Kinase 19           Radiology:   Imaging Results (Last 72 Hours)       ** No results found for the last 72 hours. **                There are no active hospital problems to display for this patient.      IMPRESSION:  MRSA discitis/osteomyelitis/epidural abscess thoracic spine status post emergent surgical drainage Luna 10 at [facility] in [place].    Recurrent MRSA bacteremia-patient had bacteremia in January 2024, again in March 2024 and then again when  presented back to [facility] in June.  Per [facility] records, blood cultures reported to have cleared Luna 10 .  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  After reviewing outside records, no specific recommendation was made but cultures reveal the patient has options to include tetracycline/doxycycline/minocycline, clindamycin and Bactrim.    Discussed with KELYL Longo MD  07/08/24  10:29 CDT

## 2024-07-09 PROCEDURE — 25010000002 ENOXAPARIN PER 10 MG: Performed by: INTERNAL MEDICINE

## 2024-07-09 PROCEDURE — 97535 SELF CARE MNGMENT TRAINING: CPT

## 2024-07-09 PROCEDURE — 97530 THERAPEUTIC ACTIVITIES: CPT

## 2024-07-09 PROCEDURE — 25010000002 DAPTOMYCIN PER 1 MG: Performed by: INTERNAL MEDICINE

## 2024-07-09 PROCEDURE — 63710000001 ONDANSETRON ODT 4 MG TABLET DISPERSIBLE: Performed by: INTERNAL MEDICINE

## 2024-07-09 NOTE — PROGRESS NOTES
RYAN Tate APRN        Internal Medicine Progress Note    7/9/2024   13:32 CDT    Name:  Jaqui Kate  MRN:    [id number]     Acct:     [id number]   Room:  [facility] Day: 0     Admit Date: 6/25/2024  6:41 PM  PCP: Vaneas Guevara APRN    Subjective:     C/C: pain    Interval History: Status:  stayed the same. Resting in bed. Mother at bedside. Afebrile. C/o menstrual cramps. Maintaining adequate 02 sats on room air. Pain well controlled at present. Reports that she was able to use the slide board independently yesterday. Increased movement and control of left lower extremity.     Review of Systems   Constitutional: Positive for malaise/fatigue. Negative for chills, decreased appetite, weight gain and weight loss.   HENT:  Negative for congestion, ear discharge, hoarse voice and tinnitus.    Eyes:  Negative for blurred vision, discharge, visual disturbance and visual halos.   Cardiovascular:  Negative for chest pain, claudication, dyspnea on exertion, irregular heartbeat, leg swelling, orthopnea and paroxysmal nocturnal dyspnea.   Respiratory:  Negative for cough, shortness of breath, sputum production and wheezing.    Endocrine: Negative for cold intolerance, heat intolerance and polyuria.   Hematologic/Lymphatic: Negative for adenopathy. Does not bruise/bleed easily.   Skin:  Negative for dry skin, itching and suspicious lesions.   Musculoskeletal:  Positive for joint pain, muscle weakness and myalgias. Negative for arthritis, back pain and falls.   Gastrointestinal:  Negative for abdominal pain, constipation, diarrhea, dysphagia and hematemesis.   Genitourinary:  Negative for bladder incontinence, dysuria and frequency.   Neurological:  Positive for focal weakness and weakness. Negative for aphonia, disturbances in coordination and dizziness.   Psychiatric/Behavioral:  Positive for substance abuse. Negative for altered mental status and depression. The patient is  nervous/anxious. The patient does not have insomnia.          Medications:     Allergies: No Known Allergies    Current Meds:   Current Facility-Administered Medications:     acetaminophen (TYLENOL) tablet 650 mg, 650 mg, Oral, Q4H PRN **OR** acetaminophen (TYLENOL) suppository 650 mg, 650 mg, Rectal, Q4H PRN, Augustus Fish MD    bisacodyl (DULCOLAX) suppository 10 mg, 10 mg, Rectal, Daily PRN, Augustus Fish MD    cyclobenzaprine (FLEXERIL) tablet 10 mg, 10 mg, Oral, Q8H PRN, Qing Jimenez APRN    DAPTOmycin (CUBICIN) 600 mg in sodium chloride 0.9 % 50 mL IVPB, 8 mg/kg, Intravenous, Q24H, Augustus Fish MD    diphenhydrAMINE (BENADRYL) capsule 25 mg, 25 mg, Oral, Q6H PRN, Augustus Fish MD    Enoxaparin Sodium (LOVENOX) syringe 40 mg, 40 mg, Subcutaneous, Q24H, Augustus Fish MD    gabapentin (NEURONTIN) capsule 300 mg, 300 mg, Oral, TID, Augustus Fish MD    guaiFENesin (MUCINEX) 12 hr tablet 1,200 mg, 1,200 mg, Oral, Q12H PRN, Augustus Fish MD    lactobacillus acidophilus (RISAQUAD) capsule 2 capsule, 2 capsule, Oral, Daily, Augustus Fish MD    Lidocaine 4 % 1 patch, 1 patch, Transdermal, Q24H, Augustus Fish MD    magnesium hydroxide (MILK OF MAGNESIA) 400 MG/5ML suspension 30 mL, 30 mL, Oral, BID PRN, Augustus Fish MD    melatonin tablet 5 mg, 5 mg, Oral, Nightly, Augustus Fish MD    naproxen (NAPROSYN) tablet 250 mg, 250 mg, Oral, Q8H, Qing Jimenez APRN    nicotine (NICODERM CQ) 21 MG/24HR patch 1 patch, 1 patch, Transdermal, Q24H, Augustus Fish MD    OLANZapine (zyPREXA) tablet 5 mg, 5 mg, Oral, Q6H PRN, Augustus Fish MD    ondansetron ODT (ZOFRAN-ODT) disintegrating tablet 4 mg, 4 mg, Oral, Q6H PRN **OR** ondansetron (ZOFRAN) injection 4 mg, 4 mg, Intravenous, Q6H PRN, Augustus Fish MD    oxyCODONE (ROXICODONE) immediate release tablet 20 mg, 20 mg, Oral, Q4H PRN, Abe  "Augustus Sanford MD    pantoprazole (PROTONIX) EC tablet 40 mg, 40 mg, Oral, Daily, Augustus Fish MD    promethazine (PHENERGAN) tablet 12.5 mg, 12.5 mg, Oral, Q6H PRN, Augustus Fish MD    rifAMPin (RIFADIN) capsule 300 mg, 300 mg, Oral, Q12H, Augustus Fish MD    sennosides-docusate (PERICOLACE) 8.6-50 MG per tablet 1 tablet, 1 tablet, Oral, BID, Augustus Fish MD    sodium chloride 0.9 % bolus 250 mL, 250 mL, Intravenous, PRN, Augustus Fish MD    sodium chloride 0.9 % flush 10 mL, 10 mL, Intravenous, Q12H, Augustus Fish MD    sodium chloride 0.9 % flush 10 mL, 10 mL, Intravenous, PRN, Augustus Fish MD    tamsulosin (FLOMAX) 24 hr capsule 0.4 mg, 0.4 mg, Oral, Nightly, Augustus Fish MD    traZODone (DESYREL) tablet 100 mg, 100 mg, Oral, Nightly, Augustus Fish MD    Data:     Code Status:    There are no questions and answers to display.       Family History   Problem Relation Age of Onset    Colon cancer Neg Hx     Colon polyps Neg Hx        Social History     Socioeconomic History    Marital status: Single   Tobacco Use    Smoking status: Every Day     Current packs/day: 0.25     Types: Cigarettes    Smokeless tobacco: Never   Vaping Use    Vaping status: Some Days    Substances: Nicotine, Flavoring    Devices: Disposable, Pre-filled pod   Substance and Sexual Activity    Alcohol use: No    Drug use: Yes     Types: Methamphetamines    Sexual activity: Yes       Vitals:  Ht 160 cm (63\")   Wt 74.8 kg (164 lb 14.4 oz)   BMI 29.21 kg/m²   T 97.7 P 99 R 16 /65 Sp02 98% (room air)          I/O (24Hr):  No intake or output data in the 24 hours ending 07/09/24 1332    Labs and imaging:      No results found for this or any previous visit (from the past 12 hour(s)).                                  Physical Examination:        Physical Exam  Vitals and nursing note reviewed.   Constitutional:       Appearance: Normal appearance.   HENT:    "   Head: Normocephalic and atraumatic.      Right Ear: External ear normal.      Left Ear: External ear normal.      Nose: Nose normal.      Mouth/Throat:      Mouth: Mucous membranes are dry.      Pharynx: Oropharynx is clear.   Eyes:      Extraocular Movements: Extraocular movements intact.      Conjunctiva/sclera: Conjunctivae normal.      Pupils: Pupils are equal, round, and reactive to light.   Cardiovascular:      Rate and Rhythm: Normal rate and regular rhythm.      Pulses: Normal pulses.      Heart sounds: Normal heart sounds.   Pulmonary:      Effort: Pulmonary effort is normal.      Breath sounds: Normal breath sounds.   Abdominal:      General: Bowel sounds are normal.      Palpations: Abdomen is soft.   Musculoskeletal:      Cervical back: Normal range of motion and neck supple.      Comments: Generalized weakness  BLE weakness   Skin:     General: Skin is warm and dry.   Neurological:      Mental Status: She is alert and oriented to person, place, and time.   Psychiatric:         Mood and Affect: Mood normal.         Behavior: Behavior normal.         Assessment:          * No active hospital problems. *    Past Medical History:   Diagnosis Date    Anxiety     Depressed     GERD (gastroesophageal reflux disease)     Hepatitis C         Plan:        T8-9 MRSA discitis/osteomyelitis  Polysubstance abuse  History of triscupid valve endocarditis  Hepatitis C  Multifactorial anemia  GERD  Anxiety  Depression    Continue current treatment. Monitor counts. Increase activity. Labs Thursday. Continue pain control efforts - will continue to wean pain medication. Wound care per wound care team. Maintain patient safety. Aggressive therapies as tolerated. Continue IV antibiotics under direction of ID.       Electronically signed by KELLY Jones on 7/9/2024 at 13:32 CDT     I have discussed the care of Jaqui Kate, including pertinent history and exam findings, with the nurse practitioner.    I have seen  and examined the patient and the key elements of all parts of the encounter have been performed by me.  I agree with the assessment, plan and orders as documented by KELLY Philip, after I modified the exam findings and the plan of treatments and the final version is my approved version of the assessment.        Electronically signed by Augustus Fish MD on 7/9/2024 at 17:09 CDT

## 2024-07-10 PROCEDURE — 97164 PT RE-EVAL EST PLAN CARE: CPT | Performed by: PHYSICAL THERAPIST

## 2024-07-10 PROCEDURE — 25010000002 DAPTOMYCIN PER 1 MG: Performed by: INTERNAL MEDICINE

## 2024-07-10 PROCEDURE — 25010000002 ENOXAPARIN PER 10 MG: Performed by: INTERNAL MEDICINE

## 2024-07-10 PROCEDURE — 97168 OT RE-EVAL EST PLAN CARE: CPT | Performed by: OCCUPATIONAL THERAPIST

## 2024-07-10 PROCEDURE — 97116 GAIT TRAINING THERAPY: CPT | Performed by: PHYSICAL THERAPIST

## 2024-07-10 PROCEDURE — 99231 SBSQ HOSP IP/OBS SF/LOW 25: CPT | Performed by: INTERNAL MEDICINE

## 2024-07-10 NOTE — PROGRESS NOTES
RYAN Tate APRN        Internal Medicine Progress Note    7/10/2024   10:12 CDT    Name:  Jaqui Kate  MRN:    [id number]     Acct:     [id number]   Room:  [facility] Day: 0     Admit Date: 6/25/2024  6:41 PM  PCP: Vanesa Guevara APRN    Subjective:     C/C: pain    Interval History: Status:  stayed the same. Resting in bed. No family at bedside. Sleeping. Afebrile.  Maintaining adequate 02 sats on room air. Pain well controlled at present. Progressing with therapies.     Review of Systems   Constitutional: Positive for malaise/fatigue. Negative for chills, decreased appetite, weight gain and weight loss.   HENT:  Negative for congestion, ear discharge, hoarse voice and tinnitus.    Eyes:  Negative for blurred vision, discharge, visual disturbance and visual halos.   Cardiovascular:  Negative for chest pain, claudication, dyspnea on exertion, irregular heartbeat, leg swelling, orthopnea and paroxysmal nocturnal dyspnea.   Respiratory:  Negative for cough, shortness of breath, sputum production and wheezing.    Endocrine: Negative for cold intolerance, heat intolerance and polyuria.   Hematologic/Lymphatic: Negative for adenopathy. Does not bruise/bleed easily.   Skin:  Negative for dry skin, itching and suspicious lesions.   Musculoskeletal:  Positive for joint pain, muscle weakness and myalgias. Negative for arthritis, back pain and falls.   Gastrointestinal:  Negative for abdominal pain, constipation, diarrhea, dysphagia and hematemesis.   Genitourinary:  Negative for bladder incontinence, dysuria and frequency.   Neurological:  Positive for focal weakness and weakness. Negative for aphonia, disturbances in coordination and dizziness.   Psychiatric/Behavioral:  Positive for substance abuse. Negative for altered mental status and depression. The patient is nervous/anxious. The patient does not have insomnia.          Medications:     Allergies: No Known  Allergies    Current Meds:   Current Facility-Administered Medications:     acetaminophen (TYLENOL) tablet 650 mg, 650 mg, Oral, Q4H PRN **OR** acetaminophen (TYLENOL) suppository 650 mg, 650 mg, Rectal, Q4H PRN, Augustus Fish MD    bisacodyl (DULCOLAX) suppository 10 mg, 10 mg, Rectal, Daily PRN, Augustus Fish MD    cyclobenzaprine (FLEXERIL) tablet 10 mg, 10 mg, Oral, Q8H PRN, Qing Jimenez APRN    DAPTOmycin (CUBICIN) 600 mg in sodium chloride 0.9 % 50 mL IVPB, 8 mg/kg, Intravenous, Q24H, Augustus Fish MD    diphenhydrAMINE (BENADRYL) capsule 25 mg, 25 mg, Oral, Q6H PRN, Augustus Fish MD    Enoxaparin Sodium (LOVENOX) syringe 40 mg, 40 mg, Subcutaneous, Q24H, Augustus Fish MD    gabapentin (NEURONTIN) capsule 300 mg, 300 mg, Oral, TID, Augustus Fish MD    guaiFENesin (MUCINEX) 12 hr tablet 1,200 mg, 1,200 mg, Oral, Q12H PRN, Augustus Fish MD    lactobacillus acidophilus (RISAQUAD) capsule 2 capsule, 2 capsule, Oral, Daily, Augustus Fish MD    Lidocaine 4 % 1 patch, 1 patch, Transdermal, Q24H, Augustus Fish MD    magnesium hydroxide (MILK OF MAGNESIA) 400 MG/5ML suspension 30 mL, 30 mL, Oral, BID PRN, Augustus Fish MD    melatonin tablet 5 mg, 5 mg, Oral, Nightly, Augustus Fish MD    naproxen (NAPROSYN) tablet 250 mg, 250 mg, Oral, Q8H, Qing Jimenez APRN    nicotine (NICODERM CQ) 21 MG/24HR patch 1 patch, 1 patch, Transdermal, Q24H, Augustus Fish MD    OLANZapine (zyPREXA) tablet 5 mg, 5 mg, Oral, Q6H PRN, Augustus Fish MD    ondansetron ODT (ZOFRAN-ODT) disintegrating tablet 4 mg, 4 mg, Oral, Q6H PRN **OR** ondansetron (ZOFRAN) injection 4 mg, 4 mg, Intravenous, Q6H PRN, Augustus Fish MD    oxyCODONE (ROXICODONE) immediate release tablet 20 mg, 20 mg, Oral, Q4H PRN, Augustus Fsih MD    pantoprazole (PROTONIX) EC tablet 40 mg, 40 mg, Oral, Daily, Augustus Fish  "MD Juvenal    promethazine (PHENERGAN) tablet 12.5 mg, 12.5 mg, Oral, Q6H PRN, Augustus Fish MD    rifAMPin (RIFADIN) capsule 300 mg, 300 mg, Oral, Q12H, Augustus Fish MD    sennosides-docusate (PERICOLACE) 8.6-50 MG per tablet 1 tablet, 1 tablet, Oral, BID, Augustus Fish MD    sodium chloride 0.9 % bolus 250 mL, 250 mL, Intravenous, PRN, Augustus Fish MD    sodium chloride 0.9 % flush 10 mL, 10 mL, Intravenous, Q12H, Augustus Fish MD    sodium chloride 0.9 % flush 10 mL, 10 mL, Intravenous, PRN, Augustus Fish MD    tamsulosin (FLOMAX) 24 hr capsule 0.4 mg, 0.4 mg, Oral, Nightly, Augustus Fish MD    traZODone (DESYREL) tablet 100 mg, 100 mg, Oral, Nightly, Augustus Fish MD    Data:     Code Status:    There are no questions and answers to display.       Family History   Problem Relation Age of Onset    Colon cancer Neg Hx     Colon polyps Neg Hx        Social History     Socioeconomic History    Marital status: Single   Tobacco Use    Smoking status: Every Day     Current packs/day: 0.25     Types: Cigarettes    Smokeless tobacco: Never   Vaping Use    Vaping status: Some Days    Substances: Nicotine, Flavoring    Devices: Disposable, Pre-filled pod   Substance and Sexual Activity    Alcohol use: No    Drug use: Yes     Types: Methamphetamines    Sexual activity: Yes       Vitals:  Ht 160 cm (63\")   Wt 74.8 kg (164 lb 14.4 oz)   BMI 29.21 kg/m²   T 97.8 P 98 R 16 /66 Sp02 98% (room air)          I/O (24Hr):  No intake or output data in the 24 hours ending 07/10/24 1012    Labs and imaging:      No results found for this or any previous visit (from the past 12 hour(s)).                                  Physical Examination:        Physical Exam  Vitals and nursing note reviewed.   Constitutional:       Appearance: Normal appearance.   HENT:      Head: Normocephalic and atraumatic.      Right Ear: External ear normal.      Left Ear: " External ear normal.      Nose: Nose normal.      Mouth/Throat:      Mouth: Mucous membranes are dry.      Pharynx: Oropharynx is clear.   Eyes:      Extraocular Movements: Extraocular movements intact.      Conjunctiva/sclera: Conjunctivae normal.      Pupils: Pupils are equal, round, and reactive to light.   Cardiovascular:      Rate and Rhythm: Normal rate and regular rhythm.      Pulses: Normal pulses.      Heart sounds: Normal heart sounds.   Pulmonary:      Effort: Pulmonary effort is normal.      Breath sounds: Normal breath sounds.   Abdominal:      General: Bowel sounds are normal.      Palpations: Abdomen is soft.   Musculoskeletal:      Cervical back: Normal range of motion and neck supple.      Comments: Generalized weakness  BLE weakness   Skin:     General: Skin is warm and dry.   Neurological:      Mental Status: She is alert and oriented to person, place, and time.   Psychiatric:         Mood and Affect: Mood normal.         Behavior: Behavior normal.           Assessment:          * No active hospital problems. *    Past Medical History:   Diagnosis Date    Anxiety     Depressed     GERD (gastroesophageal reflux disease)     Hepatitis C         Plan:        T8-9 MRSA discitis/osteomyelitis  Polysubstance abuse  History of triscupid valve endocarditis  Hepatitis C  Multifactorial anemia  GERD  Anxiety  Depression    Continue current treatment. Monitor counts. Increase activity. Labs in am. Continue pain control efforts - will continue to wean pain medication. Wound care per wound care team. Maintain patient safety. Aggressive therapies as tolerated. Continue IV antibiotics under direction of ID.       Electronically signed by KELLY Jones on 7/10/2024 at 10:12 CDT     I have discussed the care of Jaqui Kate, including pertinent history and exam findings, with the nurse practitioner.    I have seen and examined the patient and the key elements of all parts of the encounter have been  performed by me.  I agree with the assessment, plan and orders as documented by KELLY Philip, after I modified the exam findings and the plan of treatments and the final version is my approved version of the assessment.        Electronically signed by Augustus Fish MD on 7/10/2024 at 11:14 CDT

## 2024-07-10 NOTE — PROGRESS NOTES
"INFECTIOUS DISEASES PROGRESS NOTE    Patient:  Jaqui Kate  YOB: 1990  MRN: [id number]   Admit date: 6/25/2024   Admitting Physician: Augustus Fish MD  Primary Care Physician: Vanesa Guevara APRN    Chief Complaint: MRSA infection      Interval History: Patient reports she is doing well.  She is moving her left leg some on her own.  She inquired about her PICC line dressing change as she thought it looked different than previous.    Tolerating antibiotics.    Allergies: No Known Allergies    Current Scheduled Medications:   DAPTOmycin, 8 mg/kg, Intravenous, Q24H  enoxaparin, 40 mg, Subcutaneous, Q24H  gabapentin, 300 mg, Oral, TID  lactobacillus acidophilus, 2 capsule, Oral, Daily  Lidocaine, 1 patch, Transdermal, Q24H  melatonin, 5 mg, Oral, Nightly  naproxen, 250 mg, Oral, Q8H  nicotine, 1 patch, Transdermal, Q24H  pantoprazole, 40 mg, Oral, Daily  rifAMPin, 300 mg, Oral, Q12H  senna-docusate sodium, 1 tablet, Oral, BID  sodium chloride, 10 mL, Intravenous, Q12H  tamsulosin, 0.4 mg, Oral, Nightly  traZODone, 100 mg, Oral, Nightly      Current PRN Medications:    acetaminophen **OR** acetaminophen    bisacodyl    cyclobenzaprine    diphenhydrAMINE    guaiFENesin    magnesium hydroxide    OLANZapine    ondansetron ODT **OR** ondansetron    oxyCODONE    promethazine    sodium chloride    sodium chloride            Objective     Vital Signs:  No data recorded.      Ht 160 cm (63\")   Wt 74.8 kg (164 lb 14.4 oz)   BMI 29.21 kg/m²     Temperature 97.8    heart rate 9816     respiratory rate    blood pressure 108/66    Physical Exam:  General: Patient lying in bed in no acute distress  Neuro: Patient continues to be able to elevate left leg off the bed and bend leg up at the knee.  Right upper extremity PICC line dressing clean dry and intact    Results Review:    I reviewed the patient's new clinical results.    Lab Results:    CBC:   Lab Results   Lab 07/08/24  0546   WBC 4.93 "   HEMOGLOBIN 10.3*   HEMATOCRIT 33.6*   PLATELETS 323        AutoDiff:   Lab Results   Lab 07/08/24  0546   NEUTROPHIL % 42.8   LYMPHOCYTE % 38.1   MONOCYTES % 11.6   EOSINOPHIL % 6.3*   BASOPHIL % 0.8   NEUTROS ABS 2.11   LYMPHS ABS 1.88   MONOS ABS 0.57   EOS ABS 0.31   BASOS ABS 0.04        Manual Diff:    Lab Results   Lab 07/08/24  0546   NEUTROS ABS 2.11           CMP:   Lab Results   Lab 07/08/24  0546   SODIUM 138   POTASSIUM 4.2   CHLORIDE 103   CO2 27.0   BUN 24*   CREATININE 0.51*   CALCIUM 9.1   BILIRUBIN <0.2   ALK PHOS 152*   ALT (SGPT) 21   AST (SGOT) 17   GLUCOSE 109*       Estimated Creatinine Clearance: 150.7 mL/min (A) (by C-G formula based on SCr of 0.51 mg/dL (L)).      C-Reactive Protein   Date Value Ref Range Status   07/08/2024 <0.30 0.00 - 0.50 mg/dL Final   07/03/2024 <0.30 0.00 - 0.50 mg/dL Final   07/01/2024 <0.30 0.00 - 0.50 mg/dL Final     Sed Rate   Date Value Ref Range Status   07/08/2024 28 (H) 0 - 20 mm/hr Final   07/04/2024 36 (H) 0 - 20 mm/hr Final   07/01/2024 50 (H) 0 - 20 mm/hr Final       Culture Results:    Microbiology Results (last 10 days)       ** No results found for the last 240 hours. **               CPK          7/8/2024    05:46   Common Labs   Creatine Kinase 20           Radiology:   Imaging Results (Last 72 Hours)       ** No results found for the last 72 hours. **                There are no active hospital problems to display for this patient.      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    Recurrent MRSA bacteremia-patient had bacteremia in January 2024 (hospitalized at [facility] at that time), again in March 2024 and then again when presented back to [place] in June.  Per [facility] records, blood cultures reported to have cleared Luna 10 .  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  Per records from [place], patient MRSA was susceptible to Bactrim, clindamycin and tetracycline.    Continue weekly monitoring of CK while on daptomycin  Upon discharge will need referral for hepatitis C treatment        Debby Gastelum MD  07/10/24  12:58 CDT

## 2024-07-11 PROCEDURE — 97110 THERAPEUTIC EXERCISES: CPT

## 2024-07-11 PROCEDURE — 25010000002 ENOXAPARIN PER 10 MG: Performed by: INTERNAL MEDICINE

## 2024-07-11 PROCEDURE — 97530 THERAPEUTIC ACTIVITIES: CPT

## 2024-07-11 PROCEDURE — 25010000002 DAPTOMYCIN PER 1 MG: Performed by: INTERNAL MEDICINE

## 2024-07-11 PROCEDURE — 97535 SELF CARE MNGMENT TRAINING: CPT

## 2024-07-11 RX ORDER — OXYCODONE HYDROCHLORIDE 5 MG/1
20 TABLET ORAL EVERY 4 HOURS PRN
Status: DISPENSED | OUTPATIENT
Start: 2024-07-11 | End: 2024-07-16

## 2024-07-11 NOTE — PROGRESS NOTES
Inpatient Nutrition Services  Patient Name:  Jaqui Kate  YOB: 1990  MRN: [id number]  Admit Date:  6/25/2024  Assessment Date:  7/11/2024     Reason for Assessment       Row Name 07/11/24 1229          Reason for Assessment    Diagnosis infection/sepsis                    Nutrition/Diet History       Row Name 07/11/24 1229          Nutrition/Diet History    Typical Intake (Food/Fluid/EN/PN) Receiving daily menu selections. Foods/snacks from OSH in room. Good appetite. No requests or concerns. Weight 164.9lb. Continue current nutrition care plan.                    Labs/Tests/Procedures/Meds       Row Name 07/11/24 1229          Labs/Procedures/Meds    Lab Results Reviewed reviewed        Diagnostic Tests/Procedures    Diagnostic Test/Procedure Reviewed reviewed        Medications    Pertinent Medications Reviewed reviewed                    Physical Findings       Row Name 07/11/24 1229          Physical Findings    Overall Physical Appearance Room air, no edema, BM 7/8, back incision                    Estimated/Assessed Needs - Anthropometrics       Row Name 07/11/24 1229          Anthropometrics    Weight for Calculation 72.6 kg (160 lb)        Estimated/Assessed Needs    Additional Documentation Fluid Requirements (Group);KCAL/KG (Group);Protein Requirements (Group)        KCAL/KG    KCAL/KG 25 Kcal/Kg (kcal)     25 Kcal/Kg (kcal) 1814.4        Protein Requirements    Weight Used For Protein Calculations 72.6 kg (160 lb)     Est Protein Requirement Amount (gms/kg) 1.2 gm protein     Estimated Protein Requirements (gms/day) 87.09        Fluid Requirements    Fluid Requirements (mL/day) 1814     RDA Method (mL) 1814                    Nutrition Prescription Ordered       Row Name 07/11/24 1230          Nutrition Prescription PO    Current PO Diet Regular                    Evaluation of Received Nutrient/Fluid Intake       Row Name 07/11/24 1230          Nutrient/Fluid Evaluation    Number of Days  Evaluated 3 days        Fluid Intake Evaluation    Oral Fluid (mL) 1933        PO Evaluation    Number of Days PO Intake Evaluated 3 days     Number of Meals 8     % PO Intake 84; 0% x 1 meal during 72-hr                       Problem/Interventions:   Problem 1       Row Name 07/11/24 1230          Nutrition Diagnoses Problem 1    Problem 1 Nutrition Appropriate for Condition at this Time                          Intervention Goal       Row Name 07/11/24 1230          Intervention Goal    General Disease management/therapy;Meet nutritional needs for age/condition;Reduce/improve symptoms     PO Tolerate PO;Meet estimated needs     Weight Maintain weight                    Nutrition Intervention       Row Name 07/11/24 1230          Nutrition Intervention    RD/Tech Action Follow Tx progress;Care plan reviewd                    Nutrition Prescription       Row Name 07/11/24 1230          Other Orders    Other continue same protocol                    Education/Evaluation       Row Name 07/11/24 1231          Education    Education No discharge needs identified at this time        Monitor/Evaluation    Monitor Per protocol                     Electronically signed by:  Mar Lopez RDN, LD  07/11/24 12:31 CDT

## 2024-07-11 NOTE — PROGRESS NOTES
RYAN Tate APRN        Internal Medicine Progress Note    7/11/2024   14:10 CDT    Name:  Jaqui Kate  MRN:    [id number]     Acct:     [id number]   Room:  [facility] Day: 0     Admit Date: 6/25/2024  6:41 PM  PCP: Vanesa Guevara APRN    Subjective:     C/C: pain    Interval History: Status:  stayed the same. Resting in bed. No family at bedside. Afebrile.  Maintaining adequate 02 sats on room air. Pain well controlled at present. Progressing with therapies. Getting some movement and control of RLE. Counts stable.     Review of Systems   Constitutional: Positive for malaise/fatigue. Negative for chills, decreased appetite, weight gain and weight loss.   HENT:  Negative for congestion, ear discharge, hoarse voice and tinnitus.    Eyes:  Negative for blurred vision, discharge, visual disturbance and visual halos.   Cardiovascular:  Negative for chest pain, claudication, dyspnea on exertion, irregular heartbeat, leg swelling, orthopnea and paroxysmal nocturnal dyspnea.   Respiratory:  Negative for cough, shortness of breath, sputum production and wheezing.    Endocrine: Negative for cold intolerance, heat intolerance and polyuria.   Hematologic/Lymphatic: Negative for adenopathy. Does not bruise/bleed easily.   Skin:  Negative for dry skin, itching and suspicious lesions.   Musculoskeletal:  Positive for joint pain, muscle weakness and myalgias. Negative for arthritis, back pain and falls.   Gastrointestinal:  Negative for abdominal pain, constipation, diarrhea, dysphagia and hematemesis.   Genitourinary:  Negative for bladder incontinence, dysuria and frequency.   Neurological:  Positive for focal weakness and weakness. Negative for aphonia, disturbances in coordination and dizziness.   Psychiatric/Behavioral:  Positive for substance abuse. Negative for altered mental status and depression. The patient is nervous/anxious. The patient does not have insomnia.           Medications:     Allergies: No Known Allergies    Current Meds:   Current Facility-Administered Medications:     acetaminophen (TYLENOL) tablet 650 mg, 650 mg, Oral, Q4H PRN **OR** acetaminophen (TYLENOL) suppository 650 mg, 650 mg, Rectal, Q4H PRN, Augustus Fish MD    bisacodyl (DULCOLAX) suppository 10 mg, 10 mg, Rectal, Daily PRN, Augustus Fish MD    cyclobenzaprine (FLEXERIL) tablet 10 mg, 10 mg, Oral, Q8H PRN, Qing Jimenez APRN    DAPTOmycin (CUBICIN) 600 mg in sodium chloride 0.9 % 50 mL IVPB, 8 mg/kg, Intravenous, Q24H, Augustus Fish MD    diphenhydrAMINE (BENADRYL) capsule 25 mg, 25 mg, Oral, Q6H PRN, Augustus Fish MD    Enoxaparin Sodium (LOVENOX) syringe 40 mg, 40 mg, Subcutaneous, Q24H, Augustus Fish MD    gabapentin (NEURONTIN) capsule 300 mg, 300 mg, Oral, TID, Augustus Fish MD    guaiFENesin (MUCINEX) 12 hr tablet 1,200 mg, 1,200 mg, Oral, Q12H PRN, Augustus Fish MD    lactobacillus acidophilus (RISAQUAD) capsule 2 capsule, 2 capsule, Oral, Daily, Augustus Fish MD    Lidocaine 4 % 1 patch, 1 patch, Transdermal, Q24H, Augustus Fish MD    magnesium hydroxide (MILK OF MAGNESIA) 400 MG/5ML suspension 30 mL, 30 mL, Oral, BID PRN, Augustus Fish MD    melatonin tablet 5 mg, 5 mg, Oral, Nightly, Augustus Fish MD    naproxen (NAPROSYN) tablet 250 mg, 250 mg, Oral, Q8H, Qing Jimeenz APRN    nicotine (NICODERM CQ) 21 MG/24HR patch 1 patch, 1 patch, Transdermal, Q24H, Augustus Fish MD    OLANZapine (zyPREXA) tablet 5 mg, 5 mg, Oral, Q6H PRN, Augustus Fish MD    ondansetron ODT (ZOFRAN-ODT) disintegrating tablet 4 mg, 4 mg, Oral, Q6H PRN **OR** ondansetron (ZOFRAN) injection 4 mg, 4 mg, Intravenous, Q6H PRN, Augustus Fish MD    oxyCODONE (ROXICODONE) immediate release tablet 20 mg, 20 mg, Oral, Q4H PRN, Augustus Fish MD    pantoprazole (PROTONIX) EC tablet  "40 mg, 40 mg, Oral, Daily, Augustus Fish MD    promethazine (PHENERGAN) tablet 12.5 mg, 12.5 mg, Oral, Q6H PRN, Augustus Fish MD    rifAMPin (RIFADIN) capsule 300 mg, 300 mg, Oral, Q12H, Augustus Fish MD    sennosides-docusate (PERICOLACE) 8.6-50 MG per tablet 1 tablet, 1 tablet, Oral, BID, Augustus Fish MD    sodium chloride 0.9 % bolus 250 mL, 250 mL, Intravenous, PRN, Augustus Fish MD    sodium chloride 0.9 % flush 10 mL, 10 mL, Intravenous, Q12H, Augustus Fish MD    sodium chloride 0.9 % flush 10 mL, 10 mL, Intravenous, PRN, Augustus Fish MD    tamsulosin (FLOMAX) 24 hr capsule 0.4 mg, 0.4 mg, Oral, Nightly, Augustus Fish MD    traZODone (DESYREL) tablet 100 mg, 100 mg, Oral, Nightly, Augustus Fish MD    Data:     Code Status:    There are no questions and answers to display.       Family History   Problem Relation Age of Onset    Colon cancer Neg Hx     Colon polyps Neg Hx        Social History     Socioeconomic History    Marital status: Single   Tobacco Use    Smoking status: Every Day     Current packs/day: 0.25     Types: Cigarettes    Smokeless tobacco: Never   Vaping Use    Vaping status: Some Days    Substances: Nicotine, Flavoring    Devices: Disposable, Pre-filled pod   Substance and Sexual Activity    Alcohol use: No    Drug use: Yes     Types: Methamphetamines    Sexual activity: Yes       Vitals:  Ht 160 cm (63\")   Wt 74.8 kg (164 lb 14.4 oz)   BMI 29.21 kg/m²   T 97.8 P 98 R 16 /66 Sp02 98% (room air)          I/O (24Hr):  No intake or output data in the 24 hours ending 07/11/24 1410    Labs and imaging:      No results found for this or any previous visit (from the past 12 hour(s)).                                  Physical Examination:        Physical Exam  Vitals and nursing note reviewed.   Constitutional:       Appearance: Normal appearance.   HENT:      Head: Normocephalic and atraumatic.      Right Ear: " External ear normal.      Left Ear: External ear normal.      Nose: Nose normal.      Mouth/Throat:      Mouth: Mucous membranes are dry.      Pharynx: Oropharynx is clear.   Eyes:      Extraocular Movements: Extraocular movements intact.      Conjunctiva/sclera: Conjunctivae normal.      Pupils: Pupils are equal, round, and reactive to light.   Cardiovascular:      Rate and Rhythm: Normal rate and regular rhythm.      Pulses: Normal pulses.      Heart sounds: Normal heart sounds.   Pulmonary:      Effort: Pulmonary effort is normal.      Breath sounds: Normal breath sounds.   Abdominal:      General: Bowel sounds are normal.      Palpations: Abdomen is soft.   Musculoskeletal:      Cervical back: Normal range of motion and neck supple.      Comments: Generalized weakness  BLE weakness   Skin:     General: Skin is warm and dry.   Neurological:      Mental Status: She is alert and oriented to person, place, and time.   Psychiatric:         Mood and Affect: Mood normal.         Behavior: Behavior normal.           Assessment:          * No active hospital problems. *    Past Medical History:   Diagnosis Date    Anxiety     Depressed     GERD (gastroesophageal reflux disease)     Hepatitis C         Plan:        T8-9 MRSA discitis/osteomyelitis  Polysubstance abuse  History of triscupid valve endocarditis  Hepatitis C  Multifactorial anemia  GERD  Anxiety  Depression    Continue current treatment. Monitor counts. Increase activity. Labs Monday. Continue pain control efforts - will continue to wean pain medication. Wound care per wound care team. Maintain patient safety. Aggressive therapies as tolerated. Continue IV antibiotics under direction of ID.       Electronically signed by KELLY Jones on 7/11/2024 at 14:10 CDT

## 2024-07-12 PROCEDURE — 97110 THERAPEUTIC EXERCISES: CPT

## 2024-07-12 PROCEDURE — 25010000002 ENOXAPARIN PER 10 MG: Performed by: INTERNAL MEDICINE

## 2024-07-12 PROCEDURE — 99231 SBSQ HOSP IP/OBS SF/LOW 25: CPT | Performed by: INTERNAL MEDICINE

## 2024-07-12 PROCEDURE — 25010000002 DAPTOMYCIN PER 1 MG: Performed by: INTERNAL MEDICINE

## 2024-07-12 PROCEDURE — 25010000002 ONDANSETRON PER 1 MG: Performed by: INTERNAL MEDICINE

## 2024-07-12 PROCEDURE — 97530 THERAPEUTIC ACTIVITIES: CPT

## 2024-07-12 PROCEDURE — 97535 SELF CARE MNGMENT TRAINING: CPT

## 2024-07-12 NOTE — PROGRESS NOTES
RYAN Tate APRN        Internal Medicine Progress Note    7/12/2024   11:33 CDT    Name:  Jaqui Kate  MRN:    [id number]     Acct:     [id number]   Room:  [facility] Day: 0     Admit Date: 6/25/2024  6:41 PM  PCP: Vanesa Guevara APRN    Subjective:     C/C: pain    Interval History: Status:  stayed the same. Resting in bed. Awakened from sleep. Denies pain at present. Breakfast tray at bedside. No overnight issues per staff.     Review of Systems   Constitutional: Positive for malaise/fatigue. Negative for chills, decreased appetite, weight gain and weight loss.   HENT:  Negative for congestion, ear discharge, hoarse voice and tinnitus.    Eyes:  Negative for blurred vision, discharge, visual disturbance and visual halos.   Cardiovascular:  Negative for chest pain, claudication, dyspnea on exertion, irregular heartbeat, leg swelling, orthopnea and paroxysmal nocturnal dyspnea.   Respiratory:  Negative for cough, shortness of breath, sputum production and wheezing.    Endocrine: Negative for cold intolerance, heat intolerance and polyuria.   Hematologic/Lymphatic: Negative for adenopathy. Does not bruise/bleed easily.   Skin:  Negative for dry skin, itching and suspicious lesions.   Musculoskeletal:  Positive for joint pain, muscle weakness and myalgias. Negative for arthritis, back pain and falls.   Gastrointestinal:  Negative for abdominal pain, constipation, diarrhea, dysphagia and hematemesis.   Genitourinary:  Negative for bladder incontinence, dysuria and frequency.   Neurological:  Positive for focal weakness and weakness. Negative for aphonia, disturbances in coordination and dizziness.   Psychiatric/Behavioral:  Positive for substance abuse. Negative for altered mental status and depression. The patient is nervous/anxious. The patient does not have insomnia.          Medications:     Allergies: No Known Allergies    Current Meds:   Current  Facility-Administered Medications:     acetaminophen (TYLENOL) tablet 650 mg, 650 mg, Oral, Q4H PRN **OR** acetaminophen (TYLENOL) suppository 650 mg, 650 mg, Rectal, Q4H PRN, Augustus Fish MD    bisacodyl (DULCOLAX) suppository 10 mg, 10 mg, Rectal, Daily PRN, Augustus Fish MD    cyclobenzaprine (FLEXERIL) tablet 10 mg, 10 mg, Oral, Q8H PRN, Qing Jimenez, KELLY    DAPTOmycin (CUBICIN) 600 mg in sodium chloride 0.9 % 50 mL IVPB, 8 mg/kg, Intravenous, Q24H, Augustus Fish MD    diphenhydrAMINE (BENADRYL) capsule 25 mg, 25 mg, Oral, Q6H PRN, Augustus Fish MD    Enoxaparin Sodium (LOVENOX) syringe 40 mg, 40 mg, Subcutaneous, Q24H, Augustus Fish MD    gabapentin (NEURONTIN) capsule 300 mg, 300 mg, Oral, TID, Augustus Fish MD    guaiFENesin (MUCINEX) 12 hr tablet 1,200 mg, 1,200 mg, Oral, Q12H PRN, Augustus Fish MD    lactobacillus acidophilus (RISAQUAD) capsule 2 capsule, 2 capsule, Oral, Daily, Augustus Fish MD    Lidocaine 4 % 1 patch, 1 patch, Transdermal, Q24H, Augustus Fish MD    magnesium hydroxide (MILK OF MAGNESIA) 400 MG/5ML suspension 30 mL, 30 mL, Oral, BID PRN, Augustus Fish MD    melatonin tablet 5 mg, 5 mg, Oral, Nightly, Augustus Fish MD    naproxen (NAPROSYN) tablet 250 mg, 250 mg, Oral, Q8H, Qing Jimenez APRN    nicotine (NICODERM CQ) 21 MG/24HR patch 1 patch, 1 patch, Transdermal, Q24H, Augustus Fish MD    OLANZapine (zyPREXA) tablet 5 mg, 5 mg, Oral, Q6H PRN, Augustus Fish MD    ondansetron ODT (ZOFRAN-ODT) disintegrating tablet 4 mg, 4 mg, Oral, Q6H PRN **OR** ondansetron (ZOFRAN) injection 4 mg, 4 mg, Intravenous, Q6H PRN, Augustus Fish MD    oxyCODONE (ROXICODONE) immediate release tablet 20 mg, 20 mg, Oral, Q4H PRN, Augustus Fish MD    pantoprazole (PROTONIX) EC tablet 40 mg, 40 mg, Oral, Daily, Augustus Fish MD    promethazine (PHENERGAN)  "tablet 12.5 mg, 12.5 mg, Oral, Q6H PRN, Augustus Fish MD    rifAMPin (RIFADIN) capsule 300 mg, 300 mg, Oral, Q12H, Augustus Fish MD    sennosides-docusate (PERICOLACE) 8.6-50 MG per tablet 1 tablet, 1 tablet, Oral, BID, Augustus Fish MD    sodium chloride 0.9 % bolus 250 mL, 250 mL, Intravenous, PRN, Augustus Fish MD    sodium chloride 0.9 % flush 10 mL, 10 mL, Intravenous, Q12H, Augustus Fish MD    sodium chloride 0.9 % flush 10 mL, 10 mL, Intravenous, PRN, Augustus Fish MD    tamsulosin (FLOMAX) 24 hr capsule 0.4 mg, 0.4 mg, Oral, Nightly, Augustus Fish MD    traZODone (DESYREL) tablet 100 mg, 100 mg, Oral, Nightly, Augustus Fish MD    Data:     Code Status:    There are no questions and answers to display.       Family History   Problem Relation Age of Onset    Colon cancer Neg Hx     Colon polyps Neg Hx        Social History     Socioeconomic History    Marital status: Single   Tobacco Use    Smoking status: Every Day     Current packs/day: 0.25     Types: Cigarettes    Smokeless tobacco: Never   Vaping Use    Vaping status: Some Days    Substances: Nicotine, Flavoring    Devices: Disposable, Pre-filled pod   Substance and Sexual Activity    Alcohol use: No    Drug use: Yes     Types: Methamphetamines    Sexual activity: Yes       Vitals:  Ht 160 cm (63\")   Wt 74.8 kg (164 lb 14.4 oz)   BMI 29.21 kg/m²   T 97.9 P 96 R 16 /64 Sp02 98% (room air)          I/O (24Hr):  No intake or output data in the 24 hours ending 07/12/24 1133    Labs and imaging:      No results found for this or any previous visit (from the past 12 hour(s)).                                  Physical Examination:        Physical Exam  Vitals and nursing note reviewed.   Constitutional:       Appearance: Normal appearance.   HENT:      Head: Normocephalic and atraumatic.      Right Ear: External ear normal.      Left Ear: External ear normal.      Nose: Nose normal. "      Mouth/Throat:      Mouth: Mucous membranes are dry.      Pharynx: Oropharynx is clear.   Eyes:      Extraocular Movements: Extraocular movements intact.      Conjunctiva/sclera: Conjunctivae normal.      Pupils: Pupils are equal, round, and reactive to light.   Cardiovascular:      Rate and Rhythm: Normal rate and regular rhythm.      Pulses: Normal pulses.      Heart sounds: Normal heart sounds.   Pulmonary:      Effort: Pulmonary effort is normal.      Breath sounds: Normal breath sounds.   Abdominal:      General: Bowel sounds are normal.      Palpations: Abdomen is soft.   Musculoskeletal:      Cervical back: Normal range of motion and neck supple.      Comments: Generalized weakness  BLE weakness   Skin:     General: Skin is warm and dry.   Neurological:      Mental Status: She is alert and oriented to person, place, and time.   Psychiatric:         Mood and Affect: Mood normal.         Behavior: Behavior normal.           Assessment:          * No active hospital problems. *    Past Medical History:   Diagnosis Date    Anxiety     Depressed     GERD (gastroesophageal reflux disease)     Hepatitis C         Plan:        T8-9 MRSA discitis/osteomyelitis  Polysubstance abuse  History of triscupid valve endocarditis  Hepatitis C  Multifactorial anemia  GERD  Anxiety  Depression    Continue current treatment. Monitor counts. Increase activity. Labs Monday. Continue pain control efforts - will continue to wean pain medication. Wound care per wound care team. Maintain patient safety. Aggressive therapies as tolerated. Continue IV antibiotics under direction of ID.       Electronically signed by KELLY Urena on 7/12/2024 at 11:33 CDT

## 2024-07-12 NOTE — PROGRESS NOTES
"INFECTIOUS DISEASES PROGRESS NOTE    Patient:  Jaqui Kate  YOB: 1990  MRN: [id number]   Admit date: 6/25/2024   Admitting Physician: Augustus Fish MD  Primary Care Physician: Vanesa Guevara APRN    Chief Complaint: MRSA infection      Interval History:  Patient offers no new complaints.    Allergies: No Known Allergies    Current Scheduled Medications:   DAPTOmycin, 8 mg/kg, Intravenous, Q24H  enoxaparin, 40 mg, Subcutaneous, Q24H  gabapentin, 300 mg, Oral, TID  lactobacillus acidophilus, 2 capsule, Oral, Daily  Lidocaine, 1 patch, Transdermal, Q24H  melatonin, 5 mg, Oral, Nightly  naproxen, 250 mg, Oral, Q8H  nicotine, 1 patch, Transdermal, Q24H  pantoprazole, 40 mg, Oral, Daily  rifAMPin, 300 mg, Oral, Q12H  senna-docusate sodium, 1 tablet, Oral, BID  sodium chloride, 10 mL, Intravenous, Q12H  tamsulosin, 0.4 mg, Oral, Nightly  traZODone, 100 mg, Oral, Nightly      Current PRN Medications:    acetaminophen **OR** acetaminophen    bisacodyl    cyclobenzaprine    diphenhydrAMINE    guaiFENesin    magnesium hydroxide    OLANZapine    ondansetron ODT **OR** ondansetron    oxyCODONE    promethazine    sodium chloride    sodium chloride            Objective     Vital Signs:  No data recorded.      Ht 160 cm (63\")   Wt 74.8 kg (164 lb 14.4 oz)   BMI 29.21 kg/m²     Temperature 97.9    heart rate 96    respiratory rate 16      blood pressure 102/64  Physical Exam:    Gen: non toxic appearing and NAD lying in bed   RUE PICC dressing C/D/I   PSYCH pleasant and cooperative      Lab Results:    CBC:   Lab Results   Lab 07/08/24  0546   WBC 4.93   HEMOGLOBIN 10.3*   HEMATOCRIT 33.6*   PLATELETS 323        AutoDiff:   Lab Results   Lab 07/08/24  0546   NEUTROPHIL % 42.8   LYMPHOCYTE % 38.1   MONOCYTES % 11.6   EOSINOPHIL % 6.3*   BASOPHIL % 0.8   NEUTROS ABS 2.11   LYMPHS ABS 1.88   MONOS ABS 0.57   EOS ABS 0.31   BASOS ABS 0.04        Manual Diff:    Lab Results   Lab 07/08/24  0546   NEUTROS " ABS 2.11           CMP:   Lab Results   Lab 07/08/24  0546   SODIUM 138   POTASSIUM 4.2   CHLORIDE 103   CO2 27.0   BUN 24*   CREATININE 0.51*   CALCIUM 9.1   BILIRUBIN <0.2   ALK PHOS 152*   ALT (SGPT) 21   AST (SGOT) 17   GLUCOSE 109*       Estimated Creatinine Clearance: 150.7 mL/min (A) (by C-G formula based on SCr of 0.51 mg/dL (L)).      C-Reactive Protein   Date Value Ref Range Status   07/08/2024 <0.30 0.00 - 0.50 mg/dL Final   07/03/2024 <0.30 0.00 - 0.50 mg/dL Final   07/01/2024 <0.30 0.00 - 0.50 mg/dL Final     Sed Rate   Date Value Ref Range Status   07/08/2024 28 (H) 0 - 20 mm/hr Final   07/04/2024 36 (H) 0 - 20 mm/hr Final   07/01/2024 50 (H) 0 - 20 mm/hr Final       Culture Results:    Microbiology Results (last 10 days)       ** No results found for the last 240 hours. **               CPK          7/8/2024    05:46   Common Labs   Creatine Kinase 20           Radiology:   Imaging Results (Last 72 Hours)       ** No results found for the last 72 hours. **                There are no active hospital problems to display for this patient.      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 in [place].    Recurrent MRSA bacteremia-patient had bacteremia in January 2024 (hospitalized at [facility] at that time), again in March 2024 and then again when presented back to [facility] in June.  Per [facility] records, blood cultures reported to have cleared Luna 10 .  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   These are oral options for patient for probable longterm  suppression after completes iv course.  Ordered Ck for Monday  Upon discharge will need referral for hepatitis C treatment        Debby Gastelum MD  07/12/24  14:25 CDT

## 2024-07-13 PROCEDURE — 25010000002 ENOXAPARIN PER 10 MG: Performed by: INTERNAL MEDICINE

## 2024-07-13 PROCEDURE — 25010000002 DAPTOMYCIN PER 1 MG: Performed by: INTERNAL MEDICINE

## 2024-07-13 PROCEDURE — 97116 GAIT TRAINING THERAPY: CPT

## 2024-07-13 NOTE — PROGRESS NOTES
RYAN Tate APRN        Internal Medicine Progress Note    7/13/2024   08:55 CDT    Name:  Jaqui Kate  MRN:    [id number]     Acct:     [id number]   Room:  [address] [place] [address] Day: 0     Admit Date: 6/25/2024  6:41 PM  PCP: Vanesa Guevara APRN    Subjective:     C/C: pain    Interval History: Status:  stayed the same. Resting in bed. Sleeping. Appears to be resting comfortably. Afebrile. No new concerns.     Review of Systems   Constitutional: Positive for malaise/fatigue. Negative for chills, decreased appetite, weight gain and weight loss.   HENT:  Negative for congestion, ear discharge, hoarse voice and tinnitus.    Eyes:  Negative for blurred vision, discharge, visual disturbance and visual halos.   Cardiovascular:  Negative for chest pain, claudication, dyspnea on exertion, irregular heartbeat, leg swelling, orthopnea and paroxysmal nocturnal dyspnea.   Respiratory:  Negative for cough, shortness of breath, sputum production and wheezing.    Endocrine: Negative for cold intolerance, heat intolerance and polyuria.   Hematologic/Lymphatic: Negative for adenopathy. Does not bruise/bleed easily.   Skin:  Negative for dry skin, itching and suspicious lesions.   Musculoskeletal:  Positive for joint pain, muscle weakness and myalgias. Negative for arthritis, back pain and falls.   Gastrointestinal:  Negative for abdominal pain, constipation, diarrhea, dysphagia and hematemesis.   Genitourinary:  Negative for bladder incontinence, dysuria and frequency.   Neurological:  Positive for focal weakness and weakness. Negative for aphonia, disturbances in coordination and dizziness.   Psychiatric/Behavioral:  Positive for substance abuse. Negative for altered mental status and depression. The patient is nervous/anxious. The patient does not have insomnia.          Medications:     Allergies: No Known Allergies    Current Meds:   Current Facility-Administered Medications:      acetaminophen (TYLENOL) tablet 650 mg, 650 mg, Oral, Q4H PRN **OR** acetaminophen (TYLENOL) suppository 650 mg, 650 mg, Rectal, Q4H PRN, Augustus Fish MD    bisacodyl (DULCOLAX) suppository 10 mg, 10 mg, Rectal, Daily PRN, Augustus Fish MD    cyclobenzaprine (FLEXERIL) tablet 10 mg, 10 mg, Oral, Q8H PRN, Qing Jimenez, APRN    DAPTOmycin (CUBICIN) 600 mg in sodium chloride 0.9 % 50 mL IVPB, 8 mg/kg, Intravenous, Q24H, Augustus Fish MD    diphenhydrAMINE (BENADRYL) capsule 25 mg, 25 mg, Oral, Q6H PRN, Augustus Fish MD    Enoxaparin Sodium (LOVENOX) syringe 40 mg, 40 mg, Subcutaneous, Q24H, Augustus Fish MD    gabapentin (NEURONTIN) capsule 300 mg, 300 mg, Oral, TID, Augustus Fish MD    guaiFENesin (MUCINEX) 12 hr tablet 1,200 mg, 1,200 mg, Oral, Q12H PRN, Augustus Fish MD    lactobacillus acidophilus (RISAQUAD) capsule 2 capsule, 2 capsule, Oral, Daily, Augustus Fish MD    Lidocaine 4 % 1 patch, 1 patch, Transdermal, Q24H, Augustus Fish MD    magnesium hydroxide (MILK OF MAGNESIA) 400 MG/5ML suspension 30 mL, 30 mL, Oral, BID PRN, Augustus Fish MD    melatonin tablet 5 mg, 5 mg, Oral, Nightly, Augustus Fish MD    naproxen (NAPROSYN) tablet 250 mg, 250 mg, Oral, Q8H, Qing Jimenez, APRVASU    nicotine (NICODERM CQ) 21 MG/24HR patch 1 patch, 1 patch, Transdermal, Q24H, Augustus Fish MD    OLANZapine (zyPREXA) tablet 5 mg, 5 mg, Oral, Q6H PRN, Augustus Fish MD    ondansetron ODT (ZOFRAN-ODT) disintegrating tablet 4 mg, 4 mg, Oral, Q6H PRN **OR** ondansetron (ZOFRAN) injection 4 mg, 4 mg, Intravenous, Q6H PRN, Augustus Fish MD    oxyCODONE (ROXICODONE) immediate release tablet 20 mg, 20 mg, Oral, Q4H PRN, Augustus Fish MD    pantoprazole (PROTONIX) EC tablet 40 mg, 40 mg, Oral, Daily, Augustus Fish MD    promethazine (PHENERGAN) tablet 12.5 mg, 12.5 mg, Oral, Q6H PRN,  "Augustus Fish MD    rifAMPin (RIFADIN) capsule 300 mg, 300 mg, Oral, Q12H, Augustus Fish MD    sennosides-docusate (PERICOLACE) 8.6-50 MG per tablet 1 tablet, 1 tablet, Oral, BID, Augustus Fish MD    sodium chloride 0.9 % bolus 250 mL, 250 mL, Intravenous, PRN, Augustus Fish MD    sodium chloride 0.9 % flush 10 mL, 10 mL, Intravenous, Q12H, Augustus Fish MD    sodium chloride 0.9 % flush 10 mL, 10 mL, Intravenous, PRN, Augustus Fish MD    tamsulosin (FLOMAX) 24 hr capsule 0.4 mg, 0.4 mg, Oral, Nightly, Augustus Fish MD    traZODone (DESYREL) tablet 100 mg, 100 mg, Oral, Nightly, Augustus Fish MD    Data:     Code Status:    There are no questions and answers to display.       Family History   Problem Relation Age of Onset    Colon cancer Neg Hx     Colon polyps Neg Hx        Social History     Socioeconomic History    Marital status: Single   Tobacco Use    Smoking status: Every Day     Current packs/day: 0.25     Types: Cigarettes    Smokeless tobacco: Never   Vaping Use    Vaping status: Some Days    Substances: Nicotine, Flavoring    Devices: Disposable, Pre-filled pod   Substance and Sexual Activity    Alcohol use: No    Drug use: Yes     Types: Methamphetamines    Sexual activity: Yes       Vitals:  Ht 160 cm (63\")   Wt 74.8 kg (164 lb 14.4 oz)   BMI 29.21 kg/m²   T 97.8 P 73 R 18 /66 Sp02 100% (room air)          I/O (24Hr):  No intake or output data in the 24 hours ending 07/13/24 0855    Labs and imaging:      No results found for this or any previous visit (from the past 12 hour(s)).                                  Physical Examination:        Physical Exam  Vitals and nursing note reviewed.   Constitutional:       Appearance: Normal appearance.   HENT:      Head: Normocephalic and atraumatic.      Right Ear: External ear normal.      Left Ear: External ear normal.      Nose: Nose normal.      Mouth/Throat:      Mouth: Mucous " membranes are dry.      Pharynx: Oropharynx is clear.   Eyes:      Extraocular Movements: Extraocular movements intact.      Conjunctiva/sclera: Conjunctivae normal.      Pupils: Pupils are equal, round, and reactive to light.   Cardiovascular:      Rate and Rhythm: Normal rate and regular rhythm.      Pulses: Normal pulses.      Heart sounds: Normal heart sounds.   Pulmonary:      Effort: Pulmonary effort is normal.      Breath sounds: Normal breath sounds.   Abdominal:      General: Bowel sounds are normal.      Palpations: Abdomen is soft.   Musculoskeletal:      Cervical back: Normal range of motion and neck supple.      Comments: Generalized weakness  BLE weakness   Skin:     General: Skin is warm and dry.   Neurological:      Mental Status: She is alert and oriented to person, place, and time.   Psychiatric:         Mood and Affect: Mood normal.         Behavior: Behavior normal.           Assessment:          * No active hospital problems. *    Past Medical History:   Diagnosis Date    Anxiety     Depressed     GERD (gastroesophageal reflux disease)     Hepatitis C         Plan:        T8-9 MRSA discitis/osteomyelitis  Polysubstance abuse  History of triscupid valve endocarditis  Hepatitis C  Multifactorial anemia  GERD  Anxiety  Depression    Continue current treatment. Monitor counts. Increase activity. Labs Monday. Continue pain control efforts - will continue to wean pain medication. Wound care per wound care team. Maintain patient safety. Aggressive therapies as tolerated. Continue IV antibiotics under direction of ID.       Electronically signed by KELLY Urena on 7/13/2024 at 08:55 CDT     I have discussed the care of Jaqui Kate, including pertinent history and exam findings, with the nurse practitioner.    I have seen and examined the patient and the key elements of all parts of the encounter have been performed by me.  I agree with the assessment, plan and orders as documented by Gaby  KELLY Child, after I modified the exam findings and the plan of treatments and the final version is my approved version of the assessment.        Electronically signed by Augustus Fish MD on 7/13/2024 at 15:22 CDT

## 2024-07-14 LAB — GLUCOSE BLDC GLUCOMTR-MCNC: 91 MG/DL (ref 70–130)

## 2024-07-14 PROCEDURE — 25010000002 DAPTOMYCIN PER 1 MG: Performed by: INTERNAL MEDICINE

## 2024-07-14 PROCEDURE — 82948 REAGENT STRIP/BLOOD GLUCOSE: CPT

## 2024-07-14 PROCEDURE — 25010000002 ENOXAPARIN PER 10 MG: Performed by: INTERNAL MEDICINE

## 2024-07-14 PROCEDURE — 97116 GAIT TRAINING THERAPY: CPT

## 2024-07-14 NOTE — PROGRESS NOTES
RYAN Tate APRN        Internal Medicine Progress Note    7/14/2024   09:07 CDT    Name:  Jaqui Kate  MRN:    [id number]     Acct:     [id number]   Room:  [facility] Day: 0     Admit Date: 6/25/2024  6:41 PM  PCP: Vanesa Guevara APRN    Subjective:     C/C: pain    Interval History: Status:  stayed the same. Resting in bed. No family at bedside. Pain controlled at present. No new concerns voiced.     Review of Systems   Constitutional: Positive for malaise/fatigue. Negative for chills, decreased appetite, weight gain and weight loss.   HENT:  Negative for congestion, ear discharge, hoarse voice and tinnitus.    Eyes:  Negative for blurred vision, discharge, visual disturbance and visual halos.   Cardiovascular:  Negative for chest pain, claudication, dyspnea on exertion, irregular heartbeat, leg swelling, orthopnea and paroxysmal nocturnal dyspnea.   Respiratory:  Negative for cough, shortness of breath, sputum production and wheezing.    Endocrine: Negative for cold intolerance, heat intolerance and polyuria.   Hematologic/Lymphatic: Negative for adenopathy. Does not bruise/bleed easily.   Skin:  Negative for dry skin, itching and suspicious lesions.   Musculoskeletal:  Positive for joint pain, muscle weakness and myalgias. Negative for arthritis, back pain and falls.   Gastrointestinal:  Negative for abdominal pain, constipation, diarrhea, dysphagia and hematemesis.   Genitourinary:  Negative for bladder incontinence, dysuria and frequency.   Neurological:  Positive for focal weakness and weakness. Negative for aphonia, disturbances in coordination and dizziness.   Psychiatric/Behavioral:  Positive for substance abuse. Negative for altered mental status and depression. The patient is nervous/anxious. The patient does not have insomnia.          Medications:     Allergies: No Known Allergies    Current Meds:   Current Facility-Administered Medications:      acetaminophen (TYLENOL) tablet 650 mg, 650 mg, Oral, Q4H PRN **OR** acetaminophen (TYLENOL) suppository 650 mg, 650 mg, Rectal, Q4H PRN, Augustus Fish MD    bisacodyl (DULCOLAX) suppository 10 mg, 10 mg, Rectal, Daily PRN, Augustus Fish MD    cyclobenzaprine (FLEXERIL) tablet 10 mg, 10 mg, Oral, Q8H PRN, Qing Jimenez, APRN    DAPTOmycin (CUBICIN) 600 mg in sodium chloride 0.9 % 50 mL IVPB, 8 mg/kg, Intravenous, Q24H, Augustus Fish MD    diphenhydrAMINE (BENADRYL) capsule 25 mg, 25 mg, Oral, Q6H PRN, Augustus Fish MD    Enoxaparin Sodium (LOVENOX) syringe 40 mg, 40 mg, Subcutaneous, Q24H, Augustus Fish MD    gabapentin (NEURONTIN) capsule 300 mg, 300 mg, Oral, TID, Augustus Fish MD    guaiFENesin (MUCINEX) 12 hr tablet 1,200 mg, 1,200 mg, Oral, Q12H PRN, Augustus Fish MD    lactobacillus acidophilus (RISAQUAD) capsule 2 capsule, 2 capsule, Oral, Daily, Augustus Fish MD    Lidocaine 4 % 1 patch, 1 patch, Transdermal, Q24H, Augustus Fish MD    magnesium hydroxide (MILK OF MAGNESIA) 400 MG/5ML suspension 30 mL, 30 mL, Oral, BID PRN, Augustus Fish MD    melatonin tablet 5 mg, 5 mg, Oral, Nightly, Augustus Fish MD    naproxen (NAPROSYN) tablet 250 mg, 250 mg, Oral, Q8H, Qing Jimenez, APRVASU    nicotine (NICODERM CQ) 21 MG/24HR patch 1 patch, 1 patch, Transdermal, Q24H, Augustus Fish MD    OLANZapine (zyPREXA) tablet 5 mg, 5 mg, Oral, Q6H PRN, Augustus Fish MD    ondansetron ODT (ZOFRAN-ODT) disintegrating tablet 4 mg, 4 mg, Oral, Q6H PRN **OR** ondansetron (ZOFRAN) injection 4 mg, 4 mg, Intravenous, Q6H PRN, Augustus Fish MD    oxyCODONE (ROXICODONE) immediate release tablet 20 mg, 20 mg, Oral, Q4H PRN, Augustus Fish MD    pantoprazole (PROTONIX) EC tablet 40 mg, 40 mg, Oral, Daily, Augustus Fish MD    promethazine (PHENERGAN) tablet 12.5 mg, 12.5 mg, Oral, Q6H PRN,  "Augustus Fish MD    rifAMPin (RIFADIN) capsule 300 mg, 300 mg, Oral, Q12H, Augustus Fish MD    sennosides-docusate (PERICOLACE) 8.6-50 MG per tablet 1 tablet, 1 tablet, Oral, BID, Augustus Fish MD    sodium chloride 0.9 % bolus 250 mL, 250 mL, Intravenous, PRN, Augustus Fish MD    sodium chloride 0.9 % flush 10 mL, 10 mL, Intravenous, Q12H, Augustus Fish MD    sodium chloride 0.9 % flush 10 mL, 10 mL, Intravenous, PRN, Augustus Fish MD    tamsulosin (FLOMAX) 24 hr capsule 0.4 mg, 0.4 mg, Oral, Nightly, Augustus Fish MD    traZODone (DESYREL) tablet 100 mg, 100 mg, Oral, Nightly, Augustus Fish MD    Data:     Code Status:    There are no questions and answers to display.       Family History   Problem Relation Age of Onset    Colon cancer Neg Hx     Colon polyps Neg Hx        Social History     Socioeconomic History    Marital status: Single   Tobacco Use    Smoking status: Every Day     Current packs/day: 0.25     Types: Cigarettes    Smokeless tobacco: Never   Vaping Use    Vaping status: Some Days    Substances: Nicotine, Flavoring    Devices: Disposable, Pre-filled pod   Substance and Sexual Activity    Alcohol use: No    Drug use: Yes     Types: Methamphetamines    Sexual activity: Yes       Vitals:  Ht 160 cm (63\")   Wt 74.8 kg (164 lb 14.4 oz)   BMI 29.21 kg/m²   T 97.9 P 89 R 16 /75 Sp02 100% (room air)          I/O (24Hr):  No intake or output data in the 24 hours ending 07/14/24 0907    Labs and imaging:      No results found for this or any previous visit (from the past 12 hour(s)).                                  Physical Examination:        Physical Exam  Vitals and nursing note reviewed.   Constitutional:       Appearance: Normal appearance.   HENT:      Head: Normocephalic and atraumatic.      Right Ear: External ear normal.      Left Ear: External ear normal.      Nose: Nose normal.      Mouth/Throat:      Mouth: Mucous " membranes are dry.      Pharynx: Oropharynx is clear.   Eyes:      Extraocular Movements: Extraocular movements intact.      Conjunctiva/sclera: Conjunctivae normal.      Pupils: Pupils are equal, round, and reactive to light.   Cardiovascular:      Rate and Rhythm: Normal rate and regular rhythm.      Pulses: Normal pulses.      Heart sounds: Normal heart sounds.   Pulmonary:      Effort: Pulmonary effort is normal.      Breath sounds: Normal breath sounds.   Abdominal:      General: Bowel sounds are normal.      Palpations: Abdomen is soft.   Musculoskeletal:      Cervical back: Normal range of motion and neck supple.      Comments: Generalized weakness  BLE weakness   Skin:     General: Skin is warm and dry.   Neurological:      Mental Status: She is alert and oriented to person, place, and time.   Psychiatric:         Mood and Affect: Mood normal.         Behavior: Behavior normal.           Assessment:          * No active hospital problems. *    Past Medical History:   Diagnosis Date    Anxiety     Depressed     GERD (gastroesophageal reflux disease)     Hepatitis C         Plan:        T8-9 MRSA discitis/osteomyelitis  Polysubstance abuse  History of triscupid valve endocarditis  Hepatitis C  Multifactorial anemia  GERD  Anxiety  Depression    Continue current treatment. Monitor counts. Increase activity. Labs Monday. Continue pain control efforts - will continue to wean pain medication. Wound care per wound care team. Maintain patient safety. Aggressive therapies as tolerated. Continue IV antibiotics under direction of ID.       Electronically signed by KELLY Urena on 7/14/2024 at 09:07 CDT

## 2024-07-15 VITALS — BODY MASS INDEX: 31.71 KG/M2 | HEIGHT: 63 IN | WEIGHT: 179 LBS

## 2024-07-15 LAB
ANION GAP SERPL CALCULATED.3IONS-SCNC: 6 MMOL/L (ref 5–15)
BUN SERPL-MCNC: 19 MG/DL (ref 6–20)
BUN/CREAT SERPL: 34.5 (ref 7–25)
CALCIUM SPEC-SCNC: 8.9 MG/DL (ref 8.6–10.5)
CHLORIDE SERPL-SCNC: 107 MMOL/L (ref 98–107)
CK SERPL-CCNC: 23 U/L (ref 20–180)
CO2 SERPL-SCNC: 25 MMOL/L (ref 22–29)
CREAT SERPL-MCNC: 0.55 MG/DL (ref 0.57–1)
CRP SERPL-MCNC: <0.3 MG/DL (ref 0–0.5)
DEPRECATED RDW RBC AUTO: 52.4 FL (ref 37–54)
EGFRCR SERPLBLD CKD-EPI 2021: 123.5 ML/MIN/1.73
ERYTHROCYTE [DISTWIDTH] IN BLOOD BY AUTOMATED COUNT: 16.3 % (ref 12.3–15.4)
ERYTHROCYTE [SEDIMENTATION RATE] IN BLOOD: 39 MM/HR (ref 0–20)
GLUCOSE SERPL-MCNC: 94 MG/DL (ref 65–99)
HCT VFR BLD AUTO: 32.7 % (ref 34–46.6)
HGB BLD-MCNC: 10.1 G/DL (ref 12–15.9)
MCH RBC QN AUTO: 26.9 PG (ref 26.6–33)
MCHC RBC AUTO-ENTMCNC: 30.9 G/DL (ref 31.5–35.7)
MCV RBC AUTO: 87 FL (ref 79–97)
PLATELET # BLD AUTO: 289 10*3/MM3 (ref 140–450)
PMV BLD AUTO: 8.9 FL (ref 6–12)
POTASSIUM SERPL-SCNC: 4.4 MMOL/L (ref 3.5–5.2)
RBC # BLD AUTO: 3.76 10*6/MM3 (ref 3.77–5.28)
SODIUM SERPL-SCNC: 138 MMOL/L (ref 136–145)
WBC NRBC COR # BLD AUTO: 5.04 10*3/MM3 (ref 3.4–10.8)

## 2024-07-15 PROCEDURE — 97530 THERAPEUTIC ACTIVITIES: CPT

## 2024-07-15 PROCEDURE — 99231 SBSQ HOSP IP/OBS SF/LOW 25: CPT | Performed by: INTERNAL MEDICINE

## 2024-07-15 PROCEDURE — 97116 GAIT TRAINING THERAPY: CPT

## 2024-07-15 PROCEDURE — 82550 ASSAY OF CK (CPK): CPT | Performed by: INTERNAL MEDICINE

## 2024-07-15 PROCEDURE — 25010000002 ENOXAPARIN PER 10 MG: Performed by: INTERNAL MEDICINE

## 2024-07-15 PROCEDURE — 97535 SELF CARE MNGMENT TRAINING: CPT

## 2024-07-15 PROCEDURE — 80048 BASIC METABOLIC PNL TOTAL CA: CPT | Performed by: INTERNAL MEDICINE

## 2024-07-15 PROCEDURE — 85027 COMPLETE CBC AUTOMATED: CPT | Performed by: INTERNAL MEDICINE

## 2024-07-15 PROCEDURE — 86140 C-REACTIVE PROTEIN: CPT | Performed by: INTERNAL MEDICINE

## 2024-07-15 PROCEDURE — 85652 RBC SED RATE AUTOMATED: CPT | Performed by: INTERNAL MEDICINE

## 2024-07-15 PROCEDURE — 63710000001 ONDANSETRON ODT 4 MG TABLET DISPERSIBLE: Performed by: INTERNAL MEDICINE

## 2024-07-15 PROCEDURE — 25010000002 DAPTOMYCIN PER 1 MG: Performed by: INTERNAL MEDICINE

## 2024-07-15 NOTE — PROGRESS NOTES
"INFECTIOUS DISEASES PROGRESS NOTE    Patient:  Jaqui Kate  YOB: 1990  MRN: [id number]   Admit date: 6/25/2024   Admitting Physician: Augustus Fish MD  Primary Care Physician: Vanesa Guevara APRN    Chief Complaint: MRSA      Interval History: Patient indicates she is doing well.  She is ambulating.  She is anxious to be able to discharge the day of her last dose of antibiotic therapy.    Allergies: No Known Allergies    Current Scheduled Medications:   DAPTOmycin, 8 mg/kg, Intravenous, Q24H  enoxaparin, 40 mg, Subcutaneous, Q24H  gabapentin, 300 mg, Oral, TID  lactobacillus acidophilus, 2 capsule, Oral, Daily  Lidocaine, 1 patch, Transdermal, Q24H  melatonin, 5 mg, Oral, Nightly  naproxen, 250 mg, Oral, Q8H  pantoprazole, 40 mg, Oral, Daily  rifAMPin, 300 mg, Oral, Q12H  senna-docusate sodium, 1 tablet, Oral, BID  sodium chloride, 10 mL, Intravenous, Q12H  tamsulosin, 0.4 mg, Oral, Nightly  traZODone, 100 mg, Oral, Nightly      Current PRN Medications:    acetaminophen **OR** acetaminophen    bisacodyl    cyclobenzaprine    diphenhydrAMINE    guaiFENesin    magnesium hydroxide    OLANZapine    ondansetron ODT **OR** ondansetron    oxyCODONE    promethazine    sodium chloride    sodium chloride            Objective     Vital Signs:  No data recorded.      Ht 160 cm (63\")   Wt 81.2 kg (179 lb)   BMI 31.71 kg/m²     Temperature 98.3    heart rate 101    respiratory rate 21      blood pressure 103/67    Physical Exam:    General: The patient is nontoxic-appearing, dressed and sitting up at bedside  Right upper extremity PICC line dressing clean dry and intact    Lab Results:    CBC:   Lab Results   Lab 07/15/24  0540   WBC 5.04   HEMOGLOBIN 10.1*   HEMATOCRIT 32.7*   PLATELETS 289        AutoDiff:            Manual Diff:          Invalid input(s): \"MONABS\"          CMP:   Lab Results   Lab 07/15/24  0540   SODIUM 138   POTASSIUM 4.4   CHLORIDE 107   CO2 25.0   BUN 19   CREATININE 0.55* "   CALCIUM 8.9   GLUCOSE 94       Estimated Creatinine Clearance: 145.4 mL/min (A) (by C-G formula based on SCr of 0.55 mg/dL (L)).      C-Reactive Protein   Date Value Ref Range Status   07/15/2024 <0.30 0.00 - 0.50 mg/dL Final   07/08/2024 <0.30 0.00 - 0.50 mg/dL Final   07/03/2024 <0.30 0.00 - 0.50 mg/dL Final     Sed Rate   Date Value Ref Range Status   07/15/2024 39 (H) 0 - 20 mm/hr Final   07/08/2024 28 (H) 0 - 20 mm/hr Final   07/04/2024 36 (H) 0 - 20 mm/hr Final       Culture Results:    Microbiology Results (last 10 days)       ** No results found for the last 240 hours. **               CPK          7/15/2024    05:40   Common Labs   Creatine Kinase 23           Radiology:   Imaging Results (Last 72 Hours)       ** No results found for the last 72 hours. **                There are no active hospital problems to display for this patient.      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    Recurrent MRSA bacteremia-patient had bacteremia in January 2024 (hospitalized at [facility] at that time), again in March 2024 and then again when presented back to [facility] in June, 2024.  Per [facility] records, blood cultures reported to have cleared Luna 10 .  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 regarding backing up daptomycin dose a little each day so that perhaps she can have an earlier dose on June 20 in preparation for discharge  Continue oral rifampin-current plan is through July 20    Per records from [place], patient MRSA was susceptible to Bactrim, clindamycin and tetracycline.   These  are oral options for patient for probable longterm suppression after completes iv course.  Upon discharge will need referral to GI for hepatitis C treatment        Debby Gastelum MD  07/15/24  12:19 CDT

## 2024-07-15 NOTE — PROGRESS NOTES
Outpatient Medications Marked as Taking for the 1/13/22 encounter (Telephone) with REED Vaughn   Medication Sig Dispense Refill   • escitalopram (LEXAPRO) 20 MG tablet Take 1 tablet by mouth daily. For anxiety 90 tablet 0        clarification request  Fax in nurse mailbox Lydia Reeves   RYAN Tate APRN        Internal Medicine Progress Note    7/15/2024   11:11 CDT    Name:  Jaqui Kate  MRN:    [id number]     Acct:     [id number]   Room:  [facility] Day: 0     Admit Date: 6/25/2024  6:41 PM  PCP: Vanesa Guevara APRN    Subjective:     C/C: pain    Interval History: Status:  stayed the same. Resting in bed. No family at bedside. Afebrile. Pain well controlled at present time. Maintaining adequate 02 sats on room air. Progressing with therapies. Counts stable.     Review of Systems   Constitutional: Positive for malaise/fatigue. Negative for chills, decreased appetite, weight gain and weight loss.   HENT:  Negative for congestion, ear discharge, hoarse voice and tinnitus.    Eyes:  Negative for blurred vision, discharge, visual disturbance and visual halos.   Cardiovascular:  Negative for chest pain, claudication, dyspnea on exertion, irregular heartbeat, leg swelling, orthopnea and paroxysmal nocturnal dyspnea.   Respiratory:  Negative for cough, shortness of breath, sputum production and wheezing.    Endocrine: Negative for cold intolerance, heat intolerance and polyuria.   Hematologic/Lymphatic: Negative for adenopathy. Does not bruise/bleed easily.   Skin:  Negative for dry skin, itching and suspicious lesions.   Musculoskeletal:  Positive for joint pain, muscle weakness and myalgias. Negative for arthritis, back pain and falls.   Gastrointestinal:  Negative for abdominal pain, constipation, diarrhea, dysphagia and hematemesis.   Genitourinary:  Negative for bladder incontinence, dysuria and frequency.   Neurological:  Positive for focal weakness and weakness. Negative for aphonia, disturbances in coordination and dizziness.   Psychiatric/Behavioral:  Positive for substance abuse. Negative for altered mental status and depression. The patient is nervous/anxious. The patient does not have insomnia.          Medications:     Allergies: No Known  Allergies    Current Meds:   Current Facility-Administered Medications:     acetaminophen (TYLENOL) tablet 650 mg, 650 mg, Oral, Q4H PRN **OR** acetaminophen (TYLENOL) suppository 650 mg, 650 mg, Rectal, Q4H PRN, Augustus Fish MD    bisacodyl (DULCOLAX) suppository 10 mg, 10 mg, Rectal, Daily PRN, Augustus Fish MD    cyclobenzaprine (FLEXERIL) tablet 10 mg, 10 mg, Oral, Q8H PRN, Qing Jimenez APRN    DAPTOmycin (CUBICIN) 600 mg in sodium chloride 0.9 % 50 mL IVPB, 8 mg/kg, Intravenous, Q24H, Augustus Fish MD    diphenhydrAMINE (BENADRYL) capsule 25 mg, 25 mg, Oral, Q6H PRN, Augustus Fish MD    Enoxaparin Sodium (LOVENOX) syringe 40 mg, 40 mg, Subcutaneous, Q24H, Augustus Fish MD    gabapentin (NEURONTIN) capsule 300 mg, 300 mg, Oral, TID, Augustus Fish MD    guaiFENesin (MUCINEX) 12 hr tablet 1,200 mg, 1,200 mg, Oral, Q12H PRN, Augustus Fish MD    lactobacillus acidophilus (RISAQUAD) capsule 2 capsule, 2 capsule, Oral, Daily, Augustus Fish MD    Lidocaine 4 % 1 patch, 1 patch, Transdermal, Q24H, Augustus Fish MD    magnesium hydroxide (MILK OF MAGNESIA) 400 MG/5ML suspension 30 mL, 30 mL, Oral, BID PRN, Augustus Fish MD    melatonin tablet 5 mg, 5 mg, Oral, Nightly, Augustus Fish MD    naproxen (NAPROSYN) tablet 250 mg, 250 mg, Oral, Q8H, Qing Jimenez APRN    OLANZapine (zyPREXA) tablet 5 mg, 5 mg, Oral, Q6H PRN, Augustus Fish MD    ondansetron ODT (ZOFRAN-ODT) disintegrating tablet 4 mg, 4 mg, Oral, Q6H PRN **OR** ondansetron (ZOFRAN) injection 4 mg, 4 mg, Intravenous, Q6H PRN, Augustus Fish MD    oxyCODONE (ROXICODONE) immediate release tablet 20 mg, 20 mg, Oral, Q4H PRN, Augustus Fish MD    pantoprazole (PROTONIX) EC tablet 40 mg, 40 mg, Oral, Daily, Augustus Fish MD    promethazine (PHENERGAN) tablet 12.5 mg, 12.5 mg, Oral, Q6H PRN, Augustus Fish MD     "rifAMPin (RIFADIN) capsule 300 mg, 300 mg, Oral, Q12H, Augustus Fish MD    sennosides-docusate (PERICOLACE) 8.6-50 MG per tablet 1 tablet, 1 tablet, Oral, BID, Augustus Fish MD    sodium chloride 0.9 % bolus 250 mL, 250 mL, Intravenous, PRN, Augustus Fish MD    sodium chloride 0.9 % flush 10 mL, 10 mL, Intravenous, Q12H, Augustus Fish MD    sodium chloride 0.9 % flush 10 mL, 10 mL, Intravenous, PRN, Augustus Fish MD    tamsulosin (FLOMAX) 24 hr capsule 0.4 mg, 0.4 mg, Oral, Nightly, Augustus Fish MD    traZODone (DESYREL) tablet 100 mg, 100 mg, Oral, Nightly, Augustus Fish MD    Data:     Code Status:    There are no questions and answers to display.       Family History   Problem Relation Age of Onset    Colon cancer Neg Hx     Colon polyps Neg Hx        Social History     Socioeconomic History    Marital status: Single   Tobacco Use    Smoking status: Every Day     Current packs/day: 0.25     Types: Cigarettes    Smokeless tobacco: Never   Vaping Use    Vaping status: Some Days    Substances: Nicotine, Flavoring    Devices: Disposable, Pre-filled pod   Substance and Sexual Activity    Alcohol use: No    Drug use: Yes     Types: Methamphetamines    Sexual activity: Yes       Vitals:  Ht 160 cm (63\")   Wt 81.2 kg (179 lb)   BMI 31.71 kg/m²   T 98.3 P 101 R 21 /67 Sp02 98% (room air)          I/O (24Hr):  No intake or output data in the 24 hours ending 07/15/24 1111    Labs and imaging:      Recent Results (from the past 12 hour(s))   CK    Collection Time: 07/15/24  5:40 AM    Specimen: Blood   Result Value Ref Range    Creatine Kinase 23 20 - 180 U/L   CBC (No Diff)    Collection Time: 07/15/24  5:40 AM    Specimen: Blood   Result Value Ref Range    WBC 5.04 3.40 - 10.80 10*3/mm3    RBC 3.76 (L) 3.77 - 5.28 10*6/mm3    Hemoglobin 10.1 (L) 12.0 - 15.9 g/dL    Hematocrit 32.7 (L) 34.0 - 46.6 %    MCV 87.0 79.0 - 97.0 fL    MCH 26.9 26.6 - 33.0 pg "    MCHC 30.9 (L) 31.5 - 35.7 g/dL    RDW 16.3 (H) 12.3 - 15.4 %    RDW-SD 52.4 37.0 - 54.0 fl    MPV 8.9 6.0 - 12.0 fL    Platelets 289 140 - 450 10*3/mm3   Basic Metabolic Panel    Collection Time: 07/15/24  5:40 AM    Specimen: Blood   Result Value Ref Range    Glucose 94 65 - 99 mg/dL    BUN 19 6 - 20 mg/dL    Creatinine 0.55 (L) 0.57 - 1.00 mg/dL    Sodium 138 136 - 145 mmol/L    Potassium 4.4 3.5 - 5.2 mmol/L    Chloride 107 98 - 107 mmol/L    CO2 25.0 22.0 - 29.0 mmol/L    Calcium 8.9 8.6 - 10.5 mg/dL    BUN/Creatinine Ratio 34.5 (H) 7.0 - 25.0    Anion Gap 6.0 5.0 - 15.0 mmol/L    eGFR 123.5 >60.0 mL/min/1.73   Sedimentation Rate    Collection Time: 07/15/24  5:40 AM    Specimen: Blood   Result Value Ref Range    Sed Rate 39 (H) 0 - 20 mm/hr   C-reactive Protein    Collection Time: 07/15/24  5:40 AM    Specimen: Blood   Result Value Ref Range    C-Reactive Protein <0.30 0.00 - 0.50 mg/dL                                     Physical Examination:        Physical Exam  Vitals and nursing note reviewed.   Constitutional:       Appearance: Normal appearance.   HENT:      Head: Normocephalic and atraumatic.      Right Ear: External ear normal.      Left Ear: External ear normal.      Nose: Nose normal.      Mouth/Throat:      Mouth: Mucous membranes are dry.      Pharynx: Oropharynx is clear.   Eyes:      Extraocular Movements: Extraocular movements intact.      Conjunctiva/sclera: Conjunctivae normal.      Pupils: Pupils are equal, round, and reactive to light.   Cardiovascular:      Rate and Rhythm: Normal rate and regular rhythm.      Pulses: Normal pulses.      Heart sounds: Normal heart sounds.   Pulmonary:      Effort: Pulmonary effort is normal.      Breath sounds: Normal breath sounds.   Abdominal:      General: Bowel sounds are normal.      Palpations: Abdomen is soft.   Musculoskeletal:      Cervical back: Normal range of motion and neck supple.      Comments: Generalized weakness  BLE weakness   Skin:      General: Skin is warm and dry.   Neurological:      Mental Status: She is alert and oriented to person, place, and time.   Psychiatric:         Mood and Affect: Mood normal.         Behavior: Behavior normal.           Assessment:          * No active hospital problems. *    Past Medical History:   Diagnosis Date    Anxiety     Depressed     GERD (gastroesophageal reflux disease)     Hepatitis C         Plan:        T8-9 MRSA discitis/osteomyelitis  Polysubstance abuse  History of triscupid valve endocarditis  Hepatitis C  Multifactorial anemia  GERD  Anxiety  Depression    Continue current treatment. Monitor counts. Increase activity. Labs Thursday. Continue pain control efforts - will continue to wean pain medication. Wound care per wound care team. Maintain patient safety. Aggressive therapies as tolerated. Continue IV antibiotics under direction of ID.       Electronically signed by KELLY Jones on 7/15/2024 at 11:11 CDT

## 2024-07-16 PROCEDURE — 97535 SELF CARE MNGMENT TRAINING: CPT

## 2024-07-16 PROCEDURE — 97530 THERAPEUTIC ACTIVITIES: CPT

## 2024-07-16 PROCEDURE — 25010000002 ENOXAPARIN PER 10 MG: Performed by: INTERNAL MEDICINE

## 2024-07-16 PROCEDURE — 25010000002 DAPTOMYCIN PER 1 MG: Performed by: INTERNAL MEDICINE

## 2024-07-16 PROCEDURE — 97116 GAIT TRAINING THERAPY: CPT

## 2024-07-16 RX ORDER — OXYCODONE HYDROCHLORIDE 5 MG/1
20 TABLET ORAL EVERY 4 HOURS PRN
Status: DISCONTINUED | OUTPATIENT
Start: 2024-07-16 | End: 2024-07-17

## 2024-07-16 NOTE — PROGRESS NOTES
RYAN Tate APRN        Internal Medicine Progress Note    7/16/2024   13:14 CDT    Name:  Jaqui Kate  MRN:    [id number]     Acct:     [id number]   Room:  [facility]/1   Day: 0     Admit Date: 6/25/2024  6:41 PM  PCP: Vanesa Guevara APRN    Subjective:     C/C: pain    Interval History: Status:  stayed the same. Up to side of bed.  No family at bedside. Afebrile. Pain well controlled at present time. Maintaining adequate 02 sats on room air. Progressing with therapies. Anxious for discharge plan.    Review of Systems   Constitutional: Positive for malaise/fatigue. Negative for chills, decreased appetite, weight gain and weight loss.   HENT:  Negative for congestion, ear discharge, hoarse voice and tinnitus.    Eyes:  Negative for blurred vision, discharge, visual disturbance and visual halos.   Cardiovascular:  Negative for chest pain, claudication, dyspnea on exertion, irregular heartbeat, leg swelling, orthopnea and paroxysmal nocturnal dyspnea.   Respiratory:  Negative for cough, shortness of breath, sputum production and wheezing.    Endocrine: Negative for cold intolerance, heat intolerance and polyuria.   Hematologic/Lymphatic: Negative for adenopathy. Does not bruise/bleed easily.   Skin:  Negative for dry skin, itching and suspicious lesions.   Musculoskeletal:  Positive for joint pain, muscle weakness and myalgias. Negative for arthritis, back pain and falls.   Gastrointestinal:  Negative for abdominal pain, constipation, diarrhea, dysphagia and hematemesis.   Genitourinary:  Negative for bladder incontinence, dysuria and frequency.   Neurological:  Positive for focal weakness and weakness. Negative for aphonia, disturbances in coordination and dizziness.   Psychiatric/Behavioral:  Positive for substance abuse. Negative for altered mental status and depression. The patient is nervous/anxious. The patient does not have insomnia.          Medications:      Allergies: No Known Allergies    Current Meds:   Current Facility-Administered Medications:     acetaminophen (TYLENOL) tablet 650 mg, 650 mg, Oral, Q4H PRN **OR** acetaminophen (TYLENOL) suppository 650 mg, 650 mg, Rectal, Q4H PRN, Augustus Fish MD    bisacodyl (DULCOLAX) suppository 10 mg, 10 mg, Rectal, Daily PRN, Augustus Fish MD    cyclobenzaprine (FLEXERIL) tablet 10 mg, 10 mg, Oral, Q8H PRN, Qing Jimenez APRN    DAPTOmycin (CUBICIN) 600 mg in sodium chloride 0.9 % 50 mL IVPB, 8 mg/kg, Intravenous, Q24H, Augustus Fish MD    diphenhydrAMINE (BENADRYL) capsule 25 mg, 25 mg, Oral, Q6H PRN, Augustus Fish MD    Enoxaparin Sodium (LOVENOX) syringe 40 mg, 40 mg, Subcutaneous, Q24H, Augustus Fish MD    gabapentin (NEURONTIN) capsule 300 mg, 300 mg, Oral, TID, Augustus Fish MD    guaiFENesin (MUCINEX) 12 hr tablet 1,200 mg, 1,200 mg, Oral, Q12H PRN, Augustus Fish MD    lactobacillus acidophilus (RISAQUAD) capsule 2 capsule, 2 capsule, Oral, Daily, Augustus Fish MD    Lidocaine 4 % 1 patch, 1 patch, Transdermal, Q24H, Augustus Fish MD    magnesium hydroxide (MILK OF MAGNESIA) 400 MG/5ML suspension 30 mL, 30 mL, Oral, BID PRN, Augustus Fish MD    melatonin tablet 5 mg, 5 mg, Oral, Nightly, Augustus Fish MD    naproxen (NAPROSYN) tablet 250 mg, 250 mg, Oral, Q8H, Qing Jimenez APRN    OLANZapine (zyPREXA) tablet 5 mg, 5 mg, Oral, Q6H PRN, Augustus Fish MD    ondansetron ODT (ZOFRAN-ODT) disintegrating tablet 4 mg, 4 mg, Oral, Q6H PRN **OR** ondansetron (ZOFRAN) injection 4 mg, 4 mg, Intravenous, Q6H PRN, Augustus Fish MD    oxyCODONE (ROXICODONE) immediate release tablet 20 mg, 20 mg, Oral, Q4H PRN, Augustus Fish MD    pantoprazole (PROTONIX) EC tablet 40 mg, 40 mg, Oral, Daily, Augustus Fish MD    promethazine (PHENERGAN) tablet 12.5 mg, 12.5 mg, Oral, Q6H PRN,  "Augustus Fish MD    rifAMPin (RIFADIN) capsule 300 mg, 300 mg, Oral, Q12H, Augustus Fish MD    sennosides-docusate (PERICOLACE) 8.6-50 MG per tablet 1 tablet, 1 tablet, Oral, BID, Augustus Fish MD    sodium chloride 0.9 % bolus 250 mL, 250 mL, Intravenous, PRN, Augustus Fish MD    sodium chloride 0.9 % flush 10 mL, 10 mL, Intravenous, Q12H, Augustus Fish MD    sodium chloride 0.9 % flush 10 mL, 10 mL, Intravenous, PRN, Augustus Fish MD    tamsulosin (FLOMAX) 24 hr capsule 0.4 mg, 0.4 mg, Oral, Nightly, Augustus Fish MD    traZODone (DESYREL) tablet 100 mg, 100 mg, Oral, Nightly, Augustus Fish MD    Data:     Code Status:    There are no questions and answers to display.       Family History   Problem Relation Age of Onset    Colon cancer Neg Hx     Colon polyps Neg Hx        Social History     Socioeconomic History    Marital status: Single   Tobacco Use    Smoking status: Every Day     Current packs/day: 0.25     Types: Cigarettes    Smokeless tobacco: Never   Vaping Use    Vaping status: Some Days    Substances: Nicotine, Flavoring    Devices: Disposable, Pre-filled pod   Substance and Sexual Activity    Alcohol use: No    Drug use: Yes     Types: Methamphetamines    Sexual activity: Yes       Vitals:  Ht 160 cm (63\")   Wt 81.2 kg (179 lb)   BMI 31.71 kg/m²   T 97.9 P 73 R 16 /57 Sp02 100% (room air)          I/O (24Hr):  No intake or output data in the 24 hours ending 07/16/24 1314    Labs and imaging:      No results found for this or any previous visit (from the past 12 hour(s)).                                    Physical Examination:        Physical Exam  Vitals and nursing note reviewed.   Constitutional:       Appearance: Normal appearance.   HENT:      Head: Normocephalic and atraumatic.      Right Ear: External ear normal.      Left Ear: External ear normal.      Nose: Nose normal.      Mouth/Throat:      Mouth: Mucous " membranes are dry.      Pharynx: Oropharynx is clear.   Eyes:      Extraocular Movements: Extraocular movements intact.      Conjunctiva/sclera: Conjunctivae normal.      Pupils: Pupils are equal, round, and reactive to light.   Cardiovascular:      Rate and Rhythm: Normal rate and regular rhythm.      Pulses: Normal pulses.      Heart sounds: Normal heart sounds.   Pulmonary:      Effort: Pulmonary effort is normal.      Breath sounds: Normal breath sounds.   Abdominal:      General: Bowel sounds are normal.      Palpations: Abdomen is soft.   Musculoskeletal:      Cervical back: Normal range of motion and neck supple.      Comments: Generalized weakness  BLE weakness   Skin:     General: Skin is warm and dry.   Neurological:      Mental Status: She is alert and oriented to person, place, and time.   Psychiatric:         Mood and Affect: Mood normal.         Behavior: Behavior normal.           Assessment:          * No active hospital problems. *    Past Medical History:   Diagnosis Date    Anxiety     Depressed     GERD (gastroesophageal reflux disease)     Hepatitis C         Plan:        T8-9 MRSA discitis/osteomyelitis  Polysubstance abuse  History of triscupid valve endocarditis  Hepatitis C  Multifactorial anemia  GERD  Anxiety  Depression    Continue current treatment. Monitor counts. Increase activity. Labs Thursday. Continue pain control efforts - will continue to wean pain medication. Wound care per wound care team. Maintain patient safety. Aggressive therapies as tolerated. Continue IV antibiotics under direction of ID. Discharge planning.       Electronically signed by KELLY Jones on 7/16/2024 at 13:14 CDT

## 2024-07-17 PROCEDURE — 97535 SELF CARE MNGMENT TRAINING: CPT | Performed by: OCCUPATIONAL THERAPIST

## 2024-07-17 PROCEDURE — 25010000002 DAPTOMYCIN PER 1 MG: Performed by: INTERNAL MEDICINE

## 2024-07-17 PROCEDURE — 97116 GAIT TRAINING THERAPY: CPT

## 2024-07-17 PROCEDURE — 97530 THERAPEUTIC ACTIVITIES: CPT | Performed by: OCCUPATIONAL THERAPIST

## 2024-07-17 PROCEDURE — 25010000002 ENOXAPARIN PER 10 MG: Performed by: INTERNAL MEDICINE

## 2024-07-17 PROCEDURE — 97530 THERAPEUTIC ACTIVITIES: CPT

## 2024-07-17 RX ORDER — OXYCODONE HYDROCHLORIDE 5 MG/1
20 TABLET ORAL EVERY 6 HOURS PRN
Status: DISCONTINUED | OUTPATIENT
Start: 2024-07-17 | End: 2024-07-20 | Stop reason: HOSPADM

## 2024-07-17 NOTE — PROGRESS NOTES
RYAN Tate APRN        Internal Medicine Progress Note    7/17/2024   11:02 CDT    Name:  Jaqui Kate  MRN:    [id number]     Acct:     [id number]   Room:  [facility]/1   Day: 0     Admit Date: 6/25/2024  6:41 PM  PCP: Vanesa Guevara APRN    Subjective:     C/C: pain    Interval History: Status:  stayed the same. Up to side of bed.  Mother at bedside. Afebrile. Pain well controlled at present time. Maintaining adequate 02 sats on room air. Progressing with therapies. Anxious for discharge plan.    Review of Systems   Constitutional: Positive for malaise/fatigue. Negative for chills, decreased appetite, weight gain and weight loss.   HENT:  Negative for congestion, ear discharge, hoarse voice and tinnitus.    Eyes:  Negative for blurred vision, discharge, visual disturbance and visual halos.   Cardiovascular:  Negative for chest pain, claudication, dyspnea on exertion, irregular heartbeat, leg swelling, orthopnea and paroxysmal nocturnal dyspnea.   Respiratory:  Negative for cough, shortness of breath, sputum production and wheezing.    Endocrine: Negative for cold intolerance, heat intolerance and polyuria.   Hematologic/Lymphatic: Negative for adenopathy. Does not bruise/bleed easily.   Skin:  Negative for dry skin, itching and suspicious lesions.   Musculoskeletal:  Positive for joint pain, muscle weakness and myalgias. Negative for arthritis, back pain and falls.   Gastrointestinal:  Negative for abdominal pain, constipation, diarrhea, dysphagia and hematemesis.   Genitourinary:  Negative for bladder incontinence, dysuria and frequency.   Neurological:  Positive for focal weakness and weakness. Negative for aphonia, disturbances in coordination and dizziness.   Psychiatric/Behavioral:  Positive for substance abuse. Negative for altered mental status and depression. The patient is nervous/anxious. The patient does not have insomnia.          Medications:      Allergies: No Known Allergies    Current Meds:   Current Facility-Administered Medications:     acetaminophen (TYLENOL) tablet 650 mg, 650 mg, Oral, Q4H PRN **OR** acetaminophen (TYLENOL) suppository 650 mg, 650 mg, Rectal, Q4H PRN, Augustus Fish MD    bisacodyl (DULCOLAX) suppository 10 mg, 10 mg, Rectal, Daily PRN, Augustus Fish MD    cyclobenzaprine (FLEXERIL) tablet 10 mg, 10 mg, Oral, Q8H PRN, Qing Jimenez APRN    DAPTOmycin (CUBICIN) 600 mg in sodium chloride 0.9 % 50 mL IVPB, 8 mg/kg, Intravenous, Q24H, Augustus Fish MD    diphenhydrAMINE (BENADRYL) capsule 25 mg, 25 mg, Oral, Q6H PRN, Augustus Fish MD    Enoxaparin Sodium (LOVENOX) syringe 40 mg, 40 mg, Subcutaneous, Q24H, Augustus Fish MD    gabapentin (NEURONTIN) capsule 300 mg, 300 mg, Oral, TID, Augustus Fish MD    guaiFENesin (MUCINEX) 12 hr tablet 1,200 mg, 1,200 mg, Oral, Q12H PRN, Augustus Fish MD    lactobacillus acidophilus (RISAQUAD) capsule 2 capsule, 2 capsule, Oral, Daily, Augustus Fish MD    Lidocaine 4 % 1 patch, 1 patch, Transdermal, Q24H, Augustus Fish MD    magnesium hydroxide (MILK OF MAGNESIA) 400 MG/5ML suspension 30 mL, 30 mL, Oral, BID PRN, Augustus Fish MD    melatonin tablet 5 mg, 5 mg, Oral, Nightly, Augustus Fish MD    naproxen (NAPROSYN) tablet 250 mg, 250 mg, Oral, Q8H, Qing Jimenez APRN    OLANZapine (zyPREXA) tablet 5 mg, 5 mg, Oral, Q6H PRN, Augustus Fish MD    ondansetron ODT (ZOFRAN-ODT) disintegrating tablet 4 mg, 4 mg, Oral, Q6H PRN **OR** ondansetron (ZOFRAN) injection 4 mg, 4 mg, Intravenous, Q6H PRN, Augustus Fish MD    oxyCODONE (ROXICODONE) immediate release tablet 20 mg, 20 mg, Oral, Q4H PRN, Augustus Fish MD    pantoprazole (PROTONIX) EC tablet 40 mg, 40 mg, Oral, Daily, Augustus Fish MD    promethazine (PHENERGAN) tablet 12.5 mg, 12.5 mg, Oral, Q6H PRN,  "Augustus Fish MD    rifAMPin (RIFADIN) capsule 300 mg, 300 mg, Oral, Q12H, Augustus Fish MD    sennosides-docusate (PERICOLACE) 8.6-50 MG per tablet 1 tablet, 1 tablet, Oral, BID, Augustus Fish MD    sodium chloride 0.9 % bolus 250 mL, 250 mL, Intravenous, PRN, Augustus Fish MD    sodium chloride 0.9 % flush 10 mL, 10 mL, Intravenous, Q12H, Augustus Fish MD    sodium chloride 0.9 % flush 10 mL, 10 mL, Intravenous, PRN, Augustus Fish MD    tamsulosin (FLOMAX) 24 hr capsule 0.4 mg, 0.4 mg, Oral, Nightly, Augustus Fish MD    traZODone (DESYREL) tablet 100 mg, 100 mg, Oral, Nightly, Augustus Fish MD    Data:     Code Status:    There are no questions and answers to display.       Family History   Problem Relation Age of Onset    Colon cancer Neg Hx     Colon polyps Neg Hx        Social History     Socioeconomic History    Marital status: Single   Tobacco Use    Smoking status: Every Day     Current packs/day: 0.25     Types: Cigarettes    Smokeless tobacco: Never   Vaping Use    Vaping status: Some Days    Substances: Nicotine, Flavoring    Devices: Disposable, Pre-filled pod   Substance and Sexual Activity    Alcohol use: No    Drug use: Yes     Types: Methamphetamines    Sexual activity: Yes       Vitals:  Ht 160 cm (63\")   Wt 81.2 kg (179 lb)   BMI 31.71 kg/m²   T 98.2 P 98 R 16 BP 97/59 Sp02 100% (room air)          I/O (24Hr):  No intake or output data in the 24 hours ending 07/17/24 1102    Labs and imaging:      No results found for this or any previous visit (from the past 12 hour(s)).                                    Physical Examination:        Physical Exam  Vitals and nursing note reviewed.   Constitutional:       Appearance: Normal appearance.   HENT:      Head: Normocephalic and atraumatic.      Right Ear: External ear normal.      Left Ear: External ear normal.      Nose: Nose normal.      Mouth/Throat:      Mouth: Mucous " membranes are dry.      Pharynx: Oropharynx is clear.   Eyes:      Extraocular Movements: Extraocular movements intact.      Conjunctiva/sclera: Conjunctivae normal.      Pupils: Pupils are equal, round, and reactive to light.   Cardiovascular:      Rate and Rhythm: Normal rate and regular rhythm.      Pulses: Normal pulses.      Heart sounds: Normal heart sounds.   Pulmonary:      Effort: Pulmonary effort is normal.      Breath sounds: Normal breath sounds.   Abdominal:      General: Bowel sounds are normal.      Palpations: Abdomen is soft.   Musculoskeletal:      Cervical back: Normal range of motion and neck supple.      Comments: Generalized weakness  BLE weakness   Skin:     General: Skin is warm and dry.   Neurological:      Mental Status: She is alert and oriented to person, place, and time.   Psychiatric:         Mood and Affect: Mood normal.         Behavior: Behavior normal.           Assessment:          * No active hospital problems. *    Past Medical History:   Diagnosis Date    Anxiety     Depressed     GERD (gastroesophageal reflux disease)     Hepatitis C         Plan:        T8-9 MRSA discitis/osteomyelitis  Polysubstance abuse  History of triscupid valve endocarditis  Hepatitis C  Multifactorial anemia  GERD  Anxiety  Depression    Continue current treatment. Monitor counts. Increase activity. Labs in am. Continue pain control efforts - will continue to wean pain medication. Wound care per wound care team. Maintain patient safety. Aggressive therapies as tolerated. Continue IV antibiotics under direction of ID. Discharge planning.       Electronically signed by KELLY Jones on 7/17/2024 at 11:02 CDT     I have discussed the care of Jaqui Kate, including pertinent history and exam findings, with the nurse practitioner.    I have seen and examined the patient and the key elements of all parts of the encounter have been performed by me.  I agree with the assessment, plan and orders as  documented by KELLY Philip, after I modified the exam findings and the plan of treatments and the final version is my approved version of the assessment.        Electronically signed by Augustus Fish MD on 7/17/2024 at 11:14 CDT

## 2024-07-18 LAB
ANION GAP SERPL CALCULATED.3IONS-SCNC: 9 MMOL/L (ref 5–15)
BASOPHILS # BLD AUTO: 0.03 10*3/MM3 (ref 0–0.2)
BASOPHILS NFR BLD AUTO: 0.6 % (ref 0–1.5)
BUN SERPL-MCNC: 22 MG/DL (ref 6–20)
BUN/CREAT SERPL: 35.5 (ref 7–25)
CALCIUM SPEC-SCNC: 9.3 MG/DL (ref 8.6–10.5)
CHLORIDE SERPL-SCNC: 105 MMOL/L (ref 98–107)
CO2 SERPL-SCNC: 24 MMOL/L (ref 22–29)
CREAT SERPL-MCNC: 0.62 MG/DL (ref 0.57–1)
DEPRECATED RDW RBC AUTO: 51.1 FL (ref 37–54)
EGFRCR SERPLBLD CKD-EPI 2021: 120 ML/MIN/1.73
EOSINOPHIL # BLD AUTO: 0.39 10*3/MM3 (ref 0–0.4)
EOSINOPHIL NFR BLD AUTO: 7.4 % (ref 0.3–6.2)
ERYTHROCYTE [DISTWIDTH] IN BLOOD BY AUTOMATED COUNT: 16.4 % (ref 12.3–15.4)
GLUCOSE SERPL-MCNC: 110 MG/DL (ref 65–99)
HCT VFR BLD AUTO: 34.7 % (ref 34–46.6)
HGB BLD-MCNC: 10.9 G/DL (ref 12–15.9)
IMM GRANULOCYTES # BLD AUTO: 0.02 10*3/MM3 (ref 0–0.05)
IMM GRANULOCYTES NFR BLD AUTO: 0.4 % (ref 0–0.5)
LYMPHOCYTES # BLD AUTO: 1.6 10*3/MM3 (ref 0.7–3.1)
LYMPHOCYTES NFR BLD AUTO: 30.5 % (ref 19.6–45.3)
MCH RBC QN AUTO: 26.6 PG (ref 26.6–33)
MCHC RBC AUTO-ENTMCNC: 31.4 G/DL (ref 31.5–35.7)
MCV RBC AUTO: 84.6 FL (ref 79–97)
MONOCYTES # BLD AUTO: 0.62 10*3/MM3 (ref 0.1–0.9)
MONOCYTES NFR BLD AUTO: 11.8 % (ref 5–12)
NEUTROPHILS NFR BLD AUTO: 2.59 10*3/MM3 (ref 1.7–7)
NEUTROPHILS NFR BLD AUTO: 49.3 % (ref 42.7–76)
NRBC BLD AUTO-RTO: 0 /100 WBC (ref 0–0.2)
PLATELET # BLD AUTO: 337 10*3/MM3 (ref 140–450)
PMV BLD AUTO: 9.1 FL (ref 6–12)
POTASSIUM SERPL-SCNC: 4.1 MMOL/L (ref 3.5–5.2)
RBC # BLD AUTO: 4.1 10*6/MM3 (ref 3.77–5.28)
SODIUM SERPL-SCNC: 138 MMOL/L (ref 136–145)
WBC NRBC COR # BLD AUTO: 5.25 10*3/MM3 (ref 3.4–10.8)

## 2024-07-18 PROCEDURE — 97116 GAIT TRAINING THERAPY: CPT

## 2024-07-18 PROCEDURE — 25010000002 ENOXAPARIN PER 10 MG: Performed by: INTERNAL MEDICINE

## 2024-07-18 PROCEDURE — 25010000002 ONDANSETRON PER 1 MG: Performed by: INTERNAL MEDICINE

## 2024-07-18 PROCEDURE — 80048 BASIC METABOLIC PNL TOTAL CA: CPT | Performed by: INTERNAL MEDICINE

## 2024-07-18 PROCEDURE — 25010000002 DAPTOMYCIN PER 1 MG: Performed by: INTERNAL MEDICINE

## 2024-07-18 PROCEDURE — 85025 COMPLETE CBC W/AUTO DIFF WBC: CPT | Performed by: INTERNAL MEDICINE

## 2024-07-18 RX ORDER — CYCLOBENZAPRINE HCL 10 MG
5 TABLET ORAL EVERY 8 HOURS PRN
Status: DISCONTINUED | OUTPATIENT
Start: 2024-07-18 | End: 2024-07-20 | Stop reason: HOSPADM

## 2024-07-18 NOTE — PROGRESS NOTES
Inpatient Nutrition Services  Patient Name:  Jaqui Kate  YOB: 1990  MRN: [id number]  Admit Date:  6/25/2024  Assessment Date:  7/18/2024     Reason for Assessment       Row Name 07/18/24 1201          Reason for Assessment    Reason For Assessment follow-up protocol     Diagnosis infection/sepsis                    Nutrition/Diet History       Row Name 07/18/24 1201          Nutrition/Diet History    Typical Intake (Food/Fluid/EN/PN) Adequate PO. Denies N/V/D, constipation. Receiving daily menu selections. Family bringing in foods from O[facility] PR[facility]. Weight 179lb; gain of almost 15lb x 1 week. Admit weight 160lb.                    Labs/Tests/Procedures/Meds       Row Name 07/18/24 1202          Labs/Procedures/Meds    Lab Results Reviewed reviewed        Diagnostic Tests/Procedures    Diagnostic Test/Procedure Reviewed reviewed        Medications    Pertinent Medications Reviewed reviewed                    Physical Findings       Row Name 07/18/24 1202          Physical Findings    Overall Physical Appearance Room air, back incision, BM 7/17                    Estimated/Assessed Needs - Anthropometrics       Row Name 07/18/24 1202          Anthropometrics    Weight for Calculation 72.6 kg (160 lb)        Estimated/Assessed Needs    Additional Documentation Fluid Requirements (Group);KCAL/KG (Group);Protein Requirements (Group)        KCAL/KG    KCAL/KG 25 Kcal/Kg (kcal)     25 Kcal/Kg (kcal) 1814.4        Protein Requirements    Weight Used For Protein Calculations 72.6 kg (160 lb)     Est Protein Requirement Amount (gms/kg) 1.0 gm protein     Estimated Protein Requirements (gms/day) 72.58        Fluid Requirements    Fluid Requirements (mL/day) 1814     RDA Method (mL) 1814                    Nutrition Prescription Ordered       Row Name 07/18/24 1202          Nutrition Prescription PO    Current PO Diet Regular                    Evaluation of Received Nutrient/Fluid Intake       Row Name 07/18/24  1202          Nutrient/Fluid Evaluation    Number of Days Evaluated 3 days        Fluid Intake Evaluation    Oral Fluid (mL) 1280        PO Evaluation    Number of Meals 8     % PO Intake 81                       Problem/Interventions:   Problem 1       Row Name 07/18/24 1202          Nutrition Diagnoses Problem 1    Problem 1 Nutrition Appropriate for Condition at this Time                          Intervention Goal       Row Name 07/18/24 1202          Intervention Goal    General Disease management/therapy;Meet nutritional needs for age/condition     PO Tolerate PO     Weight Maintain weight                    Nutrition Intervention       Row Name 07/18/24 1203          Nutrition Intervention    RD/Tech Action Care plan reviewd;Follow Tx progress                      Education/Evaluation       Row Name 07/18/24 1203          Education    Education No discharge needs identified at this time        Monitor/Evaluation    Monitor Per protocol                     Electronically signed by:  Mar Lopez RDN, LEATHA  07/18/24 12:03 CDT

## 2024-07-18 NOTE — PROGRESS NOTES
[facility] Fall Risk Assessment Note    Name: Jaqui Kate  MRN: [id number]  CSN: [id number]  Admit Date/Time: 6/25/2024  6:41 PM.    Currently ordered medications associated with an increased risk for fall include:    Scheduled Meds:  gabapentin, 300 mg, Oral, TID  melatonin, 5 mg, Oral, Nightly  naproxen, 250 mg, Oral, Q8H  rifAMPin, 300 mg, Oral, Q12H  senna-docusate sodium, 1 tablet, Oral, BID  traZODone, 100 mg, Oral, Nightly    PRN Meds:    bisacodyl    cyclobenzaprine    diphenhydrAMINE    magnesium hydroxide    OLANZapine    ondansetron ODT **OR** ondansetron    oxyCODONE    promethazine    Victoriano Brunner, PharmD  07/18/24 13:35 CDT

## 2024-07-18 NOTE — PROGRESS NOTES
RYAN Tate APRN        Internal Medicine Progress Note    7/18/2024   13:59 CDT    Name:  Jaqui Kate  MRN:    [id number]     Acct:     [id number]   Room:  [facility]/1   Day: 0     Admit Date: 6/25/2024  6:41 PM  PCP: Vanesa Guevara APRN    Subjective:     C/C: pain    Interval History: Status:  stayed the same. Resting in bed.  Mother at bedside. Afebrile. Pain well controlled at present time. Maintaining adequate 02 sats on room air. Progressing with therapies. Asking for flexeril dose to be decreased. Counts stable. Mother requesting repeat imaging prior to discharge.     Review of Systems   Constitutional: Positive for malaise/fatigue. Negative for chills, decreased appetite, weight gain and weight loss.   HENT:  Negative for congestion, ear discharge, hoarse voice and tinnitus.    Eyes:  Negative for blurred vision, discharge, visual disturbance and visual halos.   Cardiovascular:  Negative for chest pain, claudication, dyspnea on exertion, irregular heartbeat, leg swelling, orthopnea and paroxysmal nocturnal dyspnea.   Respiratory:  Negative for cough, shortness of breath, sputum production and wheezing.    Endocrine: Negative for cold intolerance, heat intolerance and polyuria.   Hematologic/Lymphatic: Negative for adenopathy. Does not bruise/bleed easily.   Skin:  Negative for dry skin, itching and suspicious lesions.   Musculoskeletal:  Positive for joint pain, muscle weakness and myalgias. Negative for arthritis, back pain and falls.   Gastrointestinal:  Negative for abdominal pain, constipation, diarrhea, dysphagia and hematemesis.   Genitourinary:  Negative for bladder incontinence, dysuria and frequency.   Neurological:  Positive for focal weakness and weakness. Negative for aphonia, disturbances in coordination and dizziness.   Psychiatric/Behavioral:  Positive for substance abuse. Negative for altered mental status and depression. The patient is  nervous/anxious. The patient does not have insomnia.          Medications:     Allergies: No Known Allergies    Current Meds:   Current Facility-Administered Medications:     acetaminophen (TYLENOL) tablet 650 mg, 650 mg, Oral, Q4H PRN **OR** acetaminophen (TYLENOL) suppository 650 mg, 650 mg, Rectal, Q4H PRN, Augustus Fish MD    bisacodyl (DULCOLAX) suppository 10 mg, 10 mg, Rectal, Daily PRN, Augustus Fish MD    cyclobenzaprine (FLEXERIL) tablet 10 mg, 10 mg, Oral, Q8H PRN, Qing Jimenez APRN    DAPTOmycin (CUBICIN) 600 mg in sodium chloride 0.9 % 50 mL IVPB, 8 mg/kg, Intravenous, Q24H, Augustus Fish MD    diphenhydrAMINE (BENADRYL) capsule 25 mg, 25 mg, Oral, Q6H PRN, Augustus Fish MD    Enoxaparin Sodium (LOVENOX) syringe 40 mg, 40 mg, Subcutaneous, Q24H, Augustus Fish MD    gabapentin (NEURONTIN) capsule 300 mg, 300 mg, Oral, TID, Augustus Fish MD    guaiFENesin (MUCINEX) 12 hr tablet 1,200 mg, 1,200 mg, Oral, Q12H PRN, Augustus Fish MD    lactobacillus acidophilus (RISAQUAD) capsule 2 capsule, 2 capsule, Oral, Daily, Augustus Fish MD    Lidocaine 4 % 1 patch, 1 patch, Transdermal, Q24H, Augustus Fish MD    magnesium hydroxide (MILK OF MAGNESIA) 400 MG/5ML suspension 30 mL, 30 mL, Oral, BID PRN, Augustus Fish MD    melatonin tablet 5 mg, 5 mg, Oral, Nightly, Augustus Fish MD    naproxen (NAPROSYN) tablet 250 mg, 250 mg, Oral, Q8H, Qing Jimenez APRN    OLANZapine (zyPREXA) tablet 5 mg, 5 mg, Oral, Q6H PRN, Augustus Fish MD    ondansetron ODT (ZOFRAN-ODT) disintegrating tablet 4 mg, 4 mg, Oral, Q6H PRN **OR** ondansetron (ZOFRAN) injection 4 mg, 4 mg, Intravenous, Q6H PRN, Augustus Fish MD    oxyCODONE (ROXICODONE) immediate release tablet 20 mg, 20 mg, Oral, Q6H PRN, Augustus Fish MD    pantoprazole (PROTONIX) EC tablet 40 mg, 40 mg, Oral, Daily, Augustus Fish,  "MD    promethazine (PHENERGAN) tablet 12.5 mg, 12.5 mg, Oral, Q6H PRN, Augustus Fish MD    rifAMPin (RIFADIN) capsule 300 mg, 300 mg, Oral, Q12H, Augustus Fish MD    sennosides-docusate (PERICOLACE) 8.6-50 MG per tablet 1 tablet, 1 tablet, Oral, BID, Augustus Fish MD    sodium chloride 0.9 % bolus 250 mL, 250 mL, Intravenous, PRN, Augustus Fish MD    sodium chloride 0.9 % flush 10 mL, 10 mL, Intravenous, Q12H, Augustus Fish MD    sodium chloride 0.9 % flush 10 mL, 10 mL, Intravenous, PRN, Augustus Fish MD    traZODone (DESYREL) tablet 100 mg, 100 mg, Oral, Nightly, Augustus Fish MD    Data:     Code Status:    There are no questions and answers to display.       Family History   Problem Relation Age of Onset    Colon cancer Neg Hx     Colon polyps Neg Hx        Social History     Socioeconomic History    Marital status: Single   Tobacco Use    Smoking status: Every Day     Current packs/day: 0.25     Types: Cigarettes    Smokeless tobacco: Never   Vaping Use    Vaping status: Some Days    Substances: Nicotine, Flavoring    Devices: Disposable, Pre-filled pod   Substance and Sexual Activity    Alcohol use: No    Drug use: Yes     Types: Methamphetamines    Sexual activity: Yes       Vitals:  Ht 160 cm (63\")   Wt 81.2 kg (179 lb)   BMI 31.71 kg/m²   T 97.3 P 99 R 20 BP 94/60 Sp02 100% (room air)          I/O (24Hr):  No intake or output data in the 24 hours ending 07/18/24 1359    Labs and imaging:      Recent Results (from the past 12 hour(s))   Basic Metabolic Panel    Collection Time: 07/18/24  3:29 AM    Specimen: Blood   Result Value Ref Range    Glucose 110 (H) 65 - 99 mg/dL    BUN 22 (H) 6 - 20 mg/dL    Creatinine 0.62 0.57 - 1.00 mg/dL    Sodium 138 136 - 145 mmol/L    Potassium 4.1 3.5 - 5.2 mmol/L    Chloride 105 98 - 107 mmol/L    CO2 24.0 22.0 - 29.0 mmol/L    Calcium 9.3 8.6 - 10.5 mg/dL    BUN/Creatinine Ratio 35.5 (H) 7.0 - 25.0    Anion " Gap 9.0 5.0 - 15.0 mmol/L    eGFR 120.0 >60.0 mL/min/1.73   CBC Auto Differential    Collection Time: 07/18/24  3:29 AM    Specimen: Blood   Result Value Ref Range    WBC 5.25 3.40 - 10.80 10*3/mm3    RBC 4.10 3.77 - 5.28 10*6/mm3    Hemoglobin 10.9 (L) 12.0 - 15.9 g/dL    Hematocrit 34.7 34.0 - 46.6 %    MCV 84.6 79.0 - 97.0 fL    MCH 26.6 26.6 - 33.0 pg    MCHC 31.4 (L) 31.5 - 35.7 g/dL    RDW 16.4 (H) 12.3 - 15.4 %    RDW-SD 51.1 37.0 - 54.0 fl    MPV 9.1 6.0 - 12.0 fL    Platelets 337 140 - 450 10*3/mm3    Neutrophil % 49.3 42.7 - 76.0 %    Lymphocyte % 30.5 19.6 - 45.3 %    Monocyte % 11.8 5.0 - 12.0 %    Eosinophil % 7.4 (H) 0.3 - 6.2 %    Basophil % 0.6 0.0 - 1.5 %    Immature Grans % 0.4 0.0 - 0.5 %    Neutrophils, Absolute 2.59 1.70 - 7.00 10*3/mm3    Lymphocytes, Absolute 1.60 0.70 - 3.10 10*3/mm3    Monocytes, Absolute 0.62 0.10 - 0.90 10*3/mm3    Eosinophils, Absolute 0.39 0.00 - 0.40 10*3/mm3    Basophils, Absolute 0.03 0.00 - 0.20 10*3/mm3    Immature Grans, Absolute 0.02 0.00 - 0.05 10*3/mm3    nRBC 0.0 0.0 - 0.2 /100 WBC                                       Physical Examination:        Physical Exam  Vitals and nursing note reviewed.   Constitutional:       Appearance: Normal appearance.   HENT:      Head: Normocephalic and atraumatic.      Right Ear: External ear normal.      Left Ear: External ear normal.      Nose: Nose normal.      Mouth/Throat:      Mouth: Mucous membranes are dry.      Pharynx: Oropharynx is clear.   Eyes:      Extraocular Movements: Extraocular movements intact.      Conjunctiva/sclera: Conjunctivae normal.      Pupils: Pupils are equal, round, and reactive to light.   Cardiovascular:      Rate and Rhythm: Normal rate and regular rhythm.      Pulses: Normal pulses.      Heart sounds: Normal heart sounds.   Pulmonary:      Effort: Pulmonary effort is normal.      Breath sounds: Normal breath sounds.   Abdominal:      General: Bowel sounds are normal.      Palpations: Abdomen  is soft.   Musculoskeletal:      Cervical back: Normal range of motion and neck supple.      Comments: Generalized weakness  BLE weakness   Skin:     General: Skin is warm and dry.   Neurological:      Mental Status: She is alert and oriented to person, place, and time.   Psychiatric:         Mood and Affect: Mood normal.         Behavior: Behavior normal.           Assessment:          * No active hospital problems. *    Past Medical History:   Diagnosis Date    Anxiety     Depressed     GERD (gastroesophageal reflux disease)     Hepatitis C         Plan:        T8-9 MRSA discitis/osteomyelitis  Polysubstance abuse  History of triscupid valve endocarditis  Hepatitis C  Multifactorial anemia  GERD  Anxiety  Depression    Continue current treatment. Monitor counts. Increase activity.  Continue pain control efforts - will continue to wean pain medication. Wound care per wound care team. Maintain patient safety. Aggressive therapies as tolerated. Continue IV antibiotics under direction of ID. T spine films in am. Discharge planning.   Patient has medical conditions that require her to need to use a wheelchair. A wheelchair is needed to ambulate without the home as ambulation cannot be safely performed with a cane or walker. The patient can use arms to mobilize the wheelchair and has caregiver support at home. Ordering wheelchair for home use.     Electronically signed by KELLY Jones on 7/18/2024 at 13:59 CDT

## 2024-07-19 ENCOUNTER — APPOINTMENT (OUTPATIENT)
Dept: GENERAL RADIOLOGY | Facility: HOSPITAL | Age: 34
End: 2024-07-19
Payer: COMMERCIAL

## 2024-07-19 PROCEDURE — 99232 SBSQ HOSP IP/OBS MODERATE 35: CPT | Performed by: INTERNAL MEDICINE

## 2024-07-19 PROCEDURE — 97530 THERAPEUTIC ACTIVITIES: CPT | Performed by: OCCUPATIONAL THERAPIST

## 2024-07-19 PROCEDURE — 25010000002 DAPTOMYCIN PER 1 MG: Performed by: INTERNAL MEDICINE

## 2024-07-19 PROCEDURE — 72070 X-RAY EXAM THORAC SPINE 2VWS: CPT

## 2024-07-19 PROCEDURE — 97530 THERAPEUTIC ACTIVITIES: CPT

## 2024-07-19 NOTE — PROGRESS NOTES
RYAN Tate APRN        Internal Medicine Progress Note    7/19/2024   14:57 CDT    Name:  Jaqui Kate  MRN:    [id number]     Acct:     [id number]   Room:  [facility] Day: 0     Admit Date: 6/25/2024  6:41 PM  PCP: Vanesa Guevara APRN    Subjective:     C/C: pain    Interval History: Status:  stayed the same. Resting in bed.  Sleeping. Mother asleep at bedside. Afebrile. Pain well controlled at present time. Maintaining adequate 02 sats on room air. Progressing with therapies. Plans for discharge to home in am.     Review of Systems   Constitutional: Positive for malaise/fatigue. Negative for chills, decreased appetite, weight gain and weight loss.   HENT:  Negative for congestion, ear discharge, hoarse voice and tinnitus.    Eyes:  Negative for blurred vision, discharge, visual disturbance and visual halos.   Cardiovascular:  Negative for chest pain, claudication, dyspnea on exertion, irregular heartbeat, leg swelling, orthopnea and paroxysmal nocturnal dyspnea.   Respiratory:  Negative for cough, shortness of breath, sputum production and wheezing.    Endocrine: Negative for cold intolerance, heat intolerance and polyuria.   Hematologic/Lymphatic: Negative for adenopathy. Does not bruise/bleed easily.   Skin:  Negative for dry skin, itching and suspicious lesions.   Musculoskeletal:  Positive for joint pain, muscle weakness and myalgias. Negative for arthritis, back pain and falls.   Gastrointestinal:  Negative for abdominal pain, constipation, diarrhea, dysphagia and hematemesis.   Genitourinary:  Negative for bladder incontinence, dysuria and frequency.   Neurological:  Positive for focal weakness and weakness. Negative for aphonia, disturbances in coordination and dizziness.   Psychiatric/Behavioral:  Positive for substance abuse. Negative for altered mental status and depression. The patient is nervous/anxious. The patient does not have insomnia.           Medications:     Allergies: No Known Allergies    Current Meds:   Current Facility-Administered Medications:     acetaminophen (TYLENOL) tablet 650 mg, 650 mg, Oral, Q4H PRN **OR** acetaminophen (TYLENOL) suppository 650 mg, 650 mg, Rectal, Q4H PRN, Augustus Fish MD    bisacodyl (DULCOLAX) suppository 10 mg, 10 mg, Rectal, Daily PRN, Augustus Fish MD    cyclobenzaprine (FLEXERIL) tablet 5 mg, 5 mg, Oral, Q8H PRN, Qing Jimenez APRN    DAPTOmycin (CUBICIN) 600 mg in sodium chloride 0.9 % 50 mL IVPB, 8 mg/kg, Intravenous, Q24H, Augustus Fish MD    diphenhydrAMINE (BENADRYL) capsule 25 mg, 25 mg, Oral, Q6H PRN, Augustus Fish MD    Enoxaparin Sodium (LOVENOX) syringe 40 mg, 40 mg, Subcutaneous, Q24H, Augustus Fish MD    gabapentin (NEURONTIN) capsule 300 mg, 300 mg, Oral, TID, Augustus Fish MD    guaiFENesin (MUCINEX) 12 hr tablet 1,200 mg, 1,200 mg, Oral, Q12H PRN, Augustus Fish MD    lactobacillus acidophilus (RISAQUAD) capsule 2 capsule, 2 capsule, Oral, Daily, Augustus Fish MD    Lidocaine 4 % 1 patch, 1 patch, Transdermal, Q24H, Augustus Fish MD    magnesium hydroxide (MILK OF MAGNESIA) 400 MG/5ML suspension 30 mL, 30 mL, Oral, BID PRN, Augustus Fish MD    melatonin tablet 5 mg, 5 mg, Oral, Nightly, Augustus Fish MD    naproxen (NAPROSYN) tablet 250 mg, 250 mg, Oral, Q8H, Qing Jimenez APRN    OLANZapine (zyPREXA) tablet 5 mg, 5 mg, Oral, Q6H PRN, Augustus Fish MD    ondansetron ODT (ZOFRAN-ODT) disintegrating tablet 4 mg, 4 mg, Oral, Q6H PRN **OR** ondansetron (ZOFRAN) injection 4 mg, 4 mg, Intravenous, Q6H PRN, Augustus Fish MD    oxyCODONE (ROXICODONE) immediate release tablet 20 mg, 20 mg, Oral, Q6H PRN, Augustus Fish MD    pantoprazole (PROTONIX) EC tablet 40 mg, 40 mg, Oral, Daily, Augustus Fish MD    promethazine (PHENERGAN) tablet 12.5 mg, 12.5 mg,  "Oral, Q6H PRN, Augustus Fish MD    rifAMPin (RIFADIN) capsule 300 mg, 300 mg, Oral, Q12H, Augustus Fish MD    sennosides-docusate (PERICOLACE) 8.6-50 MG per tablet 1 tablet, 1 tablet, Oral, BID, Augustus Fish MD    sodium chloride 0.9 % bolus 250 mL, 250 mL, Intravenous, PRN, Augustus Fish MD    sodium chloride 0.9 % flush 10 mL, 10 mL, Intravenous, Q12H, Augustus Fish MD    sodium chloride 0.9 % flush 10 mL, 10 mL, Intravenous, PRN, Augustus Fish MD    traZODone (DESYREL) tablet 100 mg, 100 mg, Oral, Nightly, Augustus Fish MD    Data:     Code Status:    There are no questions and answers to display.       Family History   Problem Relation Age of Onset    Colon cancer Neg Hx     Colon polyps Neg Hx        Social History     Socioeconomic History    Marital status: Single   Tobacco Use    Smoking status: Every Day     Current packs/day: 0.25     Types: Cigarettes    Smokeless tobacco: Never   Vaping Use    Vaping status: Some Days    Substances: Nicotine, Flavoring    Devices: Disposable, Pre-filled pod   Substance and Sexual Activity    Alcohol use: No    Drug use: Yes     Types: Methamphetamines    Sexual activity: Yes       Vitals:  Ht 160 cm (63\")   Wt 81.2 kg (179 lb)   BMI 31.71 kg/m²   T 98.2 P 95 R 16 BP 98/45 Sp02 92% (room air)          I/O (24Hr):  No intake or output data in the 24 hours ending 07/19/24 1457    Labs and imaging:      No results found for this or any previous visit (from the past 12 hour(s)).                                      Physical Examination:        Physical Exam  Vitals and nursing note reviewed.   Constitutional:       Appearance: Normal appearance.   HENT:      Head: Normocephalic and atraumatic.      Right Ear: External ear normal.      Left Ear: External ear normal.      Nose: Nose normal.      Mouth/Throat:      Mouth: Mucous membranes are dry.      Pharynx: Oropharynx is clear.   Eyes:      Extraocular " Movements: Extraocular movements intact.      Conjunctiva/sclera: Conjunctivae normal.      Pupils: Pupils are equal, round, and reactive to light.   Cardiovascular:      Rate and Rhythm: Normal rate and regular rhythm.      Pulses: Normal pulses.      Heart sounds: Normal heart sounds.   Pulmonary:      Effort: Pulmonary effort is normal.      Breath sounds: Normal breath sounds.   Abdominal:      General: Bowel sounds are normal.      Palpations: Abdomen is soft.   Musculoskeletal:      Cervical back: Normal range of motion and neck supple.      Comments: Generalized weakness  BLE weakness   Skin:     General: Skin is warm and dry.   Neurological:      Mental Status: She is alert and oriented to person, place, and time.   Psychiatric:         Mood and Affect: Mood normal.         Behavior: Behavior normal.           Assessment:          * No active hospital problems. *    Past Medical History:   Diagnosis Date    Anxiety     Depressed     GERD (gastroesophageal reflux disease)     Hepatitis C         Plan:        T8-9 MRSA discitis/osteomyelitis  Polysubstance abuse  History of triscupid valve endocarditis  Hepatitis C  Multifactorial anemia  GERD  Anxiety  Depression    Continue current treatment. Monitor counts. Increase activity.  Continue pain control efforts - will continue to wean pain medication. Wound care per wound care team. Maintain patient safety. Aggressive therapies as tolerated. Continue IV antibiotics under direction of ID.. Discharge planning.       Electronically signed by KELLY Jones on 7/19/2024 at 14:57 CDT

## 2024-07-19 NOTE — PROGRESS NOTES
"INFECTIOUS DISEASES PROGRESS NOTE    Patient:  Jaqui Kate  YOB: 1990  MRN: [id number]   Admit date: 6/25/2024   Admitting Physician: Augustus Fish MD  Primary Care Physician: Vanesa Guevara APRN    Chief Complaint: MRSA infection    Interval History: Patient said she continues to do well.  Anxious for discharge home tomorrow after her last dose of daptomycin.    Discussed oral options she has tolerated Bactrim previously.  She states she will take it around 4 AM and 4 PM as that schedule works for her.    I mentioned that BARAK Mccrary discharge planner had appointment set up for her in [place].  The patient's mother said she was not aware.    Allergies: No Known Allergies    Current Scheduled Medications:   DAPTOmycin, 8 mg/kg, Intravenous, Q24H  enoxaparin, 40 mg, Subcutaneous, Q24H  gabapentin, 300 mg, Oral, TID  lactobacillus acidophilus, 2 capsule, Oral, Daily  Lidocaine, 1 patch, Transdermal, Q24H  melatonin, 5 mg, Oral, Nightly  naproxen, 250 mg, Oral, Q8H  pantoprazole, 40 mg, Oral, Daily  rifAMPin, 300 mg, Oral, Q12H  senna-docusate sodium, 1 tablet, Oral, BID  sodium chloride, 10 mL, Intravenous, Q12H  traZODone, 100 mg, Oral, Nightly      Current PRN Medications:    acetaminophen **OR** acetaminophen    bisacodyl    cyclobenzaprine    diphenhydrAMINE    guaiFENesin    magnesium hydroxide    OLANZapine    ondansetron ODT **OR** ondansetron    oxyCODONE    promethazine    sodium chloride    sodium chloride            Objective     Vital Signs:  No data recorded.      Ht 160 cm (63\")   Wt 81.2 kg (179 lb)   BMI 31.71 kg/m²     Temperature 98.2     heart rate 95   respiratory rate 16      blood pressure 98/45  Physical Exam:  General: The patient is nontoxic-appearing sitting in the bed in no acute distress  Respiratory: Effort even and unlabored, she is not conversationally dyspneic  Neuro: Alert and oriented, speech clear.  She was not ambulated    Lab Results:    CBC:   Lab " -post septoplasty 6 days ago.  -ENT evaluated the patient.  Follow further recommendations.  -Bleeding controlled after 2 interventions in the ER.  - Repeat bleeding on morning of 3/1 requiring bilateral nasal packing by ENT. These will need to stay in place over the weekend and plan to monitor patient. Started on augmentin for staph aureus coverage. Discussed with Dr. Quiroz.  -Monitor hemoglobin.     Results   Lab 07/15/24  0540 07/18/24  0329   WBC 5.04 5.25   HEMOGLOBIN 10.1* 10.9*   HEMATOCRIT 32.7* 34.7   PLATELETS 289 337        AutoDiff:   Lab Results   Lab 07/18/24  0329   NEUTROPHIL % 49.3   LYMPHOCYTE % 30.5   MONOCYTES % 11.8   EOSINOPHIL % 7.4*   BASOPHIL % 0.6   NEUTROS ABS 2.59   LYMPHS ABS 1.60   MONOS ABS 0.62   EOS ABS 0.39   BASOS ABS 0.03          Manual Diff:    Lab Results   Lab 07/18/24  0329   NEUTROS ABS 2.59             CMP:   Lab Results   Lab 07/15/24  0540 07/18/24  0329   SODIUM 138 138   POTASSIUM 4.4 4.1   CHLORIDE 107 105   CO2 25.0 24.0   BUN 19 22*   CREATININE 0.55* 0.62   CALCIUM 8.9 9.3   GLUCOSE 94 110*       Estimated Creatinine Clearance: 129 mL/min (by C-G formula based on SCr of 0.62 mg/dL).      C-Reactive Protein   Date Value Ref Range Status   07/15/2024 <0.30 0.00 - 0.50 mg/dL Final   07/08/2024 <0.30 0.00 - 0.50 mg/dL Final   07/03/2024 <0.30 0.00 - 0.50 mg/dL Final     Sed Rate   Date Value Ref Range Status   07/15/2024 39 (H) 0 - 20 mm/hr Final   07/08/2024 28 (H) 0 - 20 mm/hr Final   07/04/2024 36 (H) 0 - 20 mm/hr Final       Culture Results:    Microbiology Results (last 10 days)       ** No results found for the last 240 hours. **               CP[facility]          7/15/2024    05:40   Common Labs   Creatine Kinase 23           Radiology:   Imaging Results (Last 72 Hours)       ** No results found for the last 72 hours. **                There are no active hospital problems to display for this patient.      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  She is status post emergent surgical drainage Luna 10 at [facility] in [place].    Recurrent MRSA bacteremia-patient had bacteremia in January 2024 (hospitalized at [facility] again in March 2024 and then again when presented back to [facility] in June, 2024.  Per [facility] records, blood  cultures reported to have cleared Luna 10 .  History of tricuspid valve endocarditis with septic pulmonary emboli secondary to MRSA-diagnosed when hospitalized at [facility]  History of untreated hepatitis C       RECOMMENDATION:   Continue daptomycin-through tomorrow, July 20.  On June 21 start Bactrim double strength 1 p.o. every 12 hours-indefinitely  Continue oral rifampin-through tomorrow, July 20   Per records from [place], patient's MRSA was susceptible to Bactrim, clindamycin and tetracycline.   Reviewed options with patient and she has taken Bactrim before, tolerated well.  Discussed with her risk of sun sensitivity (less of a risk than doxycycline)  Upon discharge will need referral to GI for hepatitis C treatment-discussed with patient.  This has not taken place from what I can tell, however, the patient lives in [place], is close to [facility] and her mother said there is a GI doctor there that they can see for hepatitis C treatment.    Mesfin Mccrary RN discussed charge planner patient has follow-up with Dr. Gricelda Borja infectious diseases in [place].    Debby Gastelum MD  07/19/24  14:45 CDT

## 2024-07-20 PROCEDURE — 25010000002 DAPTOMYCIN PER 1 MG: Performed by: INTERNAL MEDICINE

## 2024-07-20 PROCEDURE — 97530 THERAPEUTIC ACTIVITIES: CPT

## 2024-07-20 NOTE — DISCHARGE SUMMARY
RYAN Tate APRN      Internal Medicine Discharge Summary    Patient ID: Jaqui Kate  MRN: [id number]     Acct:  [id number]       Patient's PCP: Vanesa Guevara APRN    Admit Date: 6/25/2024     Discharge Date:   7/20/2024    Admitting Physician: Augustus Fish MD    Discharge Physician: KELLY Urena     Active Discharge Diagnoses:  T8-9 MRSA discitis/osteomyelitis  Polysubstance abuse  History of triscupid valve endocarditis  Hepatitis C  Multifactorial anemia  GERD  Anxiety  Depression    Primary Problem  <principal problem not specified>      Hospital Problems    * No active hospital problems. *     Past Medical History:   Diagnosis Date    Anxiety     Depressed     GERD (gastroesophageal reflux disease)     Hepatitis C        The patient was seen and examined on the day of discharge and this discharge summary is in conjunction with any daily progress note from day of discharge.    Code Status:    There are no questions and answers to display.       Hospital Course:   Jaqui Kate is a  33 y.o.  female who presented with need for continued IV antibiotic therapy following a recent acute care stay. The patient, with a history of polysubstance abuse, including IV drug use, and MRS bacteremia with tricuspid valve endocarditis and hepatitis C, had been in her usual state of health when she developed increased weakness with worsening bilateral lower extremity weakness. When her symptoms persisted over 3 days to the point that she could not ambulate or urinate, she presented to an outlying ER with her complaints. Workup revealed evidence of t8-9 discitis with vertebral body osteomyelitis with severe anterior wedge compression deformity of both vertebral bodies. There was not spinal stenosis or evidence of cord compression, but there was a large anterior and bilateral epidural abscess extending to the mediastinum, including the pericardium and  encasement of the descending aorta. She was transferred to [facility] at that time for neurosurgery evaluation and higher level of care. Cultures were obtained and she was treated with IV antibiotics. UDS positive for methamphetamine and opioids. She was seen in consultation by ID for antibiotic recommendations and management. She presented to OR on 6/10 where she underwent T6-11 posterolateral fusion with T8 posterior osteotomy and lateral extracavity corpectomy and T9 vertebral body resection with pedicle screw placement. Patient suffered no immediate intra-operative or post-operative complications. Surgical cultures returned positive for MRSA. She had issues with post-operative pain control. She transferred to our faciltiy for continued IV antibiotic therapy and rehabilitation efforts. During her stay she was able to progress well with therapy. ID followed her and she completed her course of IV antibiotics without difficulty. She will be transitioned to PO antibiotic upon discharge. She is being discharged home today in stable condition with her family.       Physical Exam  Vitals and nursing note reviewed.   Constitutional:       Appearance: Normal appearance.   HENT:      Head: Normocephalic and atraumatic.      Right Ear: External ear normal.      Left Ear: External ear normal.      Nose: Nose normal.      Mouth/Throat:      Mouth: Mucous membranes are dry.      Pharynx: Oropharynx is clear.   Eyes:      Extraocular Movements: Extraocular movements intact.      Conjunctiva/sclera: Conjunctivae normal.      Pupils: Pupils are equal, round, and reactive to light.   Cardiovascular:      Rate and Rhythm: Normal rate and regular rhythm.      Pulses: Normal pulses.      Heart sounds: Normal heart sounds.   Pulmonary:      Effort: Pulmonary effort is normal.      Breath sounds: Normal breath sounds.   Abdominal:      General: Bowel sounds are normal.      Palpations: Abdomen is soft.   Musculoskeletal:       Cervical back: Normal range of motion and neck supple.      Comments: Generalized weakness  BLE weakness   Skin:     General: Skin is warm and dry.   Neurological:      Mental Status: She is alert and oriented to person, place, and time.   Psychiatric:         Mood and Affect: Mood normal.         Behavior: Behavior normal.     Review of Systems  Constitutional: Positive for malaise/fatigue. Negative for chills, decreased appetite, weight gain and weight loss.   HENT:  Negative for congestion, ear discharge, hoarse voice and tinnitus.    Eyes:  Negative for blurred vision, discharge, visual disturbance and visual halos.   Cardiovascular:  Negative for chest pain, claudication, dyspnea on exertion, irregular heartbeat, leg swelling, orthopnea and paroxysmal nocturnal dyspnea.   Respiratory:  Negative for cough, shortness of breath, sputum production and wheezing.    Endocrine: Negative for cold intolerance, heat intolerance and polyuria.   Hematologic/Lymphatic: Negative for adenopathy. Does not bruise/bleed easily.   Skin:  Negative for dry skin, itching and suspicious lesions.   Musculoskeletal:  Positive for joint pain, muscle weakness and myalgias. Negative for arthritis, back pain and falls.   Gastrointestinal:  Negative for abdominal pain, constipation, diarrhea, dysphagia and hematemesis.   Genitourinary:  Negative for bladder incontinence, dysuria and frequency.   Neurological:  Positive for focal weakness and weakness. Negative for aphonia, disturbances in coordination and dizziness.   Psychiatric/Behavioral:  Positive for substance abuse. Negative for altered mental status and depression. The patient is nervous/anxious. The patient does not have insomnia.    T 98.3 HR 80 RR 28 /78 SPO2 96% RA   Consults:      Disposition: home    Discharged Condition: Stable    Follow Up: No follow-up provider specified.    Diet: Diet: Regular/House; Fluid Consistency: Thin (IDDSI 0)    Discharge Medications:       Discharge Medications      Patient Not Prescribed Medications Upon Discharge         Time Spent on discharge is  32 minutes in patient examination, evaluation, patient/family counseling as well as medication reconciliation, prescriptions for required medications, discharge plan and follow up.     Electronically signed by KELLY Urena on 7/20/2024 at 08:11 CDT     I have discussed the care of Jaqui Kate, including pertinent history and exam findings, with the nurse practitioner.    I have seen and examined the patient and the key elements of all parts of the encounter have been performed by me.  I agree with the assessment, plan and orders as documented by KELLY Caba, after I modified the exam findings and the plan of treatments and the final version is my approved version of the assessment.        Electronically signed by Augustus Fish MD on 7/21/2024 at 08:28 CDT

## 2024-10-31 ENCOUNTER — HOSPITAL ENCOUNTER (OUTPATIENT)
Dept: GENERAL RADIOLOGY | Facility: HOSPITAL | Age: 34
Discharge: HOME | End: 2024-10-31
Admitting: PAIN MEDICINE
Payer: COMMERCIAL

## 2024-10-31 ENCOUNTER — TRANSCRIBE ORDERS (OUTPATIENT)
Dept: ADMINISTRATIVE | Facility: HOSPITAL | Age: 34
End: 2024-10-31
Payer: COMMERCIAL

## 2024-10-31 DIAGNOSIS — M47.816 LUMBAR SPONDYLOSIS: ICD-10-CM

## 2024-10-31 DIAGNOSIS — M47.816 LUMBAR SPONDYLOSIS: Primary | ICD-10-CM

## 2024-10-31 PROCEDURE — 72110 X-RAY EXAM L-2 SPINE 4/>VWS: CPT

## 2024-12-23 ENCOUNTER — TRANSCRIBE ORDERS (OUTPATIENT)
Dept: ADMINISTRATIVE | Facility: HOSPITAL | Age: 34
End: 2024-12-23
Payer: COMMERCIAL

## 2024-12-23 DIAGNOSIS — M47.816 LUMBAR SPONDYLOSIS: Primary | ICD-10-CM
